# Patient Record
Sex: MALE | Race: ASIAN | Employment: OTHER | URBAN - METROPOLITAN AREA
[De-identification: names, ages, dates, MRNs, and addresses within clinical notes are randomized per-mention and may not be internally consistent; named-entity substitution may affect disease eponyms.]

---

## 2017-01-23 PROBLEM — G89.29 CHRONIC PAIN OF BOTH KNEES: Status: ACTIVE | Noted: 2017-01-23

## 2017-01-23 PROBLEM — M25.562 CHRONIC PAIN OF BOTH KNEES: Status: ACTIVE | Noted: 2017-01-23

## 2017-01-23 PROBLEM — M25.561 CHRONIC PAIN OF BOTH KNEES: Status: ACTIVE | Noted: 2017-01-23

## 2017-10-08 ENCOUNTER — HOSPITAL ENCOUNTER (EMERGENCY)
Facility: HOSPITAL | Age: 82
Discharge: HOME | End: 2017-10-08
Attending: EMERGENCY MEDICINE
Payer: COMMERCIAL

## 2017-10-08 ENCOUNTER — APPOINTMENT (OUTPATIENT)
Dept: GENERAL RADIOLOGY | Facility: HOSPITAL | Age: 82
End: 2017-10-08
Attending: EMERGENCY MEDICINE
Payer: COMMERCIAL

## 2017-10-08 VITALS
TEMPERATURE: 98 F | RESPIRATION RATE: 20 BRPM | WEIGHT: 156.94 LBS | SYSTOLIC BLOOD PRESSURE: 152 MMHG | BODY MASS INDEX: 27 KG/M2 | DIASTOLIC BLOOD PRESSURE: 98 MMHG | OXYGEN SATURATION: 98 % | HEART RATE: 68 BPM

## 2017-10-08 DIAGNOSIS — R09.89 FEELING OF FOREIGN BODY IN THROAT: Primary | ICD-10-CM

## 2017-10-08 PROCEDURE — 99283 EMERGENCY DEPT VISIT LOW MDM: CPT

## 2017-10-08 PROCEDURE — 70360 X-RAY EXAM OF NECK: CPT | Performed by: EMERGENCY MEDICINE

## 2017-10-09 NOTE — ED PROVIDER NOTES
Patient Seen in: [facility] Emergency Department    History   Patient presents with:  FB in Throat (GI, respiratory)    Stated Complaint: PILL STUCK IN THROAT    HPI    Patient is a pleasant 25-year-old male, presenting for evaluation after feeling src: Temporal  SpO2: 98 %  O2 Device: None (Room air)    Current:/98   Pulse 68   Temp 98.2 °F (36.8 °C) (Temporal)   Resp 20   Wt 71.2 kg   SpO2 98%   BMI 26.94 kg/m²     Physical Exam    Vital signs are reviewed noted as documented in the nursing c Patient was placed on the cart and evaluated. Soft tissue of the neck was obtained. Patient was given crackers and water. Patient was reevaluated. Results of the x-rays were reviewed. Patient states he feels subjectively improved.   Patient jane

## 2017-10-09 NOTE — ED INITIAL ASSESSMENT (HPI)
States he took half of tab of glucosamine an hr ago and feels like it became stuck in his throat. Able to swallow water. No resp distress.

## 2018-04-07 PROCEDURE — 82570 ASSAY OF URINE CREATININE: CPT | Performed by: INTERNAL MEDICINE

## 2018-04-07 PROCEDURE — 82043 UR ALBUMIN QUANTITATIVE: CPT | Performed by: INTERNAL MEDICINE

## 2018-06-04 PROBLEM — R01.1 CARDIAC MURMUR: Status: ACTIVE | Noted: 2018-06-04

## 2020-10-05 ENCOUNTER — HOSPITAL ENCOUNTER (EMERGENCY)
Facility: HOSPITAL | Age: 85
Discharge: HOME | End: 2020-10-05
Attending: EMERGENCY MEDICINE
Payer: COMMERCIAL

## 2020-10-05 ENCOUNTER — APPOINTMENT (OUTPATIENT)
Dept: GENERAL RADIOLOGY | Facility: HOSPITAL | Age: 85
End: 2020-10-05
Attending: EMERGENCY MEDICINE
Payer: COMMERCIAL

## 2020-10-05 VITALS
HEART RATE: 69 BPM | DIASTOLIC BLOOD PRESSURE: 81 MMHG | HEIGHT: 70 IN | WEIGHT: 159.81 LBS | RESPIRATION RATE: 21 BRPM | BODY MASS INDEX: 22.88 KG/M2 | TEMPERATURE: 98 F | SYSTOLIC BLOOD PRESSURE: 192 MMHG | OXYGEN SATURATION: 98 %

## 2020-10-05 DIAGNOSIS — H91.20 SUDDEN-ONSET SENSORINEURAL HEARING LOSS: Primary | ICD-10-CM

## 2020-10-05 DIAGNOSIS — R06.00 DYSPNEA ON EXERTION: ICD-10-CM

## 2020-10-05 PROCEDURE — 85379 FIBRIN DEGRADATION QUANT: CPT | Performed by: EMERGENCY MEDICINE

## 2020-10-05 PROCEDURE — 85025 COMPLETE CBC W/AUTO DIFF WBC: CPT | Performed by: EMERGENCY MEDICINE

## 2020-10-05 PROCEDURE — 99285 EMERGENCY DEPT VISIT HI MDM: CPT

## 2020-10-05 PROCEDURE — 80053 COMPREHEN METABOLIC PANEL: CPT | Performed by: EMERGENCY MEDICINE

## 2020-10-05 PROCEDURE — 84484 ASSAY OF TROPONIN QUANT: CPT | Performed by: EMERGENCY MEDICINE

## 2020-10-05 PROCEDURE — 99284 EMERGENCY DEPT VISIT MOD MDM: CPT

## 2020-10-05 PROCEDURE — 83880 ASSAY OF NATRIURETIC PEPTIDE: CPT | Performed by: EMERGENCY MEDICINE

## 2020-10-05 PROCEDURE — 36415 COLL VENOUS BLD VENIPUNCTURE: CPT

## 2020-10-05 PROCEDURE — 93005 ELECTROCARDIOGRAM TRACING: CPT

## 2020-10-05 PROCEDURE — 94640 AIRWAY INHALATION TREATMENT: CPT

## 2020-10-05 PROCEDURE — 71045 X-RAY EXAM CHEST 1 VIEW: CPT | Performed by: EMERGENCY MEDICINE

## 2020-10-05 PROCEDURE — 93010 ELECTROCARDIOGRAM REPORT: CPT

## 2020-10-05 RX ORDER — ALBUTEROL SULFATE 90 UG/1
8 AEROSOL, METERED RESPIRATORY (INHALATION) 4 TIMES DAILY
Status: DISCONTINUED | OUTPATIENT
Start: 2020-10-05 | End: 2020-10-05

## 2020-10-05 RX ORDER — PREDNISONE 20 MG/1
TABLET ORAL
Qty: 13 TABLET | Refills: 0 | Status: SHIPPED | OUTPATIENT
Start: 2020-10-05 | End: 2020-10-14

## 2020-10-05 RX ORDER — PREDNISONE 20 MG/1
40 TABLET ORAL ONCE
Status: COMPLETED | OUTPATIENT
Start: 2020-10-05 | End: 2020-10-05

## 2020-10-06 NOTE — ED INITIAL ASSESSMENT (HPI)
Per patients family patient had sudden hearing loss at 0900 this morning and has not improved throughout the day, however there is some positional dizziness. Patient also C/O feeling short of breath and some intermittent wheezes.   Patient is A&Ox4

## 2020-10-06 NOTE — ED PROVIDER NOTES
Patient Seen in: [facility] Emergency Department      History   Patient presents with:  Ear Problem Pain    Stated Complaint: reports hearing loss from right ear since noon.  denies injury or pain    HPI    80year-old with history of hypertension, h nonspecific finding. History reviewed. No pertinent surgical history.                  Social History    Tobacco Use      Smoking status: Never Smoker      Smokeless tobacco: Never Used    Alcohol use: No      Alcohol/week: 0.0 standard drinks following components:    Pro-Beta Natriuretic Peptide 902 (*)     All other components within normal limits   CBC W/ DIFFERENTIAL - Abnormal; Notable for the following components:    Monocyte Absolute 1.01 (*)     All other components within normal limits and his rapid is negative. proBNP just mildly elevated. He does have aortic stenosis and I brought up the possibility of this causing his symptoms are at least contributing. I offered admission for further evaluation. Family and patient declined.   They

## 2020-10-06 NOTE — RESPIRATORY THERAPY NOTE
Albuterol and atrovent inhaler given. Post BS clear/diminished. Pt states his breathing feels the same. No SOB at this time.

## 2021-03-31 ENCOUNTER — APPOINTMENT (OUTPATIENT)
Dept: GENERAL RADIOLOGY | Facility: HOSPITAL | Age: 86
End: 2021-03-31
Attending: EMERGENCY MEDICINE
Payer: COMMERCIAL

## 2021-03-31 ENCOUNTER — APPOINTMENT (OUTPATIENT)
Dept: CT IMAGING | Facility: HOSPITAL | Age: 86
End: 2021-03-31
Attending: EMERGENCY MEDICINE
Payer: COMMERCIAL

## 2021-03-31 ENCOUNTER — HOSPITAL ENCOUNTER (EMERGENCY)
Facility: HOSPITAL | Age: 86
Discharge: HOME | End: 2021-03-31
Attending: EMERGENCY MEDICINE
Payer: COMMERCIAL

## 2021-03-31 ENCOUNTER — APPOINTMENT (OUTPATIENT)
Dept: MRI IMAGING | Facility: HOSPITAL | Age: 86
End: 2021-03-31
Attending: EMERGENCY MEDICINE
Payer: COMMERCIAL

## 2021-03-31 VITALS
OXYGEN SATURATION: 97 % | SYSTOLIC BLOOD PRESSURE: 166 MMHG | BODY MASS INDEX: 23.95 KG/M2 | RESPIRATION RATE: 21 BRPM | TEMPERATURE: 98 F | HEART RATE: 88 BPM | WEIGHT: 149 LBS | DIASTOLIC BLOOD PRESSURE: 84 MMHG | HEIGHT: 66 IN

## 2021-03-31 DIAGNOSIS — R06.2 WHEEZING: Primary | ICD-10-CM

## 2021-03-31 DIAGNOSIS — R91.8 PULMONARY INFILTRATES ON CXR: ICD-10-CM

## 2021-03-31 DIAGNOSIS — I10 ESSENTIAL HYPERTENSION: ICD-10-CM

## 2021-03-31 DIAGNOSIS — R13.10 DIFFICULTY SWALLOWING SOLIDS: ICD-10-CM

## 2021-03-31 DIAGNOSIS — J90 PLEURAL EFFUSION: ICD-10-CM

## 2021-03-31 PROCEDURE — 70551 MRI BRAIN STEM W/O DYE: CPT | Performed by: EMERGENCY MEDICINE

## 2021-03-31 PROCEDURE — 96376 TX/PRO/DX INJ SAME DRUG ADON: CPT

## 2021-03-31 PROCEDURE — 70491 CT SOFT TISSUE NECK W/DYE: CPT | Performed by: EMERGENCY MEDICINE

## 2021-03-31 PROCEDURE — 99284 EMERGENCY DEPT VISIT MOD MDM: CPT

## 2021-03-31 PROCEDURE — 85025 COMPLETE CBC W/AUTO DIFF WBC: CPT | Performed by: EMERGENCY MEDICINE

## 2021-03-31 PROCEDURE — 96361 HYDRATE IV INFUSION ADD-ON: CPT

## 2021-03-31 PROCEDURE — 94640 AIRWAY INHALATION TREATMENT: CPT

## 2021-03-31 PROCEDURE — 99285 EMERGENCY DEPT VISIT HI MDM: CPT

## 2021-03-31 PROCEDURE — 96375 TX/PRO/DX INJ NEW DRUG ADDON: CPT

## 2021-03-31 PROCEDURE — 80053 COMPREHEN METABOLIC PANEL: CPT | Performed by: EMERGENCY MEDICINE

## 2021-03-31 PROCEDURE — 96374 THER/PROPH/DIAG INJ IV PUSH: CPT

## 2021-03-31 PROCEDURE — 71045 X-RAY EXAM CHEST 1 VIEW: CPT | Performed by: EMERGENCY MEDICINE

## 2021-03-31 RX ORDER — LORAZEPAM 2 MG/ML
1 INJECTION INTRAMUSCULAR ONCE
Status: COMPLETED | OUTPATIENT
Start: 2021-03-31 | End: 2021-03-31

## 2021-03-31 RX ORDER — ALBUTEROL SULFATE 90 UG/1
4 AEROSOL, METERED RESPIRATORY (INHALATION) ONCE
Status: COMPLETED | OUTPATIENT
Start: 2021-03-31 | End: 2021-03-31

## 2021-03-31 RX ORDER — IPRATROPIUM BROMIDE AND ALBUTEROL SULFATE 2.5; .5 MG/3ML; MG/3ML
3 SOLUTION RESPIRATORY (INHALATION) ONCE
Status: DISCONTINUED | OUTPATIENT
Start: 2021-03-31 | End: 2021-03-31

## 2021-03-31 RX ORDER — METHYLPREDNISOLONE 4 MG/1
TABLET ORAL
Qty: 1 PACKAGE | Refills: 0 | Status: SHIPPED | OUTPATIENT
Start: 2021-03-31 | End: 2021-05-13 | Stop reason: ALTCHOICE

## 2021-03-31 RX ORDER — ALBUTEROL SULFATE 2.5 MG/3ML
2.5 SOLUTION RESPIRATORY (INHALATION) EVERY 4 HOURS PRN
Qty: 30 AMPULE | Refills: 0 | Status: SHIPPED | OUTPATIENT
Start: 2021-03-31 | End: 2021-06-24

## 2021-03-31 RX ORDER — ALBUTEROL SULFATE 90 UG/1
2 AEROSOL, METERED RESPIRATORY (INHALATION) 4 TIMES DAILY
Status: DISCONTINUED | OUTPATIENT
Start: 2021-03-31 | End: 2021-03-31

## 2021-03-31 RX ORDER — FUROSEMIDE 10 MG/ML
40 INJECTION INTRAMUSCULAR; INTRAVENOUS ONCE
Status: COMPLETED | OUTPATIENT
Start: 2021-03-31 | End: 2021-03-31

## 2021-03-31 RX ORDER — METHYLPREDNISOLONE SODIUM SUCCINATE 125 MG/2ML
125 INJECTION, POWDER, LYOPHILIZED, FOR SOLUTION INTRAMUSCULAR; INTRAVENOUS ONCE
Status: COMPLETED | OUTPATIENT
Start: 2021-03-31 | End: 2021-03-31

## 2021-03-31 RX ORDER — AMOXICILLIN AND CLAVULANATE POTASSIUM 600; 42.9 MG/5ML; MG/5ML
875 POWDER, FOR SUSPENSION ORAL 2 TIMES DAILY
Qty: 100 ML | Refills: 0 | Status: SHIPPED | OUTPATIENT
Start: 2021-03-31 | End: 2021-04-10

## 2021-03-31 RX ORDER — HYDRALAZINE HYDROCHLORIDE 20 MG/ML
5 INJECTION INTRAMUSCULAR; INTRAVENOUS ONCE
Status: COMPLETED | OUTPATIENT
Start: 2021-03-31 | End: 2021-03-31

## 2021-03-31 NOTE — ED INITIAL ASSESSMENT (HPI)
Pt here stating he had a difficult time eating whole foods at lunch time feeling a fullness in his throat.  Pt was able to take his pills this morning with toast. Pt currently has audible wheezing denies sob per daughter in law pt has had audible wheezing f

## 2021-03-31 NOTE — ED PROVIDER NOTES
Patient Seen in: [facility] Emergency Department      History   Patient presents with:  Swallowing Problem    Stated Complaint: diff swallowing    HPI/Subjective:   HPI    Delightful 80year-old gentleman presents to the emergency department with hi on lung CT 5/1/2015    A 3.5 mm nodular opacity in the caudal and posterior lateral corner of the posterior segment of the right upper lobe (image 113 of series 3) represents a nonspecific finding. History reviewed.  No pertinent surgical histo present. Comments: While patient is wheezing in all lung fields he does not appear to even be significantly distressed by events. Oxygen saturations are 99 to 100%. Chest:      Chest wall: No tenderness.    Abdominal:      General: Bowel sounds are n -----------         ------                     CBC W/ DIFFERENTIAL[[id number]]          Abnormal            Final result                 Please view results for these tests on the individual orders.    RAINBOW DRAW LAVENDER   RAINBOW DRAW LIGHT GREEN   RAINB VASCULATURE:  Limited views are unremarkable. BONES:  No significant osseous lesions. Degenerative change involves the     cervical spine.       OTHER:  Survey images through the upper lungs demonstrate bilateral     pleural effusions, right greater t small vessel ischemic change within the deep white matter.               Dictated by (CST): Vera Holliday MD on 3/31/2021 at 10:03 PM         Finalized by (CST): Vera Holliday MD on 3/31/2021 at 10:06 PM        XR CHEST AP PORTABLE  (CPT=71045)   Final Result whitecoat hypertension therefore we tried some lorazepam as well particularly as we are doing some work-up.   Given that the patient has had the sudden onset of difficulty swallowing I do worry about lacunar infarcts particular with the recent history of th well to albuterol. He was given some steroids for the swelling and a Medrol Dosepak I think would be very reasonable.   Pleural effusions were seen on the CT soft tissue neck is unclear if they are simply too small to be seen on the chest x-ray that was do 45 Brook Johnson MD  [address]          Alice Nguyen MD  [id number] [address] [id number] [phone]    Schedule an appointment as soon as jerodl

## 2021-04-01 ENCOUNTER — HOSPITAL ENCOUNTER (OUTPATIENT)
Dept: GENERAL RADIOLOGY | Facility: HOSPITAL | Age: 86
Discharge: HOME | End: 2021-04-01
Attending: EMERGENCY MEDICINE
Payer: COMMERCIAL

## 2021-04-01 DIAGNOSIS — R13.10 DIFFICULTY SWALLOWING SOLIDS: ICD-10-CM

## 2021-04-01 PROCEDURE — 74230 X-RAY XM SWLNG FUNCJ C+: CPT | Performed by: EMERGENCY MEDICINE

## 2021-04-01 PROCEDURE — 92611 MOTION FLUOROSCOPY/SWALLOW: CPT

## 2021-04-01 NOTE — CM/SW NOTE
Scheduled video swallow XR at 1030 today. Called patient and his daughter in law, Shea Hamilton at [phone] to update that patient is scheduled for the test today. Patient should come in from the [employer]. Patient and daughter in law in agreement.  Centr

## 2021-04-01 NOTE — CM/SW NOTE
Patient will need follow up in terms of scheduling speech eval and video swallow. Patient's daughter in law Blanca Batch [phone] is at his bedside, she is a physician.    Explained that I can schedule evaluations, but may need assistance from PCP, Dr. Duke Marin

## 2021-04-01 NOTE — ED QUICK NOTES
Dr Yolie Garcia at bedside talking to pt and family at length about the POC.  Pt voided a total of 800cc yellow urine via urinal

## 2021-04-01 NOTE — SLP NOTE
ADULT VIDEOFLUOROSCOPIC SWALLOWING STUDY    Admission Date: 4/1/2021  Evaluation Date: 04/01/21  Radiologist: Dr. Dow Huddle: Regular (choose softer items)  Diet Recommendations - Liquid:  Thin    Further Upright;Midline;Standard Chair. Patient Viewed: Lateral.   .  Consistencies Presented: Thin liquids;Puree;Hard solid to assess oropharyngeal swallow function and assess for compensatory strategies to improve safe swallow function.     THIN LIQUIDS  Method Impairments of the oral phase resulted in increased oral transit times and oral residue which clears with liquid wash and multiple swallows.          PLAN:   No further skilled intervention is warranted as patient with a mild oral phase impairment and fun

## 2022-02-12 ENCOUNTER — LAB ENCOUNTER (OUTPATIENT)
Dept: LAB | Age: 87
End: 2022-02-12
Attending: INTERNAL MEDICINE
Payer: COMMERCIAL

## 2022-02-12 DIAGNOSIS — Z01.818 PRE-PROCEDURAL EXAMINATION: ICD-10-CM

## 2022-02-12 DIAGNOSIS — Z11.59 ENCOUNTER FOR SCREENING FOR OTHER VIRAL DISEASES: ICD-10-CM

## 2022-02-13 LAB — SARS-COV-2 RNA RESP QL NAA+PROBE: NOT DETECTED

## 2022-02-14 ENCOUNTER — LAB ENCOUNTER (OUTPATIENT)
Dept: LAB | Facility: HOSPITAL | Age: 87
End: 2022-02-14
Attending: INTERNAL MEDICINE
Payer: COMMERCIAL

## 2022-02-14 ENCOUNTER — HOSPITAL ENCOUNTER (OUTPATIENT)
Dept: ULTRASOUND IMAGING | Facility: HOSPITAL | Age: 87
Discharge: HOME | End: 2022-02-14
Attending: INTERNAL MEDICINE
Payer: COMMERCIAL

## 2022-02-14 ENCOUNTER — HOSPITAL ENCOUNTER (OUTPATIENT)
Dept: GENERAL RADIOLOGY | Facility: HOSPITAL | Age: 87
Discharge: HOME | End: 2022-02-14
Attending: INTERNAL MEDICINE
Payer: COMMERCIAL

## 2022-02-14 DIAGNOSIS — J90 RECURRENT RIGHT PLEURAL EFFUSION: ICD-10-CM

## 2022-02-14 DIAGNOSIS — R06.02 SOB (SHORTNESS OF BREATH): ICD-10-CM

## 2022-02-14 DIAGNOSIS — J90 PLEURAL EFFUSION ON RIGHT: ICD-10-CM

## 2022-02-14 DIAGNOSIS — E87.5 HYPERKALEMIA: ICD-10-CM

## 2022-02-14 LAB
ANION GAP SERPL CALC-SCNC: 2 MMOL/L (ref 0–18)
BASOPHILS NFR PLR: 0 %
BUN BLD-MCNC: 26 MG/DL (ref 7–18)
CALCIUM BLD-MCNC: 9 MG/DL (ref 8.5–10.1)
CHLORIDE SERPL-SCNC: 106 MMOL/L (ref 98–112)
CO2 SERPL-SCNC: 31 MMOL/L (ref 21–32)
COLOR FLD: YELLOW
CREAT BLD-MCNC: 1.65 MG/DL
EOSINOPHIL NFR PLR: 3 %
FASTING STATUS PATIENT QL REPORTED: NO
GLUCOSE BLD-MCNC: 136 MG/DL (ref 70–99)
GLUCOSE PLR-MCNC: 179 MG/DL
LDH FLD L TO P-CCNC: 96 U/L
LYMPHOCYTES NFR PLR: 43 %
MESOTHL CELL NFR PLR: 26 %
MONOS+MACROS NFR PLR: 20 %
NEUTROPHILS NFR PLR: 8 %
OSMOLALITY SERPL CALC.SUM OF ELEC: 295 MOSM/KG (ref 275–295)
PH PLR: 7.5 [PH] (ref 7.2–7.5)
POTASSIUM SERPL-SCNC: 4.4 MMOL/L (ref 3.5–5.1)
PROT PLR-MCNC: 3.3 G/DL
RBC PLEURAL FLUID: <3000 /MM3
SODIUM SERPL-SCNC: 139 MMOL/L (ref 136–145)
TOTAL CELLS COUNTED FLD: 100
TURBIDITY CSF QL: CLEAR
WBC # FLD: 114 /MM3

## 2022-02-14 PROCEDURE — 82800 BLOOD PH: CPT | Performed by: INTERNAL MEDICINE

## 2022-02-14 PROCEDURE — 80048 BASIC METABOLIC PNL TOTAL CA: CPT

## 2022-02-14 PROCEDURE — 89051 BODY FLUID CELL COUNT: CPT | Performed by: INTERNAL MEDICINE

## 2022-02-14 PROCEDURE — 32555 ASPIRATE PLEURA W/ IMAGING: CPT | Performed by: INTERNAL MEDICINE

## 2022-02-14 PROCEDURE — 88108 CYTOPATH CONCENTRATE TECH: CPT | Performed by: INTERNAL MEDICINE

## 2022-02-14 PROCEDURE — 82945 GLUCOSE OTHER FLUID: CPT | Performed by: INTERNAL MEDICINE

## 2022-02-14 PROCEDURE — 36415 COLL VENOUS BLD VENIPUNCTURE: CPT

## 2022-02-14 PROCEDURE — 87070 CULTURE OTHR SPECIMN AEROBIC: CPT | Performed by: INTERNAL MEDICINE

## 2022-02-14 PROCEDURE — 84157 ASSAY OF PROTEIN OTHER: CPT | Performed by: INTERNAL MEDICINE

## 2022-02-14 PROCEDURE — 89050 BODY FLUID CELL COUNT: CPT | Performed by: INTERNAL MEDICINE

## 2022-02-14 PROCEDURE — 88305 TISSUE EXAM BY PATHOLOGIST: CPT | Performed by: INTERNAL MEDICINE

## 2022-02-14 PROCEDURE — 83615 LACTATE (LD) (LDH) ENZYME: CPT | Performed by: INTERNAL MEDICINE

## 2022-02-14 PROCEDURE — 87205 SMEAR GRAM STAIN: CPT | Performed by: INTERNAL MEDICINE

## 2022-02-14 NOTE — PROCEDURES
Thoracentesis Procedure Note  : Dr. Georgie Pan  Indication: right thoracentesis   Location: RIGHT  Pre-procedure: Patient was identified in [address] [place] [place] [address]. Consent was obtained and documented from the patient. Procedural pause performed. Ultrasound was used to identify moderate pleural effusion with best window at the 10th intercostal space along the midscapular line. Vital signs, laboratory studies, chest imaging, medications, and allergies were reviewed. The patient was draped, and skin cleansed with chlorhexidine in the usual sterile fashion. Sterile gloves and masks were worn by operators. Procedure: 1% lidocaine was instilled first into the dermis and then adjacent to the pleural at the 10th intercostal space. A skin nick was made. A [employer] Safety Thoracentesis (8 Fr) catheter was placed in the usual sterile fashion. A total of 1.2L of clear yellow fluid was removed. The catheter was removed, and a sterile bandage was applied. The fluid was sent for pH, total protein, LDH, cultures, cytology . Post-procedure: The patient tolerated the procedure well. A post-procedural chest x-ray was ordered. ADDENDUM:  Post CXR noted with lateral hydropneumothorax, findings concerning for suspected trapped lung, less likely PTX. Recommend repeat CXR in am to ensure stability. Patient was advised to come immediately to the ER if he develops worsening SOB, chest pain, palpitations, lightheadedness/dizziness. Family at bedside, states madi Pan MD

## 2022-02-15 ENCOUNTER — HOSPITAL ENCOUNTER (OUTPATIENT)
Dept: GENERAL RADIOLOGY | Facility: HOSPITAL | Age: 87
Discharge: HOME | End: 2022-02-15
Attending: INTERNAL MEDICINE
Payer: COMMERCIAL

## 2022-02-15 DIAGNOSIS — J94.8 HYDROPNEUMOTHORAX: ICD-10-CM

## 2022-02-15 PROCEDURE — 71046 X-RAY EXAM CHEST 2 VIEWS: CPT | Performed by: INTERNAL MEDICINE

## 2022-02-17 ENCOUNTER — HOSPITAL ENCOUNTER (OUTPATIENT)
Dept: GENERAL RADIOLOGY | Facility: HOSPITAL | Age: 87
Discharge: HOME | End: 2022-02-17
Attending: INTERNAL MEDICINE
Payer: COMMERCIAL

## 2022-02-17 ENCOUNTER — HOSPITAL ENCOUNTER (OUTPATIENT)
Facility: HOSPITAL | Age: 87
Setting detail: OBSERVATION
Discharge: HOME | End: 2022-02-19
Attending: EMERGENCY MEDICINE | Admitting: INTERNAL MEDICINE
Payer: COMMERCIAL

## 2022-02-17 DIAGNOSIS — J94.8 HYDROPNEUMOTHORAX: ICD-10-CM

## 2022-02-17 DIAGNOSIS — J94.2 PNEUMOHEMOTHORAX: Primary | ICD-10-CM

## 2022-02-17 PROBLEM — J93.9 PNEUMOTHORAX: Status: ACTIVE | Noted: 2022-02-17

## 2022-02-17 LAB
ALBUMIN SERPL-MCNC: 3.2 G/DL (ref 3.4–5)
ALBUMIN/GLOB SERPL: 0.7 {RATIO} (ref 1–2)
ALP LIVER SERPL-CCNC: 119 U/L
ALT SERPL-CCNC: 24 U/L
ANION GAP SERPL CALC-SCNC: 3 MMOL/L (ref 0–18)
AST SERPL-CCNC: 17 U/L (ref 15–37)
BASOPHILS # BLD AUTO: 0.09 X10(3) UL (ref 0–0.2)
BASOPHILS NFR BLD AUTO: 1 %
BILIRUB SERPL-MCNC: 0.5 MG/DL (ref 0.1–2)
BUN BLD-MCNC: 24 MG/DL (ref 7–18)
CALCIUM BLD-MCNC: 9.4 MG/DL (ref 8.5–10.1)
CHLORIDE SERPL-SCNC: 105 MMOL/L (ref 98–112)
CO2 SERPL-SCNC: 30 MMOL/L (ref 21–32)
CREAT BLD-MCNC: 1.6 MG/DL
EOSINOPHIL # BLD AUTO: 0.56 X10(3) UL (ref 0–0.7)
EOSINOPHIL NFR BLD AUTO: 6.2 %
ERYTHROCYTE [DISTWIDTH] IN BLOOD BY AUTOMATED COUNT: 13.7 %
GLOBULIN PLAS-MCNC: 4.3 G/DL (ref 2.8–4.4)
GLUCOSE BLD-MCNC: 131 MG/DL (ref 70–99)
HCT VFR BLD AUTO: 39.2 %
HGB BLD-MCNC: 12.6 G/DL
IMM GRANULOCYTES # BLD AUTO: 0.02 X10(3) UL (ref 0–1)
IMM GRANULOCYTES NFR BLD: 0.2 %
LYMPHOCYTES # BLD AUTO: 1.51 X10(3) UL (ref 1–4)
LYMPHOCYTES NFR BLD AUTO: 16.6 %
MCH RBC QN AUTO: 27.3 PG (ref 26–34)
MCHC RBC AUTO-ENTMCNC: 32.1 G/DL (ref 31–37)
MCV RBC AUTO: 85 FL
MONOCYTES # BLD AUTO: 1.17 X10(3) UL (ref 0.1–1)
MONOCYTES NFR BLD AUTO: 12.9 %
NEUTROPHILS # BLD AUTO: 5.72 X10 (3) UL (ref 1.5–7.7)
NEUTROPHILS # BLD AUTO: 5.72 X10(3) UL (ref 1.5–7.7)
NEUTROPHILS NFR BLD AUTO: 63.1 %
OSMOLALITY SERPL CALC.SUM OF ELEC: 292 MOSM/KG (ref 275–295)
POTASSIUM SERPL-SCNC: 4.4 MMOL/L (ref 3.5–5.1)
RBC # BLD AUTO: 4.61 X10(6)UL
SARS-COV-2 RNA RESP QL NAA+PROBE: NOT DETECTED
SODIUM SERPL-SCNC: 138 MMOL/L (ref 136–145)
WBC # BLD AUTO: 9.1 X10(3) UL (ref 4–11)

## 2022-02-17 PROCEDURE — 99285 EMERGENCY DEPT VISIT HI MDM: CPT

## 2022-02-17 PROCEDURE — 93005 ELECTROCARDIOGRAM TRACING: CPT

## 2022-02-17 PROCEDURE — 85025 COMPLETE CBC W/AUTO DIFF WBC: CPT | Performed by: EMERGENCY MEDICINE

## 2022-02-17 PROCEDURE — 36415 COLL VENOUS BLD VENIPUNCTURE: CPT

## 2022-02-17 PROCEDURE — 80053 COMPREHEN METABOLIC PANEL: CPT | Performed by: EMERGENCY MEDICINE

## 2022-02-17 PROCEDURE — 71046 X-RAY EXAM CHEST 2 VIEWS: CPT | Performed by: INTERNAL MEDICINE

## 2022-02-17 PROCEDURE — 93010 ELECTROCARDIOGRAM REPORT: CPT

## 2022-02-17 RX ORDER — ACETAMINOPHEN 500 MG
500 TABLET ORAL EVERY 6 HOURS PRN
COMMUNITY

## 2022-02-17 RX ORDER — ACETAMINOPHEN 325 MG/1
650 TABLET ORAL EVERY 6 HOURS PRN
Status: DISCONTINUED | OUTPATIENT
Start: 2022-02-17 | End: 2022-02-19

## 2022-02-18 ENCOUNTER — APPOINTMENT (OUTPATIENT)
Dept: GENERAL RADIOLOGY | Facility: HOSPITAL | Age: 87
End: 2022-02-18
Attending: INTERNAL MEDICINE
Payer: COMMERCIAL

## 2022-02-18 LAB
ADENOVIRUS PCR:: NOT DETECTED
ANION GAP SERPL CALC-SCNC: 1 MMOL/L (ref 0–18)
ATRIAL RATE: 57 BPM
ATRIAL RATE: 80 BPM
B PARAPERT DNA SPEC QL NAA+PROBE: NOT DETECTED
B PERT DNA SPEC QL NAA+PROBE: NOT DETECTED
BASOPHILS # BLD AUTO: 0.07 X10(3) UL (ref 0–0.2)
BASOPHILS NFR BLD AUTO: 0.8 %
BUN BLD-MCNC: 25 MG/DL (ref 7–18)
C PNEUM DNA SPEC QL NAA+PROBE: NOT DETECTED
CALCIUM BLD-MCNC: 8.9 MG/DL (ref 8.5–10.1)
CHLORIDE SERPL-SCNC: 105 MMOL/L (ref 98–112)
CO2 SERPL-SCNC: 33 MMOL/L (ref 21–32)
CORONAVIRUS 229E PCR:: NOT DETECTED
CORONAVIRUS HKU1 PCR:: NOT DETECTED
CORONAVIRUS NL63 PCR:: NOT DETECTED
CORONAVIRUS OC43 PCR:: NOT DETECTED
CREAT BLD-MCNC: 1.54 MG/DL
EOSINOPHIL # BLD AUTO: 0.72 X10(3) UL (ref 0–0.7)
ERYTHROCYTE [DISTWIDTH] IN BLOOD BY AUTOMATED COUNT: 13.7 %
FLUAV RNA SPEC QL NAA+PROBE: NOT DETECTED
FLUBV RNA SPEC QL NAA+PROBE: NOT DETECTED
GLUCOSE BLD-MCNC: 119 MG/DL (ref 70–99)
HCT VFR BLD AUTO: 38.7 %
HGB BLD-MCNC: 11.8 G/DL
IMM GRANULOCYTES # BLD AUTO: 0.03 X10(3) UL (ref 0–1)
IMM GRANULOCYTES NFR BLD: 0.4 %
LYMPHOCYTES # BLD AUTO: 1.42 X10(3) UL (ref 1–4)
LYMPHOCYTES NFR BLD AUTO: 16.6 %
MCH RBC QN AUTO: 26.4 PG (ref 26–34)
MCHC RBC AUTO-ENTMCNC: 30.5 G/DL (ref 31–37)
MCV RBC AUTO: 86.6 FL
METAPNEUMOVIRUS PCR:: NOT DETECTED
MONOCYTES # BLD AUTO: 1.04 X10(3) UL (ref 0.1–1)
MONOCYTES NFR BLD AUTO: 12.2 %
MYCOPLASMA PNEUMONIA PCR:: NOT DETECTED
NEUTROPHILS # BLD AUTO: 5.26 X10 (3) UL (ref 1.5–7.7)
NEUTROPHILS # BLD AUTO: 5.26 X10(3) UL (ref 1.5–7.7)
NEUTROPHILS NFR BLD AUTO: 61.6 %
OSMOLALITY SERPL CALC.SUM OF ELEC: 294 MOSM/KG (ref 275–295)
PARAINFLUENZA 1 PCR:: NOT DETECTED
PARAINFLUENZA 2 PCR:: NOT DETECTED
PARAINFLUENZA 3 PCR:: NOT DETECTED
PARAINFLUENZA 4 PCR:: NOT DETECTED
PLATELET # BLD AUTO: 308 10(3)UL (ref 150–450)
POTASSIUM SERPL-SCNC: 4.8 MMOL/L (ref 3.5–5.1)
Q-T INTERVAL: 412 MS
Q-T INTERVAL: 422 MS
QRS DURATION: 78 MS
QRS DURATION: 82 MS
QTC CALCULATION (BEZET): 414 MS
QTC CALCULATION (BEZET): 414 MS
R AXIS: -41 DEGREES
R AXIS: -44 DEGREES
RBC # BLD AUTO: 4.47 X10(6)UL
RHINOVIRUS/ENTERO PCR:: NOT DETECTED
RSV RNA SPEC QL NAA+PROBE: NOT DETECTED
SARS-COV-2 RNA NPH QL NAA+NON-PROBE: NOT DETECTED
SODIUM SERPL-SCNC: 139 MMOL/L (ref 136–145)
T AXIS: 16 DEGREES
T AXIS: 27 DEGREES
VENTRICULAR RATE: 58 BPM
VENTRICULAR RATE: 61 BPM
WBC # BLD AUTO: 8.5 X10(3) UL (ref 4–11)

## 2022-02-18 PROCEDURE — 93005 ELECTROCARDIOGRAM TRACING: CPT

## 2022-02-18 PROCEDURE — 71045 X-RAY EXAM CHEST 1 VIEW: CPT | Performed by: INTERNAL MEDICINE

## 2022-02-18 PROCEDURE — 80048 BASIC METABOLIC PNL TOTAL CA: CPT | Performed by: INTERNAL MEDICINE

## 2022-02-18 PROCEDURE — 93010 ELECTROCARDIOGRAM REPORT: CPT | Performed by: INTERNAL MEDICINE

## 2022-02-18 PROCEDURE — 85025 COMPLETE CBC W/AUTO DIFF WBC: CPT | Performed by: INTERNAL MEDICINE

## 2022-02-18 PROCEDURE — 94640 AIRWAY INHALATION TREATMENT: CPT

## 2022-02-18 PROCEDURE — 0202U NFCT DS 22 TRGT SARS-COV-2: CPT | Performed by: HOSPITALIST

## 2022-02-18 RX ORDER — ATORVASTATIN CALCIUM 10 MG/1
10 TABLET, FILM COATED ORAL NIGHTLY
Status: DISCONTINUED | OUTPATIENT
Start: 2022-02-18 | End: 2022-02-19

## 2022-02-18 RX ORDER — FLUTICASONE PROPIONATE 50 MCG
2 SPRAY, SUSPENSION (ML) NASAL DAILY
Status: DISCONTINUED | OUTPATIENT
Start: 2022-02-18 | End: 2022-02-19

## 2022-02-18 RX ORDER — LOSARTAN POTASSIUM 100 MG/1
100 TABLET ORAL DAILY
Status: DISCONTINUED | OUTPATIENT
Start: 2022-02-19 | End: 2022-02-19

## 2022-02-18 RX ORDER — LEVOTHYROXINE SODIUM 0.05 MG/1
50 TABLET ORAL
Status: DISCONTINUED | OUTPATIENT
Start: 2022-02-18 | End: 2022-02-19

## 2022-02-18 RX ORDER — METOPROLOL TARTRATE 50 MG/1
50 TABLET, FILM COATED ORAL
Status: DISCONTINUED | OUTPATIENT
Start: 2022-02-18 | End: 2022-02-18

## 2022-02-18 RX ORDER — FUROSEMIDE 40 MG/1
40 TABLET ORAL
Status: DISCONTINUED | OUTPATIENT
Start: 2022-02-18 | End: 2022-02-19

## 2022-02-18 RX ORDER — AZELASTINE 1 MG/ML
1 SPRAY, METERED NASAL 2 TIMES DAILY PRN
Status: DISCONTINUED | OUTPATIENT
Start: 2022-02-18 | End: 2022-02-19

## 2022-02-18 RX ORDER — ALBUTEROL SULFATE 2.5 MG/3ML
2.5 SOLUTION RESPIRATORY (INHALATION) EVERY 4 HOURS PRN
Status: DISCONTINUED | OUTPATIENT
Start: 2022-02-18 | End: 2022-02-19

## 2022-02-18 RX ORDER — METOPROLOL TARTRATE 50 MG/1
50 TABLET, FILM COATED ORAL
Status: DISCONTINUED | OUTPATIENT
Start: 2022-02-18 | End: 2022-02-19

## 2022-02-18 NOTE — ED INITIAL ASSESSMENT (HPI)
Pt arrives via wheelchair to room, had Right sided pleurocentesis on Monday where 1.2Liters were drained. Pt had noted small pneumothorax after surgery, per pulmonology they suggested to monitor and get xrays. Today they had another xray, pulmonology recommended ED visit as the hydropneumothorax has been slightly increasing. A&O x3.

## 2022-02-18 NOTE — PROGRESS NOTES
02/17/22 2305 02/17/22 2310 02/17/22 2315   Vital Signs   BP (!) 176/51 (!) 172/51 (!) 173/56   MAP (mmHg) 81 76 84   BP Location Left arm Left arm Left arm   BP Method Automatic Automatic Automatic   Patient Position Lying Sitting Standing

## 2022-02-18 NOTE — PROGRESS NOTES
Pt is A+Ox4. VSS on RA- but using 4L of O2 for treatment of PTX at this time. R lung sounds diminished. L lung sounds clear. Pt c.o wheezing- PRN nebs ordered- to be given one PCR done. Currently in Junctional rhythm- seems to be new since patients last EKG- Cardiology consulted. Voids in bathroom. No c.o pain or discomfort at this time. Up with SBA and walker. Son at bedside to assist with translation if needed. WCTM patient at this time.

## 2022-02-18 NOTE — ED QUICK NOTES
Orders for admission, patient is aware of plan and ready to go upstairs. Any questions, please call ED RN Sarah Craig at extension [phone]    Vaccinated? Yes  Type of COVID test sent: Rapid  COVID Suspicion level: Low      Titratable drug(s) infusing:   Rate: N/A    LOC at time of transport: A&O x3    Other pertinent information: Pt and family do not want chest tube at this time and are preferring oygen therapy and monitor. Pt's daughter is intensivist and patient advocate. Pt is on 6L NC, he is 100% on RA but is on NC for the pneumo.     CIWA score=  NIH score=

## 2022-02-18 NOTE — PLAN OF CARE
NURSING ADMISSION NOTE      Patient admitted via cart from ER to Rm 2627  AOX4, very limited [place]. On O2 6L/NC sats 100%  Accompanied by daughter in law  Up in the bathroom with walker  Denies pain  Informed Pulm of admission. Maintain O2 sats 92% and above, and can decrease O2. O2 decreased to 4l/NC and order CXR in am  SBP 170s, pt didn't take his BP med, given. Labs in am.  Oriented to room. Safety precautions initiated. Bed in low position. Call light in reach.   Problem: Patient/Family Goals  Goal: Patient/Family Long Term Goal  Description: Patient's Long Term Goal:Resolved Pneumo/hydrothorax    Interventions:  - O2  -pulm consult  -CXR  -monitor O2 sats  - See additional Care Plan goals for specific interventions  Outcome: Progressing  Goal: Patient/Family Short Term Goal  Description: Patient's Short Term Goal: be comfortable, able to rest/sleep    Interventions:   - needs attended  -O2  -fall prevention  -cluster care  - See additional Care Plan goals for specific interventions  Outcome: Progressing     Problem: DISCHARGE PLANNING  Goal: Discharge to home or other facility with appropriate resources  Description: INTERVENTIONS:  - Identify barriers to discharge w/pt and caregiver  - Include patient/family/discharge partner in discharge planning  - Arrange for needed discharge resources and transportation as appropriate  - Identify discharge learning needs (meds, wound care, etc)  - Arrange for interpreters to assist at discharge as needed  - Consider post-discharge preferences of patient/family/discharge partner  - Complete POLST form as appropriate  - Assess patient's ability to be responsible for managing their own health  - Refer to Case Management Department for coordinating discharge planning if the patient needs post-hospital services based on physician/LIP order or complex needs related to functional status, cognitive ability or social support system  Outcome: Progressing     Problem: CARDIOVASCULAR - ADULT  Goal: Maintains optimal cardiac output and hemodynamic stability  Description: INTERVENTIONS:  - Monitor vital signs, rhythm, and trends  - Monitor for bleeding, hypotension and signs of decreased cardiac output  - Evaluate effectiveness of vasoactive medications to optimize hemodynamic stability  - Monitor arterial and/or venous puncture sites for bleeding and/or hematoma  - Assess quality of pulses, skin color and temperature  - Assess for signs of decreased coronary artery perfusion - ex.  Angina  - Evaluate fluid balance, assess for edema, trend weights  Outcome: Progressing  Goal: Absence of cardiac arrhythmias or at baseline  Description: INTERVENTIONS:  - Continuous cardiac monitoring, monitor vital signs, obtain 12 lead EKG if indicated  - Evaluate effectiveness of antiarrhythmic and heart rate control medications as ordered  - Initiate emergency measures for life threatening arrhythmias  - Monitor electrolytes and administer replacement therapy as ordered  Outcome: Progressing     Problem: RESPIRATORY - ADULT  Goal: Achieves optimal ventilation and oxygenation  Description: INTERVENTIONS:  - Assess for changes in respiratory status  - Assess for changes in mentation and behavior  - Position to facilitate oxygenation and minimize respiratory effort  - Oxygen supplementation based on oxygen saturation or ABGs  - Provide Smoking Cessation handout, if applicable  - Encourage broncho-pulmonary hygiene including cough, deep breathe, Incentive Spirometry  - Assess the need for suctioning and perform as needed  - Assess and instruct to report SOB or any respiratory difficulty  - Respiratory Therapy support as indicated  - Manage/alleviate anxiety  - Monitor for signs/symptoms of CO2 retention  Outcome: Progressing

## 2022-02-19 ENCOUNTER — APPOINTMENT (OUTPATIENT)
Dept: GENERAL RADIOLOGY | Facility: HOSPITAL | Age: 87
End: 2022-02-19
Attending: INTERNAL MEDICINE
Payer: COMMERCIAL

## 2022-02-19 VITALS
HEART RATE: 60 BPM | OXYGEN SATURATION: 98 % | DIASTOLIC BLOOD PRESSURE: 51 MMHG | TEMPERATURE: 98 F | RESPIRATION RATE: 16 BRPM | WEIGHT: 136.88 LBS | BODY MASS INDEX: 23 KG/M2 | SYSTOLIC BLOOD PRESSURE: 154 MMHG

## 2022-02-19 LAB
ANION GAP SERPL CALC-SCNC: 2 MMOL/L (ref 0–18)
BUN BLD-MCNC: 25 MG/DL (ref 7–18)
CALCIUM BLD-MCNC: 9 MG/DL (ref 8.5–10.1)
CHLORIDE SERPL-SCNC: 104 MMOL/L (ref 98–112)
CO2 SERPL-SCNC: 34 MMOL/L (ref 21–32)
CREAT BLD-MCNC: 1.45 MG/DL
ERYTHROCYTE [DISTWIDTH] IN BLOOD BY AUTOMATED COUNT: 13.3 %
GLUCOSE BLD-MCNC: 126 MG/DL (ref 70–99)
HCT VFR BLD AUTO: 37.8 %
HGB BLD-MCNC: 11.7 G/DL
MAGNESIUM SERPL-MCNC: 2.1 MG/DL (ref 1.6–2.6)
MCH RBC QN AUTO: 26.5 PG (ref 26–34)
MCHC RBC AUTO-ENTMCNC: 31 G/DL (ref 31–37)
MCV RBC AUTO: 85.7 FL
OSMOLALITY SERPL CALC.SUM OF ELEC: 296 MOSM/KG (ref 275–295)
PLATELET # BLD AUTO: 316 10(3)UL (ref 150–450)
POTASSIUM SERPL-SCNC: 4.4 MMOL/L (ref 3.5–5.1)
RBC # BLD AUTO: 4.41 X10(6)UL
SODIUM SERPL-SCNC: 140 MMOL/L (ref 136–145)
WBC # BLD AUTO: 8 X10(3) UL (ref 4–11)

## 2022-02-19 PROCEDURE — 71045 X-RAY EXAM CHEST 1 VIEW: CPT | Performed by: INTERNAL MEDICINE

## 2022-02-19 PROCEDURE — 85027 COMPLETE CBC AUTOMATED: CPT | Performed by: HOSPITALIST

## 2022-02-19 PROCEDURE — 80048 BASIC METABOLIC PNL TOTAL CA: CPT | Performed by: HOSPITALIST

## 2022-02-19 PROCEDURE — 83735 ASSAY OF MAGNESIUM: CPT | Performed by: HOSPITALIST

## 2022-02-19 NOTE — PLAN OF CARE
Assumed care of patient at [address]. Alert and oriented x4, No C/O chest pain or SOB, SPO2 on 4L 100%, Heart rate Junctional/SB 50s-60s. Breath sounds in R lung sound diminished, L lung sounds clear. Bed is locked and in low position. Personal belonging within reach. Daughter at bedside. Updated on plan of care and verbalized understanding. Will continue to monitor.      Problem: Patient/Family Goals  Goal: Patient/Family Long Term Goal  Description: Patient's Long Term Goal: resolution of PTX    Interventions:  - See additional Care Plan goals for specific interventions  Outcome: Progressing  Goal: Patient/Family Short Term Goal  Description: Patient's Short Term Goal: PTX improvement    Interventions:   - O2 therapy  -Pulm consult    - See additional Care Plan goals for specific interventions  Outcome: Progressing     Problem: DISCHARGE PLANNING  Goal: Discharge to home or other facility with appropriate resources  Description: INTERVENTIONS:  - Identify barriers to discharge w/pt and caregiver  - Include patient/family/discharge partner in discharge planning  - Arrange for needed discharge resources and transportation as appropriate  - Identify discharge learning needs (meds, wound care, etc)  - Arrange for interpreters to assist at discharge as needed  - Consider post-discharge preferences of patient/family/discharge partner  - Complete POLST form as appropriate  - Assess patient's ability to be responsible for managing their own health  - Refer to Case Management Department for coordinating discharge planning if the patient needs post-hospital services based on physician/LIP order or complex needs related to functional status, cognitive ability or social support system  Outcome: Progressing     Problem: CARDIOVASCULAR - ADULT  Goal: Maintains optimal cardiac output and hemodynamic stability  Description: INTERVENTIONS:  - Monitor vital signs, rhythm, and trends  - Monitor for bleeding, hypotension and signs of decreased cardiac output  - Evaluate effectiveness of vasoactive medications to optimize hemodynamic stability  - Monitor arterial and/or venous puncture sites for bleeding and/or hematoma  - Assess quality of pulses, skin color and temperature  - Assess for signs of decreased coronary artery perfusion - ex.  Angina  - Evaluate fluid balance, assess for edema, trend weights  Outcome: Progressing  Goal: Absence of cardiac arrhythmias or at baseline  Description: INTERVENTIONS:  - Continuous cardiac monitoring, monitor vital signs, obtain 12 lead EKG if indicated  - Evaluate effectiveness of antiarrhythmic and heart rate control medications as ordered  - Initiate emergency measures for life threatening arrhythmias  - Monitor electrolytes and administer replacement therapy as ordered  Outcome: Progressing     Problem: RESPIRATORY - ADULT  Goal: Achieves optimal ventilation and oxygenation  Description: INTERVENTIONS:  - Assess for changes in respiratory status  - Assess for changes in mentation and behavior  - Position to facilitate oxygenation and minimize respiratory effort  - Oxygen supplementation based on oxygen saturation or ABGs  - Provide Smoking Cessation handout, if applicable  - Encourage broncho-pulmonary hygiene including cough, deep breathe, Incentive Spirometry  - Assess the need for suctioning and perform as needed  - Assess and instruct to report SOB or any respiratory difficulty  - Respiratory Therapy support as indicated  - Manage/alleviate anxiety  - Monitor for signs/symptoms of CO2 retention  Outcome: Progressing

## 2022-02-19 NOTE — PLAN OF CARE
Pt Aox4, saturating well on RA. Okay for dc per pulm; f/u cxray on Monday. Family stated they will call first thing Monady morning.      Problem: Patient/Family Goals  Goal: Patient/Family Long Term Goal  Description: Patient's Long Term Goal: resolution of PTX    Interventions:  - See additional Care Plan goals for specific interventions  Outcome: Adequate for Discharge  Goal: Patient/Family Short Term Goal  Description: Patient's Short Term Goal: PTX improvement    Interventions:   - O2 therapy  -Pulm consult    - See additional Care Plan goals for specific interventions  Outcome: Adequate for Discharge     Problem: DISCHARGE PLANNING  Goal: Discharge to home or other facility with appropriate resources  Description: INTERVENTIONS:  - Identify barriers to discharge w/pt and caregiver  - Include patient/family/discharge partner in discharge planning  - Arrange for needed discharge resources and transportation as appropriate  - Identify discharge learning needs (meds, wound care, etc)  - Arrange for interpreters to assist at discharge as needed  - Consider post-discharge preferences of patient/family/discharge partner  - Complete POLST form as appropriate  - Assess patient's ability to be responsible for managing their own health  - Refer to Case Management Department for coordinating discharge planning if the patient needs post-hospital services based on physician/LIP order or complex needs related to functional status, cognitive ability or social support system  Outcome: Adequate for Discharge     Problem: CARDIOVASCULAR - ADULT  Goal: Maintains optimal cardiac output and hemodynamic stability  Description: INTERVENTIONS:  - Monitor vital signs, rhythm, and trends  - Monitor for bleeding, hypotension and signs of decreased cardiac output  - Evaluate effectiveness of vasoactive medications to optimize hemodynamic stability  - Monitor arterial and/or venous puncture sites for bleeding and/or hematoma  - Assess quality of pulses, skin color and temperature  - Assess for signs of decreased coronary artery perfusion - ex.  Angina  - Evaluate fluid balance, assess for edema, trend weights  Outcome: Adequate for Discharge  Goal: Absence of cardiac arrhythmias or at baseline  Description: INTERVENTIONS:  - Continuous cardiac monitoring, monitor vital signs, obtain 12 lead EKG if indicated  - Evaluate effectiveness of antiarrhythmic and heart rate control medications as ordered  - Initiate emergency measures for life threatening arrhythmias  - Monitor electrolytes and administer replacement therapy as ordered  Outcome: Adequate for Discharge     Problem: RESPIRATORY - ADULT  Goal: Achieves optimal ventilation and oxygenation  Description: INTERVENTIONS:  - Assess for changes in respiratory status  - Assess for changes in mentation and behavior  - Position to facilitate oxygenation and minimize respiratory effort  - Oxygen supplementation based on oxygen saturation or ABGs  - Provide Smoking Cessation handout, if applicable  - Encourage broncho-pulmonary hygiene including cough, deep breathe, Incentive Spirometry  - Assess the need for suctioning and perform as needed  - Assess and instruct to report SOB or any respiratory difficulty  - Respiratory Therapy support as indicated  - Manage/alleviate anxiety  - Monitor for signs/symptoms of CO2 retention  Outcome: Adequate for Discharge

## 2022-02-19 NOTE — PROGRESS NOTES
Full Note to Follow    Pt seen and examined, stable for dc from IM standpoint    Thank Casimiro Sever, [address] [facility]  Internal Medicine  Answering Service number: [phone]

## 2022-02-21 ENCOUNTER — APPOINTMENT (OUTPATIENT)
Dept: CT IMAGING | Facility: HOSPITAL | Age: 87
DRG: 061 | End: 2022-02-21
Attending: EMERGENCY MEDICINE
Payer: COMMERCIAL

## 2022-02-21 ENCOUNTER — APPOINTMENT (OUTPATIENT)
Dept: MRI IMAGING | Facility: HOSPITAL | Age: 87
DRG: 061 | End: 2022-02-21
Attending: EMERGENCY MEDICINE
Payer: COMMERCIAL

## 2022-02-21 ENCOUNTER — HOSPITAL ENCOUNTER (OUTPATIENT)
Dept: GENERAL RADIOLOGY | Facility: HOSPITAL | Age: 87
Discharge: HOME | End: 2022-02-21
Attending: INTERNAL MEDICINE
Payer: COMMERCIAL

## 2022-02-21 ENCOUNTER — HOSPITAL ENCOUNTER (INPATIENT)
Facility: HOSPITAL | Age: 87
LOS: 5 days | Discharge: NURSING, REHAB OR LONG-TERM CARE | DRG: 061 | End: 2022-02-26
Attending: EMERGENCY MEDICINE | Admitting: HOSPITALIST
Payer: COMMERCIAL

## 2022-02-21 DIAGNOSIS — R53.1 LEFT-SIDED WEAKNESS: Primary | ICD-10-CM

## 2022-02-21 DIAGNOSIS — J95.811 POSTPROCEDURAL PNEUMOTHORAX: ICD-10-CM

## 2022-02-21 PROBLEM — E87.1 HYPONATREMIA: Status: ACTIVE | Noted: 2022-02-21

## 2022-02-21 PROBLEM — R73.9 HYPERGLYCEMIA: Status: ACTIVE | Noted: 2022-02-21

## 2022-02-21 LAB
ALBUMIN SERPL-MCNC: 3.1 G/DL (ref 3.4–5)
ALBUMIN/GLOB SERPL: 0.7 {RATIO} (ref 1–2)
ALP LIVER SERPL-CCNC: 115 U/L
ALT SERPL-CCNC: 21 U/L
ANION GAP SERPL CALC-SCNC: 5 MMOL/L (ref 0–18)
AST SERPL-CCNC: 19 U/L (ref 15–37)
ATRIAL RATE: 67 BPM
BASOPHILS # BLD AUTO: 0.09 X10(3) UL (ref 0–0.2)
BASOPHILS NFR BLD AUTO: 1 %
BILIRUB SERPL-MCNC: 0.6 MG/DL (ref 0.1–2)
CALCIUM BLD-MCNC: 9 MG/DL (ref 8.5–10.1)
CHLORIDE SERPL-SCNC: 100 MMOL/L (ref 98–112)
CO2 SERPL-SCNC: 30 MMOL/L (ref 21–32)
CREAT BLD-MCNC: 1.74 MG/DL
EOSINOPHIL # BLD AUTO: 0.56 X10(3) UL (ref 0–0.7)
EOSINOPHIL NFR BLD AUTO: 6.5 %
ERYTHROCYTE [DISTWIDTH] IN BLOOD BY AUTOMATED COUNT: 13.4 %
GLOBULIN PLAS-MCNC: 4.7 G/DL (ref 2.8–4.4)
GLUCOSE BLD-MCNC: 135 MG/DL (ref 70–99)
GLUCOSE BLD-MCNC: 192 MG/DL (ref 70–99)
GLUCOSE BLD-MCNC: 236 MG/DL (ref 70–99)
HCT VFR BLD AUTO: 39.3 %
HGB BLD-MCNC: 12.7 G/DL
IMM GRANULOCYTES # BLD AUTO: 0.02 X10(3) UL (ref 0–1)
IMM GRANULOCYTES NFR BLD: 0.2 %
LYMPHOCYTES # BLD AUTO: 1.76 X10(3) UL (ref 1–4)
LYMPHOCYTES NFR BLD AUTO: 20.5 %
MCH RBC QN AUTO: 26.6 PG (ref 26–34)
MCHC RBC AUTO-ENTMCNC: 32.3 G/DL (ref 31–37)
MCV RBC AUTO: 82.2 FL
MONOCYTES # BLD AUTO: 0.85 X10(3) UL (ref 0.1–1)
MONOCYTES NFR BLD AUTO: 9.9 %
NEUTROPHILS # BLD AUTO: 5.32 X10 (3) UL (ref 1.5–7.7)
NEUTROPHILS # BLD AUTO: 5.32 X10(3) UL (ref 1.5–7.7)
NEUTROPHILS NFR BLD AUTO: 61.9 %
NT-PROBNP SERPL-MCNC: 1131 PG/ML (ref ?–450)
OSMOLALITY SERPL CALC.SUM OF ELEC: 289 MOSM/KG (ref 275–295)
PLATELET # BLD AUTO: 346 10(3)UL (ref 150–450)
POTASSIUM SERPL-SCNC: 3.9 MMOL/L (ref 3.5–5.1)
PROT SERPL-MCNC: 7.8 G/DL (ref 6.4–8.2)
Q-T INTERVAL: 420 MS
QRS DURATION: 78 MS
QTC CALCULATION (BEZET): 456 MS
R AXIS: -45 DEGREES
RBC # BLD AUTO: 4.78 X10(6)UL
SARS-COV-2 RNA RESP QL NAA+PROBE: NOT DETECTED
SODIUM SERPL-SCNC: 135 MMOL/L (ref 136–145)
T AXIS: 50 DEGREES
TROPONIN I HIGH SENSITIVITY: 10 NG/L
TSI SER-ACNC: 0.88 MIU/ML (ref 0.36–3.74)
VENTRICULAR RATE: 71 BPM
WBC # BLD AUTO: 8.6 X10(3) UL (ref 4–11)

## 2022-02-21 PROCEDURE — 71046 X-RAY EXAM CHEST 2 VIEWS: CPT | Performed by: INTERNAL MEDICINE

## 2022-02-21 PROCEDURE — 70496 CT ANGIOGRAPHY HEAD: CPT | Performed by: EMERGENCY MEDICINE

## 2022-02-21 PROCEDURE — 70450 CT HEAD/BRAIN W/O DYE: CPT | Performed by: EMERGENCY MEDICINE

## 2022-02-21 PROCEDURE — 3E03317 INTRODUCTION OF OTHER THROMBOLYTIC INTO PERIPHERAL VEIN, PERCUTANEOUS APPROACH: ICD-10-PCS | Performed by: HOSPITALIST

## 2022-02-21 PROCEDURE — 70498 CT ANGIOGRAPHY NECK: CPT | Performed by: EMERGENCY MEDICINE

## 2022-02-21 PROCEDURE — 99291 CRITICAL CARE FIRST HOUR: CPT | Performed by: NURSE PRACTITIONER

## 2022-02-21 RX ORDER — HYDRALAZINE HYDROCHLORIDE 20 MG/ML
10 INJECTION INTRAMUSCULAR; INTRAVENOUS
Status: DISCONTINUED | OUTPATIENT
Start: 2022-02-21 | End: 2022-02-26

## 2022-02-21 RX ORDER — METHYLPREDNISOLONE SODIUM SUCCINATE 125 MG/2ML
125 INJECTION, POWDER, LYOPHILIZED, FOR SOLUTION INTRAMUSCULAR; INTRAVENOUS ONCE AS NEEDED
Status: ACTIVE | OUTPATIENT
Start: 2022-02-21 | End: 2022-02-21

## 2022-02-21 RX ORDER — ACETAMINOPHEN 650 MG/1
650 SUPPOSITORY RECTAL EVERY 4 HOURS PRN
Status: DISCONTINUED | OUTPATIENT
Start: 2022-02-21 | End: 2022-02-26

## 2022-02-21 RX ORDER — IOHEXOL 350 MG/ML
75 INJECTION, SOLUTION INTRAVENOUS
Status: COMPLETED | OUTPATIENT
Start: 2022-02-21 | End: 2022-02-21

## 2022-02-21 RX ORDER — LABETALOL HYDROCHLORIDE 5 MG/ML
10 INJECTION, SOLUTION INTRAVENOUS ONCE AS NEEDED
Status: COMPLETED | OUTPATIENT
Start: 2022-02-21 | End: 2022-02-21

## 2022-02-21 RX ORDER — SODIUM CHLORIDE 9 MG/ML
INJECTION, SOLUTION INTRAVENOUS CONTINUOUS
Status: DISCONTINUED | OUTPATIENT
Start: 2022-02-21 | End: 2022-02-23

## 2022-02-21 RX ORDER — ASPIRIN 81 MG/1
324 TABLET, CHEWABLE ORAL ONCE
Status: DISCONTINUED | OUTPATIENT
Start: 2022-02-21 | End: 2022-02-21

## 2022-02-21 RX ORDER — FAMOTIDINE 10 MG/ML
20 INJECTION, SOLUTION INTRAVENOUS ONCE AS NEEDED
Status: ACTIVE | OUTPATIENT
Start: 2022-02-21 | End: 2022-02-21

## 2022-02-21 RX ORDER — DIPHENHYDRAMINE HYDROCHLORIDE 50 MG/ML
50 INJECTION INTRAMUSCULAR; INTRAVENOUS ONCE AS NEEDED
Status: ACTIVE | OUTPATIENT
Start: 2022-02-21 | End: 2022-02-21

## 2022-02-21 NOTE — PROGRESS NOTES
Called to C3/C3 for Code Stroke. Arrived to department at 1540  Upon arrival found patient with NIH of at least 4, for left eye vision loss and left arm weakness. Last Known Normal at 13:30  Patient does not have contraindications for IV Tenecteplase. Patient initially had improvement of symptoms and were not going to proceed with TNK, but when vision remained an issue, it was decided to offer TNK. At this time, family did have several questions prior to agreeing with TNK, as well as having to reach out to further family members, which further caused delay in giving medication. Discussed case with Dr. Nicole Palma neurologist on call. Patient does meet criteria for neuro intervention. Contraindications include: no LVO on imaging. Discussed case with Dr. Lenard Rodrigues MD, bedside RN, patient, and family. Discussed completion of swallow evaluation prior to taking PO with primary RN.       CASSY Rodriguez/Stroke Coordinator  [place] [id number]  Pager [phone]  2/21/2022, 4:01 PM

## 2022-02-21 NOTE — ED QUICK NOTES
Patient Daughter in law in waiting room and asking to go back to see patient. Other family also in [place], family is already at bedside in room with patient. Made daughter in law aware that patient can only have 1 visitor at bedside and no switching out. Daughter in law states she is a doctor and should be the one at the bedside.  Daughter is already at the bedside and explained again no switching out can be done due to policy

## 2022-02-21 NOTE — ED QUICK NOTES
Patient daughter at bedside. Made aware she is the only visitor that can be at the bedside and no exchange can be done between visitors.  Can call family in [place] with updates and info

## 2022-02-21 NOTE — ED QUICK NOTES
Pt arrived via ems; pt daughter stated she got a call from pt at 1:30 stating he could not move his left arm; pt is a&ox4; pt denies any other deficits; pt has sensation in the left extremity  Code stroke called 1428  Pt taken to ct scan on monitor 1422  Pt transferred from bed to ct scan 1424  Ct scan started 1426  Ct scan stopped and pt arrived back to c3 1439   pt was accessed again by md at bedside; pt states he still cannot move left arm but he has more sensation now

## 2022-02-21 NOTE — ED INITIAL ASSESSMENT (HPI)
Per ems pt stated he call his daughter at 1:45 pm he experienced weakness in his left arm; pt denies any weakness, numbness in any other parts of his body; pt denies cp/ sob; pt is a&ox4      [facility] 6

## 2022-02-22 ENCOUNTER — APPOINTMENT (OUTPATIENT)
Dept: MRI IMAGING | Facility: HOSPITAL | Age: 87
DRG: 061 | End: 2022-02-22
Attending: EMERGENCY MEDICINE
Payer: COMMERCIAL

## 2022-02-22 ENCOUNTER — APPOINTMENT (OUTPATIENT)
Dept: GENERAL RADIOLOGY | Facility: HOSPITAL | Age: 87
DRG: 061 | End: 2022-02-22
Attending: NURSE PRACTITIONER
Payer: COMMERCIAL

## 2022-02-22 ENCOUNTER — APPOINTMENT (OUTPATIENT)
Dept: CV DIAGNOSTICS | Facility: HOSPITAL | Age: 87
DRG: 061 | End: 2022-02-22
Attending: INTERNAL MEDICINE
Payer: COMMERCIAL

## 2022-02-22 ENCOUNTER — APPOINTMENT (OUTPATIENT)
Dept: CT IMAGING | Facility: HOSPITAL | Age: 87
DRG: 061 | End: 2022-02-22
Attending: NURSE PRACTITIONER
Payer: COMMERCIAL

## 2022-02-22 ENCOUNTER — APPOINTMENT (OUTPATIENT)
Dept: GENERAL RADIOLOGY | Facility: HOSPITAL | Age: 87
DRG: 061 | End: 2022-02-22
Attending: Other
Payer: COMMERCIAL

## 2022-02-22 PROBLEM — J94.8 HYDROPNEUMOTHORAX: Status: ACTIVE | Noted: 2022-02-17

## 2022-02-22 LAB
ADENOVIRUS PCR:: NOT DETECTED
ANION GAP SERPL CALC-SCNC: 8 MMOL/L (ref 0–18)
ANION GAP SERPL CALC-SCNC: 9 MMOL/L (ref 0–18)
B PARAPERT DNA SPEC QL NAA+PROBE: NOT DETECTED
B PERT DNA SPEC QL NAA+PROBE: NOT DETECTED
BASOPHILS # BLD AUTO: 0.05 X10(3) UL (ref 0–0.2)
BASOPHILS # BLD AUTO: 0.05 X10(3) UL (ref 0–0.2)
BASOPHILS NFR BLD AUTO: 0.4 %
BASOPHILS NFR BLD AUTO: 0.6 %
BILIRUB UR QL STRIP.AUTO: NEGATIVE
BUN BLD-MCNC: 14 MG/DL (ref 7–18)
BUN BLD-MCNC: 21 MG/DL (ref 7–18)
C PNEUM DNA SPEC QL NAA+PROBE: NOT DETECTED
CALCIUM BLD-MCNC: 5.9 MG/DL (ref 8.5–10.1)
CALCIUM BLD-MCNC: 9 MG/DL (ref 8.5–10.1)
CHLORIDE SERPL-SCNC: 104 MMOL/L (ref 98–112)
CHLORIDE SERPL-SCNC: 119 MMOL/L (ref 98–112)
CLARITY UR REFRACT.AUTO: CLEAR
CO2 SERPL-SCNC: 18 MMOL/L (ref 21–32)
CO2 SERPL-SCNC: 26 MMOL/L (ref 21–32)
COLOR UR AUTO: YELLOW
CORONAVIRUS 229E PCR:: NOT DETECTED
CORONAVIRUS HKU1 PCR:: NOT DETECTED
CORONAVIRUS NL63 PCR:: NOT DETECTED
CORONAVIRUS OC43 PCR:: NOT DETECTED
CREAT BLD-MCNC: 0.86 MG/DL
CREAT BLD-MCNC: 1.52 MG/DL
EOSINOPHIL # BLD AUTO: 0.01 X10(3) UL (ref 0–0.7)
EOSINOPHIL # BLD AUTO: 0.01 X10(3) UL (ref 0–0.7)
EOSINOPHIL NFR BLD AUTO: 0.1 %
EOSINOPHIL NFR BLD AUTO: 0.1 %
ERYTHROCYTE [DISTWIDTH] IN BLOOD BY AUTOMATED COUNT: 13.6 %
ERYTHROCYTE [DISTWIDTH] IN BLOOD BY AUTOMATED COUNT: 13.8 %
FLUAV RNA SPEC QL NAA+PROBE: NOT DETECTED
FLUBV RNA SPEC QL NAA+PROBE: NOT DETECTED
GLUCOSE BLD-MCNC: 138 MG/DL (ref 70–99)
GLUCOSE BLD-MCNC: 148 MG/DL (ref 70–99)
GLUCOSE BLD-MCNC: 149 MG/DL (ref 70–99)
GLUCOSE BLD-MCNC: 173 MG/DL (ref 70–99)
GLUCOSE BLD-MCNC: 174 MG/DL (ref 70–99)
GLUCOSE BLD-MCNC: 183 MG/DL (ref 70–99)
GLUCOSE BLD-MCNC: 201 MG/DL (ref 70–99)
GLUCOSE UR STRIP.AUTO-MCNC: >=500 MG/DL
HCT VFR BLD AUTO: 29 %
HCT VFR BLD AUTO: 40.3 %
HGB BLD-MCNC: 13.3 G/DL
HGB BLD-MCNC: 9.5 G/DL
IMM GRANULOCYTES # BLD AUTO: 0.04 X10(3) UL (ref 0–1)
IMM GRANULOCYTES # BLD AUTO: 0.05 X10(3) UL (ref 0–1)
IMM GRANULOCYTES NFR BLD: 0.3 %
IMM GRANULOCYTES NFR BLD: 0.6 %
KETONES UR STRIP.AUTO-MCNC: 20 MG/DL
LEUKOCYTE ESTERASE UR QL STRIP.AUTO: NEGATIVE
LYMPHOCYTES # BLD AUTO: 0.59 X10(3) UL (ref 1–4)
LYMPHOCYTES # BLD AUTO: 0.71 X10(3) UL (ref 1–4)
LYMPHOCYTES NFR BLD AUTO: 6.1 %
LYMPHOCYTES NFR BLD AUTO: 6.5 %
MCH RBC QN AUTO: 26.6 PG (ref 26–34)
MCH RBC QN AUTO: 26.8 PG (ref 26–34)
MCHC RBC AUTO-ENTMCNC: 32.8 G/DL (ref 31–37)
MCHC RBC AUTO-ENTMCNC: 33 G/DL (ref 31–37)
MCV RBC AUTO: 80.6 FL
MCV RBC AUTO: 81.7 FL
METAPNEUMOVIRUS PCR:: NOT DETECTED
MONOCYTES # BLD AUTO: 0.48 X10(3) UL (ref 0.1–1)
MONOCYTES # BLD AUTO: 0.94 X10(3) UL (ref 0.1–1)
MONOCYTES NFR BLD AUTO: 5.3 %
MONOCYTES NFR BLD AUTO: 8.1 %
MYCOPLASMA PNEUMONIA PCR:: NOT DETECTED
NEUTROPHILS # BLD AUTO: 7.9 X10 (3) UL (ref 1.5–7.7)
NEUTROPHILS # BLD AUTO: 7.9 X10(3) UL (ref 1.5–7.7)
NEUTROPHILS # BLD AUTO: 9.87 X10 (3) UL (ref 1.5–7.7)
NEUTROPHILS # BLD AUTO: 9.87 X10(3) UL (ref 1.5–7.7)
NEUTROPHILS NFR BLD AUTO: 85 %
NEUTROPHILS NFR BLD AUTO: 86.9 %
NITRITE UR QL STRIP.AUTO: NEGATIVE
OSMOLALITY SERPL CALC.SUM OF ELEC: 295 MOSM/KG (ref 275–295)
OSMOLALITY SERPL CALC.SUM OF ELEC: 305 MOSM/KG (ref 275–295)
PARAINFLUENZA 1 PCR:: NOT DETECTED
PARAINFLUENZA 2 PCR:: NOT DETECTED
PARAINFLUENZA 3 PCR:: NOT DETECTED
PARAINFLUENZA 4 PCR:: NOT DETECTED
PH UR STRIP.AUTO: 7 [PH] (ref 5–8)
PLATELET # BLD AUTO: 257 10(3)UL (ref 150–450)
PLATELET # BLD AUTO: 327 10(3)UL (ref 150–450)
POTASSIUM SERPL-SCNC: 2.8 MMOL/L (ref 3.5–5.1)
POTASSIUM SERPL-SCNC: 4.2 MMOL/L (ref 3.5–5.1)
PROCALCITONIN SERPL-MCNC: <0.05 NG/ML (ref ?–0.16)
PROT UR STRIP.AUTO-MCNC: 30 MG/DL
RBC # BLD AUTO: 3.55 X10(6)UL
RBC # BLD AUTO: 5 X10(6)UL
RHINOVIRUS/ENTERO PCR:: NOT DETECTED
SARS-COV-2 RNA NPH QL NAA+NON-PROBE: NOT DETECTED
SODIUM SERPL-SCNC: 138 MMOL/L (ref 136–145)
SODIUM SERPL-SCNC: 146 MMOL/L (ref 136–145)
SP GR UR STRIP.AUTO: 1.01 (ref 1–1.03)
UROBILINOGEN UR STRIP.AUTO-MCNC: <2 MG/DL
WBC # BLD AUTO: 11.6 X10(3) UL (ref 4–11)
WBC # BLD AUTO: 9.1 X10(3) UL (ref 4–11)

## 2022-02-22 PROCEDURE — 70450 CT HEAD/BRAIN W/O DYE: CPT | Performed by: NURSE PRACTITIONER

## 2022-02-22 PROCEDURE — 71045 X-RAY EXAM CHEST 1 VIEW: CPT | Performed by: OTHER

## 2022-02-22 PROCEDURE — 71045 X-RAY EXAM CHEST 1 VIEW: CPT | Performed by: NURSE PRACTITIONER

## 2022-02-22 PROCEDURE — 70551 MRI BRAIN STEM W/O DYE: CPT | Performed by: EMERGENCY MEDICINE

## 2022-02-22 PROCEDURE — 93308 TTE F-UP OR LMTD: CPT | Performed by: INTERNAL MEDICINE

## 2022-02-22 PROCEDURE — 99291 CRITICAL CARE FIRST HOUR: CPT | Performed by: OTHER

## 2022-02-22 RX ORDER — HEPARIN SODIUM 5000 [USP'U]/ML
5000 INJECTION, SOLUTION INTRAVENOUS; SUBCUTANEOUS EVERY 8 HOURS SCHEDULED
Status: DISCONTINUED | OUTPATIENT
Start: 2022-02-22 | End: 2022-02-23

## 2022-02-22 RX ORDER — BISACODYL 10 MG
10 SUPPOSITORY, RECTAL RECTAL
Status: DISCONTINUED | OUTPATIENT
Start: 2022-02-22 | End: 2022-02-26

## 2022-02-22 RX ORDER — ALBUTEROL SULFATE 2.5 MG/3ML
2.5 SOLUTION RESPIRATORY (INHALATION) EVERY 4 HOURS PRN
Status: DISCONTINUED | OUTPATIENT
Start: 2022-02-22 | End: 2022-02-26

## 2022-02-22 RX ORDER — METHYLPREDNISOLONE SODIUM SUCCINATE 125 MG/2ML
125 INJECTION, POWDER, LYOPHILIZED, FOR SOLUTION INTRAMUSCULAR; INTRAVENOUS ONCE AS NEEDED
Status: ACTIVE | OUTPATIENT
Start: 2022-02-22 | End: 2022-02-22

## 2022-02-22 RX ORDER — VANCOMYCIN HYDROCHLORIDE
25 ONCE
Status: COMPLETED | OUTPATIENT
Start: 2022-02-22 | End: 2022-02-22

## 2022-02-22 RX ORDER — LEVOTHYROXINE SODIUM 0.05 MG/1
50 TABLET ORAL DAILY
Status: DISCONTINUED | OUTPATIENT
Start: 2022-02-22 | End: 2022-02-26

## 2022-02-22 RX ORDER — LABETALOL HYDROCHLORIDE 5 MG/ML
10 INJECTION, SOLUTION INTRAVENOUS EVERY 4 HOURS PRN
Status: DISCONTINUED | OUTPATIENT
Start: 2022-02-22 | End: 2022-02-26

## 2022-02-22 RX ORDER — ASPIRIN 300 MG/1
300 SUPPOSITORY RECTAL DAILY
Status: DISCONTINUED | OUTPATIENT
Start: 2022-02-22 | End: 2022-02-23

## 2022-02-22 RX ORDER — FAMOTIDINE 10 MG/ML
20 INJECTION, SOLUTION INTRAVENOUS ONCE AS NEEDED
Status: ACTIVE | OUTPATIENT
Start: 2022-02-22 | End: 2022-02-22

## 2022-02-22 RX ORDER — PANTOPRAZOLE SODIUM 40 MG/1
40 TABLET, DELAYED RELEASE ORAL
Refills: 3 | Status: DISCONTINUED | OUTPATIENT
Start: 2022-02-22 | End: 2022-02-23 | Stop reason: SDUPTHER

## 2022-02-22 RX ORDER — ATORVASTATIN CALCIUM 40 MG/1
40 TABLET, FILM COATED ORAL NIGHTLY
Status: DISCONTINUED | OUTPATIENT
Start: 2022-02-22 | End: 2022-02-26

## 2022-02-22 RX ORDER — DIPHENHYDRAMINE HYDROCHLORIDE 50 MG/ML
50 INJECTION INTRAMUSCULAR; INTRAVENOUS ONCE AS NEEDED
Status: ACTIVE | OUTPATIENT
Start: 2022-02-22 | End: 2022-02-22

## 2022-02-22 NOTE — OCCUPATIONAL THERAPY NOTE
Attempted 2/22 with pt currently within 24 hour timeframe since receiving TNK (3:50pm 2/21) and not appropriate for mobilization until 3:50pm 2/22.  Will attempt later as able

## 2022-02-22 NOTE — PLAN OF CARE
There is a very faint facial droop. It is most noticeable when pt shows teeth. Nasal-labial folds match, but chin and lower lip suggest slight droop. Once, early in shift, patient able to move fingers on L hand to command, but not since. Beginning with 0200 assessment, left lower extremity slightly weaker than right. At three, weakness persists in LLE, APN notified. At 0600 assessment, patient less alert, unable to lift L leg off bed. APN notified, stat CT ordered, CT completed. Three family members present with patient through 2030 last night. From ~0530 this morning, three family members here, two at bedside. Pt passing flatus, c/o need for bowel movement several times. States normally has BM 2x day, last BM in AM 2/21. Offered bedpan, reminded of strict bedrest orders. Pt spoke to son at 36 over phone per patient request. RN spoke with son as well. Both son and RN informed pt of bedrest requirements for time being. Pt expressed reluctance but understanding to son.

## 2022-02-22 NOTE — PHYSICAL THERAPY NOTE
Attempted 2/22 with pt currently within 24 hour timeframe since receiving TNK (3:50pm 2/21) and not appropriate for mobilization until 3:50pm 2/22. Will attempt later as able.

## 2022-02-22 NOTE — PLAN OF CARE
Received from ED around 1730 s/p TNK. Consult called into Dr Slime Rosario cardiology. Dr Roz Aiken at bedside. Spoke with Dr Candy Streeter on phone, placed on speaker phone for family to speak with as well. AFIB on monitors, RA, SBP goal <180. Q15 minute VS/Neuro assessments until 1800, then q30 minutes, stable and unchanged. Left arm withdrawals from pain and stiff, slight decreased sensation noted. Vision deficits are at baseline-not new per family as they were seeking eye doctor prior to this admission. Family at bedside, discussed visiting policies.

## 2022-02-23 LAB
ANION GAP SERPL CALC-SCNC: 6 MMOL/L (ref 0–18)
BASOPHILS # BLD AUTO: 0.09 X10(3) UL (ref 0–0.2)
BASOPHILS NFR BLD AUTO: 0.7 %
BUN BLD-MCNC: 25 MG/DL (ref 7–18)
CALCIUM BLD-MCNC: 8.8 MG/DL (ref 8.5–10.1)
CO2 SERPL-SCNC: 28 MMOL/L (ref 21–32)
CREAT BLD-MCNC: 1.57 MG/DL
EOSINOPHIL # BLD AUTO: 0.92 X10(3) UL (ref 0–0.7)
EOSINOPHIL NFR BLD AUTO: 7.4 %
ERYTHROCYTE [DISTWIDTH] IN BLOOD BY AUTOMATED COUNT: 13.7 %
GLUCOSE BLD-MCNC: 131 MG/DL (ref 70–99)
GLUCOSE BLD-MCNC: 134 MG/DL (ref 70–99)
GLUCOSE BLD-MCNC: 135 MG/DL (ref 70–99)
GLUCOSE BLD-MCNC: 171 MG/DL (ref 70–99)
HCT VFR BLD AUTO: 37.6 %
HGB BLD-MCNC: 11.6 G/DL
IMM GRANULOCYTES # BLD AUTO: 0.05 X10(3) UL (ref 0–1)
IMM GRANULOCYTES NFR BLD: 0.4 %
LYMPHOCYTES # BLD AUTO: 1.4 X10(3) UL (ref 1–4)
MCH RBC QN AUTO: 25.8 PG (ref 26–34)
MCHC RBC AUTO-ENTMCNC: 30.9 G/DL (ref 31–37)
MCV RBC AUTO: 83.7 FL
MONOCYTES # BLD AUTO: 1.16 X10(3) UL (ref 0.1–1)
MONOCYTES NFR BLD AUTO: 9.3 %
NEUTROPHILS # BLD AUTO: 8.84 X10 (3) UL (ref 1.5–7.7)
NEUTROPHILS # BLD AUTO: 8.84 X10(3) UL (ref 1.5–7.7)
NEUTROPHILS NFR BLD AUTO: 71 %
OSMOLALITY SERPL CALC.SUM OF ELEC: 302 MOSM/KG (ref 275–295)
PLATELET # BLD AUTO: 316 10(3)UL (ref 150–450)
POTASSIUM SERPL-SCNC: 3.6 MMOL/L (ref 3.5–5.1)
POTASSIUM SERPL-SCNC: 4.6 MMOL/L (ref 3.5–5.1)
PROCALCITONIN SERPL-MCNC: 0.07 NG/ML (ref ?–0.16)
RBC # BLD AUTO: 4.49 X10(6)UL
SODIUM SERPL-SCNC: 143 MMOL/L (ref 136–145)
WBC # BLD AUTO: 12.5 X10(3) UL (ref 4–11)

## 2022-02-23 PROCEDURE — 99233 SBSQ HOSP IP/OBS HIGH 50: CPT | Performed by: OTHER

## 2022-02-23 RX ORDER — ATORVASTATIN CALCIUM 80 MG/1
80 TABLET, FILM COATED ORAL NIGHTLY
Status: DISCONTINUED | OUTPATIENT
Start: 2022-02-23 | End: 2022-02-23

## 2022-02-23 RX ORDER — TAMSULOSIN HYDROCHLORIDE 0.4 MG/1
0.4 CAPSULE ORAL DAILY
Status: DISCONTINUED | OUTPATIENT
Start: 2022-02-23 | End: 2022-02-26

## 2022-02-23 RX ORDER — ASPIRIN 325 MG
325 TABLET ORAL DAILY
Status: DISCONTINUED | OUTPATIENT
Start: 2022-02-23 | End: 2022-02-24

## 2022-02-23 RX ORDER — MELATONIN
3 NIGHTLY PRN
Status: DISCONTINUED | OUTPATIENT
Start: 2022-02-23 | End: 2022-02-24

## 2022-02-23 RX ORDER — ACETAMINOPHEN 160 MG/5ML
650 SOLUTION ORAL EVERY 6 HOURS PRN
Status: DISCONTINUED | OUTPATIENT
Start: 2022-02-23 | End: 2022-02-26

## 2022-02-23 RX ORDER — ONDANSETRON 2 MG/ML
4 INJECTION INTRAMUSCULAR; INTRAVENOUS EVERY 6 HOURS PRN
Status: DISCONTINUED | OUTPATIENT
Start: 2022-02-23 | End: 2022-02-26

## 2022-02-23 RX ORDER — PANTOPRAZOLE SODIUM 40 MG/1
40 TABLET, DELAYED RELEASE ORAL
Status: DISCONTINUED | OUTPATIENT
Start: 2022-02-23 | End: 2022-02-26

## 2022-02-23 RX ORDER — POTASSIUM CHLORIDE 1.5 G/1.77G
40 POWDER, FOR SOLUTION ORAL EVERY 4 HOURS
Status: COMPLETED | OUTPATIENT
Start: 2022-02-23 | End: 2022-02-23

## 2022-02-23 RX ORDER — METOCLOPRAMIDE HYDROCHLORIDE 5 MG/ML
5 INJECTION INTRAMUSCULAR; INTRAVENOUS EVERY 8 HOURS PRN
Status: DISCONTINUED | OUTPATIENT
Start: 2022-02-23 | End: 2022-02-26

## 2022-02-23 RX ORDER — LOSARTAN POTASSIUM 100 MG/1
100 TABLET ORAL DAILY
Status: DISCONTINUED | OUTPATIENT
Start: 2022-02-23 | End: 2022-02-26

## 2022-02-23 RX ORDER — ASPIRIN 81 MG/1
TABLET, CHEWABLE ORAL
Status: DISPENSED
Start: 2022-02-23 | End: 2022-02-23

## 2022-02-23 NOTE — PLAN OF CARE
Received patient A/Ox4, JANICE,NSR on tele around 1200  Neuro q4, LUE weakness  Tube feeds initiated  Tylenol for knee pain  Incontinent, bladder scan as needed  Family bedside during the day

## 2022-02-23 NOTE — PLAN OF CARE
Assumed care of patient at 0730. Patient is lethargic but answering questions appropriately and following commands. Left arm willstill only withdrawal to pain. Neuros q1 intact otherwise. 102 fever x2 today, tylenol given both times with use of ice packs. Temp came down to 99. UA, resp panel, covid, cxr all completed for workup of temp. Abx on board. Patient sbp stayed under 180. Patient has gone in and out of afib today, MD aware. Patient failed swallow study, dobhoff placed and CXR ordered. Glucose q6. Bjorn Chalet in place with good urine output. MRI, CT, and echo all completed today. See assessment for further details.

## 2022-02-23 NOTE — CM/SW NOTE
Patient discussed in rounds. Spoke w/OT, therapy recommending acute rehab. Plan of Care/Barriers to discharge: Will tx out of [facility] today    DC Plan:  From home w/family. Being recommended for acute rehab, PMR consult placed. dtr in law at bedside, hopeful to speak with them later today. Manuela Blakely liaison w/[facility] notified. / to remain available for any further discharge planning needs.     Kindra Prince RN,   Phone [phone]

## 2022-02-23 NOTE — PLAN OF CARE
Assumed care of pt at approximately 1900. Pt alert and orientedX3- disoriented to situation. [id number]- left sided paralysis, left facial droop. See chart for full assessment. Accelerated junctional rhythm on monitor. SBP goal <180/150- normotensive throughout night. Male external catheter in place. Pt bladder scanned @ 0630 with highest recorded volume 404 mL- straight cathX1 with 300 mL out. Transfer orders in place. Pt and family updated on POC. Will continue to monitor.      Problem: HEMATOLOGIC - ADULT  Goal: Free from bleeding injury  Description: (Example usage: patient with low platelets)  INTERVENTIONS:  - Avoid intramuscular injections, enemas and rectal medication administration  - Ensure safe mobilization of patient  - Hold pressure on venipuncture sites to achieve adequate hemostasis  - Assess for signs and symptoms of internal bleeding  - Monitor lab trends  - Patient is to report abnormal signs of bleeding to staff  - Avoid use of toothpicks and dental floss  - Use electric shaver for shaving  - Use soft bristle tooth brush  - Limit straining and forceful nose blowing  Outcome: Progressing     Problem: CARDIOVASCULAR - ADULT  Goal: Absence of cardiac arrhythmias or at baseline  Description: INTERVENTIONS:  - Continuous cardiac monitoring, monitor vital signs, obtain 12 lead EKG if indicated  - Evaluate effectiveness of antiarrhythmic and heart rate control medications as ordered  - Initiate emergency measures for life threatening arrhythmias  - Monitor electrolytes and administer replacement therapy as ordered  Outcome: Progressing     Problem: NEUROLOGICAL - ADULT  Goal: Achieves stable or improved neurological status  Description: INTERVENTIONS  - Assess for and report changes in neurological status  - Initiate measures to prevent increased intracranial pressure  - Maintain blood pressure and fluid volume within ordered parameters to optimize cerebral perfusion and minimize risk of hemorrhage  - Monitor temperature, glucose, and sodium.  Initiate appropriate interventions as ordered  Outcome: Progressing

## 2022-02-24 ENCOUNTER — APPOINTMENT (OUTPATIENT)
Dept: GENERAL RADIOLOGY | Facility: HOSPITAL | Age: 87
DRG: 061 | End: 2022-02-24
Attending: HOSPITALIST
Payer: COMMERCIAL

## 2022-02-24 LAB
ANION GAP SERPL CALC-SCNC: 5 MMOL/L (ref 0–18)
APTT PPP: 35.4 SECONDS (ref 23.3–35.6)
BUN BLD-MCNC: 28 MG/DL (ref 7–18)
CALCIUM BLD-MCNC: 8.4 MG/DL (ref 8.5–10.1)
CHLORIDE SERPL-SCNC: 112 MMOL/L (ref 98–112)
CO2 SERPL-SCNC: 24 MMOL/L (ref 21–32)
CREAT BLD-MCNC: 1.55 MG/DL
ERYTHROCYTE [DISTWIDTH] IN BLOOD BY AUTOMATED COUNT: 14.1 %
GLUCOSE BLD-MCNC: 182 MG/DL (ref 70–99)
GLUCOSE BLD-MCNC: 191 MG/DL (ref 70–99)
GLUCOSE BLD-MCNC: 198 MG/DL (ref 70–99)
GLUCOSE BLD-MCNC: 205 MG/DL (ref 70–99)
GLUCOSE BLD-MCNC: 252 MG/DL (ref 70–99)
GLUCOSE BLD-MCNC: 256 MG/DL (ref 70–99)
HCT VFR BLD AUTO: 34.5 %
HGB BLD-MCNC: 11 G/DL
MAGNESIUM SERPL-MCNC: 2.3 MG/DL (ref 1.6–2.6)
MCH RBC QN AUTO: 26.7 PG (ref 26–34)
MCHC RBC AUTO-ENTMCNC: 31.9 G/DL (ref 31–37)
MCV RBC AUTO: 83.7 FL
OSMOLALITY SERPL CALC.SUM OF ELEC: 303 MOSM/KG (ref 275–295)
PLATELET # BLD AUTO: 260 10(3)UL (ref 150–450)
POTASSIUM SERPL-SCNC: 4.2 MMOL/L (ref 3.5–5.1)
RBC # BLD AUTO: 4.12 X10(6)UL
SODIUM SERPL-SCNC: 141 MMOL/L (ref 136–145)
WBC # BLD AUTO: 10 X10(3) UL (ref 4–11)

## 2022-02-24 PROCEDURE — 74230 X-RAY XM SWLNG FUNCJ C+: CPT | Performed by: HOSPITALIST

## 2022-02-24 PROCEDURE — 99232 SBSQ HOSP IP/OBS MODERATE 35: CPT | Performed by: OTHER

## 2022-02-24 RX ORDER — METOPROLOL SUCCINATE 25 MG/1
25 TABLET, EXTENDED RELEASE ORAL
Status: DISCONTINUED | OUTPATIENT
Start: 2022-02-25 | End: 2022-02-24

## 2022-02-24 NOTE — CM/SW NOTE
Pt discussed in rounds, plan for PMR consult today. SW started AR referral in [place]. Will follow up with pt/family on dc recommendations.      Lalo 106, LSW

## 2022-02-24 NOTE — PLAN OF CARE
Received patient A/Ox4, RA, NSR on tele at 0730  Neuro q4, LUE weakness  1 degree AVB noted on monitor, cardiology aware, see orders  Video swallow completed, tolerating new diet, dobohff removed per order  Denying pain  Up with 2 max  Voiding per ex-cath, trouble starting stream, aiding by ice   Family at bedside during the day

## 2022-02-24 NOTE — PLAN OF CARE
Assumed care at [address]. Pt a/ox4. Neuro assessment unchanged. VSS. NSR per tele. RA. Tylenol prn given for pain with relief. Tube feeds infusing via dobhoff, tolerating well. On IV abx. Pt and family updated with POC and care partner info. Call light in reach. Needs attended to.

## 2022-02-25 LAB
GLUCOSE BLD-MCNC: 148 MG/DL (ref 70–99)
GLUCOSE BLD-MCNC: 151 MG/DL (ref 70–99)
GLUCOSE BLD-MCNC: 172 MG/DL (ref 70–99)
GLUCOSE BLD-MCNC: 222 MG/DL (ref 70–99)

## 2022-02-25 RX ORDER — FUROSEMIDE 40 MG/1
40 TABLET ORAL 2 TIMES DAILY
Status: DISCONTINUED | OUTPATIENT
Start: 2022-02-25 | End: 2022-02-26

## 2022-02-25 NOTE — PLAN OF CARE
Assumed care at 1. Pt a/ox4. VSS. NSR per tele. RA. Denies pain. External cath in use. Pt and family updated with POC. Call light in reach. Needs attended to.

## 2022-02-25 NOTE — PLAN OF CARE
Assumed care at 0700  Patient alert, oriented x4  No acute neurological changes  Lasix restarted  Denies pain    Plan to dc to [facility] tomorrow at 1pm. Patient and family updated on plan of care

## 2022-02-25 NOTE — CM/SW NOTE
SW met with pt and family at bedside. Discussed AR options. Decided on [facility]. Insurance Darlina Mowers is needed. SW to put ambulance on will call for when insurance Darlina Mowers is approved. PCS to be completed as well. RN notified. Requested rapid covid test, as it is valid for 72 hours.     Lalo 106, LSW

## 2022-02-26 VITALS
DIASTOLIC BLOOD PRESSURE: 42 MMHG | RESPIRATION RATE: 22 BRPM | SYSTOLIC BLOOD PRESSURE: 144 MMHG | TEMPERATURE: 99 F | OXYGEN SATURATION: 95 % | BODY MASS INDEX: 22 KG/M2 | WEIGHT: 133.19 LBS | HEART RATE: 61 BPM

## 2022-02-26 LAB
GLUCOSE BLD-MCNC: 211 MG/DL (ref 70–99)
SARS-COV-2 RNA RESP QL NAA+PROBE: NOT DETECTED

## 2022-02-26 RX ORDER — TAMSULOSIN HYDROCHLORIDE 0.4 MG/1
0.4 CAPSULE ORAL DAILY
Qty: 30 CAPSULE | Refills: 0 | Status: SHIPPED | OUTPATIENT
Start: 2022-02-27 | End: 2022-03-29

## 2022-02-26 RX ORDER — LEVOTHYROXINE SODIUM 0.05 MG/1
50 TABLET ORAL DAILY
Qty: 30 TABLET | Refills: 0 | Status: SHIPPED | COMMUNITY
Start: 2022-02-26

## 2022-02-26 RX ORDER — ATORVASTATIN CALCIUM 40 MG/1
40 TABLET, FILM COATED ORAL NIGHTLY
Qty: 30 TABLET | Refills: 0 | Status: SHIPPED | OUTPATIENT
Start: 2022-02-26 | End: 2022-04-04

## 2022-02-26 NOTE — CM/SW NOTE
02/26/22 1100   Discharge disposition   Expected discharge disposition [facility] [address] Provider Meagan MORALES   Discharge transportation [facility]     CM spoke to patient's RN to verify patient is medically cleared for discharge today. CM spoke to Liaison at [facility] with discharge update, can accept patient today in [address]. [facility] transport scheduled for [employer], PCS form completed previously by Janna Finnegan.  RN and patient/family updated on [facility]. RN to call [phone] for transfer report.      Shubham Doll, RN Case Manager [id number]

## 2022-02-26 NOTE — PLAN OF CARE
Assumed care at 0700  Patient alert, oriented x4  No acute neurological changes  + BM today  Denies pain, sob  Up to chair with 2 assist  Tolerating diet without difficulty  meds crushed in applesauce    All consults cleared for discharge. Copy of AVS given to family. Discharged to [facility] via ambulance. Report called to RN    NURSING DISCHARGE NOTE    Discharged Rehab facility via Ambulance. Accompanied by Family member  Belongings Taken by patient/family.

## 2022-02-26 NOTE — PLAN OF CARE
Assumed care at 1930  Neuro assessment unchanged  Daughter at bedside  IV Zosyn  External catheter with large urine output  Multiple bowel movements overnight  Plan for discharge to [facility] today

## 2022-03-09 NOTE — PROGRESS NOTES
Pt notified of results while inpatient at [facility].     ASSESSMENT/PLAN:  1. Fevers:  -PCT negative. Now afebrile   -CXR with some ill defined infiltrates in right lung, ? atelectasis from hydropneumothorax vs developing PNA  -pan-culture with NGTD. On empric zosyn started 2/22. PCT negative. Plan on 5 day course   -rapid covid negative, expanded RVP negative. ? also central fever s/p CVA. -Deescalate abx pending cultures. 2. 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 Original effusion found 6/2021.   -consider pleurX if sx develop in the future.

## 2022-03-23 PROBLEM — Z86.73 HISTORY OF CVA (CEREBROVASCULAR ACCIDENT): Status: ACTIVE | Noted: 2022-03-23

## 2022-03-23 PROBLEM — I48.0 PAROXYSMAL ATRIAL FIBRILLATION (HCC): Status: ACTIVE | Noted: 2022-03-23

## 2022-03-23 PROBLEM — J94.8 HYDROPNEUMOTHORAX: Status: RESOLVED | Noted: 2022-02-17 | Resolved: 2022-03-23

## 2022-03-23 PROBLEM — R73.9 HYPERGLYCEMIA: Status: RESOLVED | Noted: 2022-02-21 | Resolved: 2022-03-23

## 2022-03-23 PROBLEM — R53.1 LEFT-SIDED WEAKNESS: Status: RESOLVED | Noted: 2022-02-21 | Resolved: 2022-03-23

## 2022-03-23 PROBLEM — E87.1 HYPONATREMIA: Status: RESOLVED | Noted: 2022-02-21 | Resolved: 2022-03-23

## 2022-05-23 ENCOUNTER — OFFICE VISIT (OUTPATIENT)
Dept: NEUROLOGY | Facility: CLINIC | Age: 87
End: 2022-05-23
Payer: COMMERCIAL

## 2022-05-23 VITALS
SYSTOLIC BLOOD PRESSURE: 138 MMHG | HEIGHT: 65 IN | BODY MASS INDEX: 22.16 KG/M2 | DIASTOLIC BLOOD PRESSURE: 54 MMHG | WEIGHT: 133 LBS | RESPIRATION RATE: 16 BRPM | HEART RATE: 59 BPM

## 2022-05-23 DIAGNOSIS — I69.30 HISTORY OF STROKE WITH RESIDUAL DEFICIT: Primary | ICD-10-CM

## 2022-05-23 RX ORDER — TAMSULOSIN HYDROCHLORIDE 0.4 MG/1
0.4 CAPSULE ORAL DAILY
COMMUNITY
Start: 2022-04-14 | End: 2022-07-13

## 2024-03-26 ENCOUNTER — HOSPITAL ENCOUNTER (INPATIENT)
Facility: HOSPITAL | Age: 89
LOS: 8 days | Discharge: HOME HEALTH | End: 2024-04-06
Attending: EMERGENCY MEDICINE | Admitting: HOSPITALIST
Payer: COMMERCIAL

## 2024-03-26 ENCOUNTER — APPOINTMENT (OUTPATIENT)
Dept: GENERAL RADIOLOGY | Facility: HOSPITAL | Age: 89
End: 2024-03-26
Payer: COMMERCIAL

## 2024-03-26 DIAGNOSIS — J90 PLEURAL EFFUSION ON RIGHT: ICD-10-CM

## 2024-03-26 DIAGNOSIS — J44.1 COPD EXACERBATION (HCC): Primary | ICD-10-CM

## 2024-03-26 DIAGNOSIS — J21.0 ACUTE BRONCHIOLITIS DUE TO RESPIRATORY SYNCYTIAL VIRUS (RSV): ICD-10-CM

## 2024-03-26 LAB
ALBUMIN SERPL-MCNC: 3.4 G/DL (ref 3.4–5)
ALBUMIN/GLOB SERPL: 0.7 {RATIO} (ref 1–2)
ALP LIVER SERPL-CCNC: 103 U/L
ALT SERPL-CCNC: 18 U/L
ANION GAP SERPL CALC-SCNC: 4 MMOL/L (ref 0–18)
AST SERPL-CCNC: 18 U/L (ref 15–37)
BASOPHILS # BLD AUTO: 0.04 X10(3) UL (ref 0–0.2)
BASOPHILS NFR BLD AUTO: 0.4 %
BILIRUB SERPL-MCNC: 0.7 MG/DL (ref 0.1–2)
BUN BLD-MCNC: 25 MG/DL (ref 9–23)
CALCIUM BLD-MCNC: 10 MG/DL (ref 8.5–10.1)
CHLORIDE SERPL-SCNC: 104 MMOL/L (ref 98–112)
CO2 SERPL-SCNC: 29 MMOL/L (ref 21–32)
CREAT BLD-MCNC: 1.82 MG/DL
EGFRCR SERPLBLD CKD-EPI 2021: 35 ML/MIN/1.73M2 (ref 60–?)
EOSINOPHIL # BLD AUTO: 0.36 X10(3) UL (ref 0–0.7)
EOSINOPHIL NFR BLD AUTO: 4 %
ERYTHROCYTE [DISTWIDTH] IN BLOOD BY AUTOMATED COUNT: 14.3 %
FLUAV + FLUBV RNA SPEC NAA+PROBE: NEGATIVE
FLUAV + FLUBV RNA SPEC NAA+PROBE: NEGATIVE
GLOBULIN PLAS-MCNC: 5.1 G/DL (ref 2.8–4.4)
GLUCOSE BLD-MCNC: 217 MG/DL (ref 70–99)
HCT VFR BLD AUTO: 37.5 %
HGB BLD-MCNC: 11.7 G/DL
IMM GRANULOCYTES # BLD AUTO: 0.03 X10(3) UL (ref 0–1)
IMM GRANULOCYTES NFR BLD: 0.3 %
LACTATE SERPL-SCNC: 1.5 MMOL/L (ref 0.4–2)
LYMPHOCYTES # BLD AUTO: 0.92 X10(3) UL (ref 1–4)
LYMPHOCYTES NFR BLD AUTO: 10.2 %
MCH RBC QN AUTO: 26.1 PG (ref 26–34)
MCHC RBC AUTO-ENTMCNC: 31.2 G/DL (ref 31–37)
MCV RBC AUTO: 83.7 FL
MONOCYTES # BLD AUTO: 0.98 X10(3) UL (ref 0.1–1)
MONOCYTES NFR BLD AUTO: 10.9 %
NEUTROPHILS # BLD AUTO: 6.67 X10 (3) UL (ref 1.5–7.7)
NEUTROPHILS # BLD AUTO: 6.67 X10(3) UL (ref 1.5–7.7)
NEUTROPHILS NFR BLD AUTO: 74.2 %
NT-PROBNP SERPL-MCNC: 3090 PG/ML (ref ?–450)
OSMOLALITY SERPL CALC.SUM OF ELEC: 295 MOSM/KG (ref 275–295)
PLATELET # BLD AUTO: 318 10(3)UL (ref 150–450)
POTASSIUM SERPL-SCNC: 4.8 MMOL/L (ref 3.5–5.1)
PROT SERPL-MCNC: 8.5 G/DL (ref 6.4–8.2)
Q-T INTERVAL: 362 MS
QRS DURATION: 70 MS
QTC CALCULATION (BEZET): 438 MS
R AXIS: -52 DEGREES
RBC # BLD AUTO: 4.48 X10(6)UL
RSV RNA SPEC NAA+PROBE: POSITIVE
SARS-COV-2 RNA RESP QL NAA+PROBE: NOT DETECTED
SODIUM SERPL-SCNC: 137 MMOL/L (ref 136–145)
T AXIS: 68 DEGREES
TROPONIN I SERPL HS-MCNC: 22 NG/L
VENTRICULAR RATE: 88 BPM
WBC # BLD AUTO: 9 X10(3) UL (ref 4–11)

## 2024-03-26 PROCEDURE — 99285 EMERGENCY DEPT VISIT HI MDM: CPT

## 2024-03-26 PROCEDURE — 83605 ASSAY OF LACTIC ACID: CPT | Performed by: EMERGENCY MEDICINE

## 2024-03-26 PROCEDURE — 36415 COLL VENOUS BLD VENIPUNCTURE: CPT

## 2024-03-26 PROCEDURE — 94645 CONT INHLJ TX EACH ADDL HOUR: CPT

## 2024-03-26 PROCEDURE — 94799 UNLISTED PULMONARY SVC/PX: CPT

## 2024-03-26 PROCEDURE — 94668 MNPJ CHEST WALL SBSQ: CPT

## 2024-03-26 PROCEDURE — 94644 CONT INHLJ TX 1ST HOUR: CPT

## 2024-03-26 PROCEDURE — 80053 COMPREHEN METABOLIC PANEL: CPT | Performed by: EMERGENCY MEDICINE

## 2024-03-26 PROCEDURE — 85025 COMPLETE CBC W/AUTO DIFF WBC: CPT | Performed by: EMERGENCY MEDICINE

## 2024-03-26 PROCEDURE — 80053 COMPREHEN METABOLIC PANEL: CPT

## 2024-03-26 PROCEDURE — 0241U SARS-COV-2/FLU A AND B/RSV BY PCR (GENEXPERT): CPT | Performed by: EMERGENCY MEDICINE

## 2024-03-26 PROCEDURE — 94640 AIRWAY INHALATION TREATMENT: CPT

## 2024-03-26 PROCEDURE — 84484 ASSAY OF TROPONIN QUANT: CPT | Performed by: EMERGENCY MEDICINE

## 2024-03-26 PROCEDURE — 85025 COMPLETE CBC W/AUTO DIFF WBC: CPT

## 2024-03-26 PROCEDURE — 83880 ASSAY OF NATRIURETIC PEPTIDE: CPT | Performed by: EMERGENCY MEDICINE

## 2024-03-26 PROCEDURE — 71046 X-RAY EXAM CHEST 2 VIEWS: CPT | Performed by: EMERGENCY MEDICINE

## 2024-03-26 PROCEDURE — 93010 ELECTROCARDIOGRAM REPORT: CPT

## 2024-03-26 PROCEDURE — 94667 MNPJ CHEST WALL 1ST: CPT

## 2024-03-26 PROCEDURE — 96374 THER/PROPH/DIAG INJ IV PUSH: CPT

## 2024-03-26 PROCEDURE — 87040 BLOOD CULTURE FOR BACTERIA: CPT | Performed by: EMERGENCY MEDICINE

## 2024-03-26 PROCEDURE — 93005 ELECTROCARDIOGRAM TRACING: CPT

## 2024-03-26 RX ORDER — TAMSULOSIN HYDROCHLORIDE 0.4 MG/1
0.4 CAPSULE ORAL NIGHTLY
Status: DISCONTINUED | OUTPATIENT
Start: 2024-03-26 | End: 2024-04-06

## 2024-03-26 RX ORDER — METHYLPREDNISOLONE SODIUM SUCCINATE 125 MG/2ML
125 INJECTION, POWDER, LYOPHILIZED, FOR SOLUTION INTRAMUSCULAR; INTRAVENOUS ONCE
Status: COMPLETED | OUTPATIENT
Start: 2024-03-26 | End: 2024-03-26

## 2024-03-26 RX ORDER — FUROSEMIDE 10 MG/ML
40 INJECTION INTRAMUSCULAR; INTRAVENOUS
Status: DISCONTINUED | OUTPATIENT
Start: 2024-03-26 | End: 2024-03-27

## 2024-03-26 RX ORDER — ONDANSETRON 2 MG/ML
4 INJECTION INTRAMUSCULAR; INTRAVENOUS EVERY 6 HOURS PRN
Status: DISCONTINUED | OUTPATIENT
Start: 2024-03-26 | End: 2024-04-06

## 2024-03-26 RX ORDER — MELATONIN
3 NIGHTLY PRN
Status: DISCONTINUED | OUTPATIENT
Start: 2024-03-26 | End: 2024-04-06

## 2024-03-26 RX ORDER — IPRATROPIUM BROMIDE AND ALBUTEROL SULFATE 2.5; .5 MG/3ML; MG/3ML
3 SOLUTION RESPIRATORY (INHALATION) EVERY 6 HOURS
Status: DISCONTINUED | OUTPATIENT
Start: 2024-03-26 | End: 2024-03-29

## 2024-03-26 RX ORDER — LOSARTAN POTASSIUM 100 MG/1
100 TABLET ORAL DAILY
Status: DISCONTINUED | OUTPATIENT
Start: 2024-03-26 | End: 2024-03-30

## 2024-03-26 RX ORDER — LEVOTHYROXINE SODIUM 0.05 MG/1
50 TABLET ORAL DAILY
Status: DISCONTINUED | OUTPATIENT
Start: 2024-03-26 | End: 2024-04-06

## 2024-03-26 RX ORDER — FUROSEMIDE 40 MG/1
40 TABLET ORAL 2 TIMES DAILY
Status: ON HOLD | COMMUNITY
End: 2024-04-06

## 2024-03-26 RX ORDER — BENZONATATE 200 MG/1
200 CAPSULE ORAL 3 TIMES DAILY PRN
Status: DISCONTINUED | OUTPATIENT
Start: 2024-03-26 | End: 2024-04-06

## 2024-03-26 RX ORDER — ACETAMINOPHEN 500 MG
500 TABLET ORAL EVERY 4 HOURS PRN
Status: DISCONTINUED | OUTPATIENT
Start: 2024-03-26 | End: 2024-04-06

## 2024-03-26 RX ORDER — PANTOPRAZOLE SODIUM 40 MG/1
40 TABLET, DELAYED RELEASE ORAL
Status: DISCONTINUED | OUTPATIENT
Start: 2024-03-27 | End: 2024-03-30

## 2024-03-26 RX ORDER — IPRATROPIUM BROMIDE AND ALBUTEROL SULFATE 2.5; .5 MG/3ML; MG/3ML
3 SOLUTION RESPIRATORY (INHALATION) EVERY 4 HOURS PRN
Status: DISCONTINUED | OUTPATIENT
Start: 2024-03-26 | End: 2024-04-06

## 2024-03-26 RX ORDER — METHYLPREDNISOLONE SODIUM SUCCINATE 125 MG/2ML
80 INJECTION, POWDER, LYOPHILIZED, FOR SOLUTION INTRAMUSCULAR; INTRAVENOUS EVERY 8 HOURS
Status: DISCONTINUED | OUTPATIENT
Start: 2024-03-26 | End: 2024-03-27

## 2024-03-26 RX ORDER — TAMSULOSIN HYDROCHLORIDE 0.4 MG/1
0.4 CAPSULE ORAL DAILY
Status: ON HOLD | COMMUNITY
Start: 2023-06-05 | End: 2024-04-06

## 2024-03-26 RX ORDER — ATORVASTATIN CALCIUM 40 MG/1
40 TABLET, FILM COATED ORAL NIGHTLY
Status: DISCONTINUED | OUTPATIENT
Start: 2024-03-26 | End: 2024-04-06

## 2024-03-26 RX ORDER — GUAIFENESIN 600 MG/1
600 TABLET, EXTENDED RELEASE ORAL 2 TIMES DAILY
Status: DISCONTINUED | OUTPATIENT
Start: 2024-03-26 | End: 2024-04-02

## 2024-03-26 RX ORDER — ACETAMINOPHEN 325 MG/1
TABLET ORAL EVERY 6 HOURS PRN
Status: DISCONTINUED | OUTPATIENT
Start: 2024-03-26 | End: 2024-04-06

## 2024-03-26 RX ORDER — ACETAMINOPHEN 325 MG/1
TABLET ORAL EVERY 6 HOURS PRN
COMMUNITY

## 2024-03-26 RX ORDER — FLUTICASONE FUROATE AND VILANTEROL 200; 25 UG/1; UG/1
1 POWDER RESPIRATORY (INHALATION) DAILY
Status: DISCONTINUED | OUTPATIENT
Start: 2024-03-26 | End: 2024-04-06

## 2024-03-26 RX ORDER — PREDNISONE 20 MG/1
40 TABLET ORAL
Status: DISCONTINUED | OUTPATIENT
Start: 2024-03-26 | End: 2024-03-26

## 2024-03-26 RX ORDER — METOCLOPRAMIDE HYDROCHLORIDE 5 MG/ML
5 INJECTION INTRAMUSCULAR; INTRAVENOUS EVERY 8 HOURS PRN
Status: DISCONTINUED | OUTPATIENT
Start: 2024-03-26 | End: 2024-04-06

## 2024-03-26 RX ORDER — FUROSEMIDE 40 MG/1
40 TABLET ORAL 2 TIMES DAILY
Status: DISCONTINUED | OUTPATIENT
Start: 2024-03-26 | End: 2024-03-26

## 2024-03-26 NOTE — ED QUICK NOTES
Orders for admission, patient is aware of plan and ready to go upstairs. Any questions, please call ED RN Nighat at extension [phone].     Patient Covid vaccination status: Fully vaccinated     COVID Test Ordered in ED: SARS-CoV-2/Flu A and B/RSV by PCR (GeneXpert)    COVID Suspicion at Admission: N/A    Running Infusions:  None    Mental Status/LOC at time of transport: A&Ox4    Other pertinent information:   CIWA score: N/A   NIH score:  N/A

## 2024-03-26 NOTE — CONSULTS
[facility] PULMONARY/CRITICAL CARE CONSULTATION    HPI: Pt is a 89-year-old male who has a history of COPD, chronic R effusion with trapped lung that comes to the hospital with a runny nose, sneeze and cough with wheezing that is been present for about 4 days.  He has been using nebulizers at home and they feel the cough is worsening and his wheezing is worsening. He denies any headaches. No neck pain or stiffness. He denies any pain in his chest or abdomen. Denies any nausea, vomiting or diarrhea. Said no known fevers, chills or bodyaches.     Past Medical History:   Diagnosis Date    Abnormal CXR 3/30/2015    Arthritis of both knees 2/27/2015    Advanced, xrays 2013    Asthma (HCC)     Diabetes (HCC)     Diabetes mellitus (HCC) 2/27/2015    Disorder of thyroid     High blood pressure     High cholesterol     HTN (hypertension), benign 2/27/2015    Hydropneumothorax 2/17/2022    Hyperlipidemia 2/27/2015    Hypothyroidism due to acquired atrophy of thyroid 4/6/2015    Ischemic stroke (HCC)     LVH (left ventricular hypertrophy) 2/27/2015    Osteoarthritis     Shortness of breath     Solitary pulmonary nodule on lung CT 5/1/2015    A 3.5 mm nodular opacity in the caudal and posterior lateral corner of the posterior segment of the right upper lobe (image 113 of series 3) represents a nonspecific finding.     History reviewed. No pertinent surgical history.      Prior to Admission Medications   Prescriptions Last Dose Informant Patient Reported? Taking?   BREO ELLIPTA 200-25 MCG/INH Inhalation Aerosol Powder, Breath Activated 3/25/2024 at 0700  No Yes   Sig: Inhale 1 puff into the lungs daily. Use one puff daily.   Omeprazole 40 MG Oral Capsule Delayed Release 3/25/2024 at 0700  No Yes   Sig: Take 1 capsule (40 mg total) by mouth daily. Before meal   Potassium Chloride ER 20 MEQ Oral Tab CR 3/25/2024 at 0700  No Yes   Sig: TAKE 1 TABLET BY MOUTH DAILY WHEN USING LASIX   Patient taking differently: Take 1 tablet by mouth twice  a week. TAKE 1 TABLET BY MOUTH DAILY WHEN USING LASIX   acetaminophen 325 MG Oral Tab 3/25/2024  Yes Yes   Sig: Take 1-2 tablets (325-650 mg total) by mouth every 6 (six) hours as needed for Pain.   apixaban 2.5 MG Oral Tab 3/25/2024 at 1900  Yes Yes   Sig: Take 1 tablet (2.5 mg total) by mouth 2 (two) times daily.   atorvastatin 40 MG Oral Tab 3/25/2024 at 2000  No Yes   Sig: Take 1 tablet (40 mg total) by mouth nightly.   furosemide 40 MG Oral Tab 3/25/2024 at 1400  Yes Yes   Sig: Take 1 tablet (40 mg total) by mouth 2 (two) times daily.   levothyroxine 50 MCG Oral Tab 3/25/2024 at 0700  Yes Yes   Sig: Take 1 tablet (50 mcg total) by mouth daily.   losartan 100 MG Oral Tab 3/25/2024 at 0700  No Yes   Sig: Take 1 tablet (100 mg total) by mouth daily.   metoprolol tartrate 25 MG Oral Tab 3/25/2024 at 1900  Yes Yes   Sig: Take 0.5 tablets (12.5 mg total) by mouth 2x Daily(Beta Blocker).   tamsulosin 0.4 MG Oral Cap 3/25/2024 at 1900  Yes Yes   Sig: Take 1 capsule (0.4 mg total) by mouth daily.      Facility-Administered Medications: None         Current Meds:  No current facility-administered medications for this encounter.           Allergies:  No Known Allergies    Social History     Socioeconomic History    Marital status:      Spouse name: Not on file    Number of children: Not on file    Years of education: Not on file    Highest education level: Not on file   Occupational History    Not on file   Tobacco Use    Smoking status: Never     Passive exposure: Never    Smokeless tobacco: Never   Vaping Use    Vaping Use: Not on file   Substance and Sexual Activity    Alcohol use: No     Alcohol/week: 0.0 standard drinks of alcohol    Drug use: No    Sexual activity: Not on file   Other Topics Concern     Service Not Asked    Blood Transfusions Not Asked    Caffeine Concern Yes     Comment: 1 cup twice daily    Occupational Exposure Not Asked    Hobby Hazards Not Asked    Sleep Concern Not Asked     Stress Concern Not Asked    Weight Concern Not Asked    Special Diet Not Asked    Back Care Not Asked    Exercise Yes     Comment: at home PT    Bike Helmet Not Asked    Seat Belt Not Asked    Self-Exams Not Asked   Social History Narrative    Not on file     Social Determinants of Health     Financial Resource Strain: Not on file   Food Insecurity: Not on file   Transportation Needs: Not on file   Physical Activity: Not on file   Stress: Not on file   Social Connections: Not on file   Housing Stability: Not on file       No family history on file.    ROS: 10 pt ROS negative except what is mentioned in HPI    OBJECTIVE:  Vitals:    03/26/24 1112 03/26/24 1130 03/26/24 1200 03/26/24 1230   BP:  129/47 119/47 134/51   Pulse: 87 86 88 89   Resp: (!) 30 26 23 24   Temp:       TempSrc:       SpO2: 98% 100% 100% 96%   Weight:       Height:           Oxygen Therapy  SpO2: 96 %  O2 Device: None (Room air)    No intake/output data recorded.  No intake/output data recorded.    General appearance: alert, appears stated age, cooperative, and mild distress  Eyes: negative  Neck: no adenopathy and supple, symmetrical, trachea midline  Lungs: diminished breath sounds base - right and extensive b/l wheezes  Heart: regular rate and rhythm  Abdomen: soft, non-tender; bowel sounds normal; no masses,  no organomegaly  Extremities: extremities normal, atraumatic, no cyanosis or edema  Pulses: 2+ and symmetric  Neurologic: Grossly normal    Labs:  Recent Labs   Lab 03/26/24  0933   RBC 4.48   HGB 11.7*   HCT 37.5*   MCV 83.7   MCH 26.1   MCHC 31.2   RDW 14.3   NEPRELIM 6.67   WBC 9.0   .0     Recent Labs   Lab 03/26/24  0933   *   BUN 25*   CREATSERUM 1.82*   CA 10.0   ALB 3.4      K 4.8      CO2 29.0   ALKPHO 103   AST 18   ALT 18   BILT 0.7   TP 8.5*     No results for input(s): \"PCT\" in the last 168 hours.  No results for input(s): \"CRP\", \"DDIMER\", \"LDH\", \"KESHIA\", \"CK\" in the last 72 hours.  Lab Results    Component Value Date    COVID19 Not Detected 03/26/2024     No results for input(s): \"ABGPHT\", \"MOYNTN6N\", \"PPRKI8V\", \"ABGHCO3\", \"LM\", \"FIO2\" in the last 72 hours.  Recent Labs     03/26/24  1013   LACTI 1.5       No results found for: \"PT\", \"INR\"     No results for input(s): \"TROP\", \"CK\" in the last 168 hours.    Imaging -- reviewed and visualized  CXR -- chronic R effusion with pleural based nodule, no infiltrates.  Assessment and Plan:  Dyspnea -- secondary to COPD exac asso with RSV infection.  -O2 PRN  Acute Exac of COPD secondary to RSV  -no acute infiltrates on CT chest, monitor off Abx  -solumedrol  -Breo  -BD protocol  Pleural Effusion -- chronic R effusion  -tapped in past with trapped lung  -would not rec thora at this time  Abn CXR -- there is a R lateral pleural based nodular density on the CXR that does not appear to have been present in the past  -will need out patient follow up with Dr. Jones.  Dispo -- inpatient  -will follow    Fabian Fry M.D.  Pulmonary/Critical Care and Sleep Medicine

## 2024-03-26 NOTE — PROGRESS NOTES
NURSING ADMISSION NOTE      Patient admitted via Cart  Oriented to room.  Safety precautions initiated.  Bed in low position.  Call light in reach.    3/26 AM: Pt is A/O x 4, family at bedside. Rhonchi and exp wheezing noted, on room air, scheduled nebs, IV solu medrol. No BM today, voids in bathroom. Up 1 with walker. Takes pills whole with water. Advised to have patient sit up for meals and for drinking.

## 2024-03-26 NOTE — ED PROVIDER NOTES
Patient Seen in: [facility] Emergency Department      History     Chief Complaint   Patient presents with    Difficulty Breathing    Cough/URI     Stated Complaint: cold like symptoms. lisa    Subjective:   HPI    89-year-old male who has a history of COPD comes to the hospital with a runny nose, sneeze and cough with wheezing that is been present since the weekend.  They have been using nebulizers at home and they feel the cough is worsening and his wheezing is worsening.  He denies any headaches.  No neck pain or stiffness.  He denies any pain in his chest or abdomen.  Denies any nausea, vomiting or diarrhea.  Said no known fevers, chills or bodyaches.  He is denying any other complaints at this time.    Objective:   Past Medical History:   Diagnosis Date    Abnormal CXR 3/30/2015    Arthritis of both knees 2/27/2015    Advanced, xrays 2013    Asthma (HCC)     Diabetes (HCC)     Diabetes mellitus (HCC) 2/27/2015    Disorder of thyroid     High blood pressure     High cholesterol     HTN (hypertension), benign 2/27/2015    Hydropneumothorax 2/17/2022    Hyperlipidemia 2/27/2015    Hypothyroidism due to acquired atrophy of thyroid 4/6/2015    Ischemic stroke (HCC)     LVH (left ventricular hypertrophy) 2/27/2015    Osteoarthritis     Shortness of breath     Solitary pulmonary nodule on lung CT 5/1/2015    A 3.5 mm nodular opacity in the caudal and posterior lateral corner of the posterior segment of the right upper lobe (image 113 of series 3) represents a nonspecific finding.              History reviewed. No pertinent surgical history.             Social History     Socioeconomic History    Marital status:    Tobacco Use    Smoking status: Never     Passive exposure: Never    Smokeless tobacco: Never   Substance and Sexual Activity    Alcohol use: No     Alcohol/week: 0.0 standard drinks of alcohol    Drug use: No   Other Topics Concern    Caffeine Concern Yes     Comment: 1 cup twice daily     Exercise Yes     Comment: at home PT              Review of Systems    Positive for stated complaint: cold like symptoms. lisa  Other systems are as noted in HPI.  Constitutional and vital signs reviewed.      All other systems reviewed and negative except as noted above.    Physical Exam     ED Triage Vitals   BP 03/26/24 0917 (!) 188/69   Pulse 03/26/24 0917 91   Resp 03/26/24 1010 25   Temp 03/26/24 0917 99.3 °F (37.4 °C)   Temp src 03/26/24 0917 Temporal   SpO2 03/26/24 0917 93 %   O2 Device 03/26/24 0917 None (Room air)       Current:/47   Pulse 88   Temp 99.3 °F (37.4 °C) (Temporal)   Resp 23   Ht 170.2 cm (5' 7\")   Wt 62.1 kg   SpO2 100%   BMI 21.46 kg/m²         Physical Exam    HEENT : NCAT, EOMI, PEERL,  neck supple, no JVD, trachea midline, No LAD  Heart: S1S2 normal. No murmurs, regular rate and rhythm  Lungs: Bilateral wheezing with increased expiratory phase noted with cough with bilateral rhonchi  Abdomen: Soft nontender nondistended normal active bowel sounds without rebound, guarding or masses noted  Back nontender without CVA tenderness  Extremity no clubbing, cyanosis or edema noted.  Full range of motion noted without tenderness  Neuro: No focal deficits noted    All measures to prevent infection transmission during my interaction with the patient were taken.  The patient was already wearing droplet mask on my arrival to the room.  Personal protective equipment including a droplet mask as well as gloves were worn throughout the duration of my exam.  Hand washing was performed prior to and after the exam.  Stethoscope and equipment used during my examination was cleaned with a super Sani cloth germicidal wipe following the exam.    ED Course     Labs Reviewed   COMP METABOLIC PANEL (14) - Abnormal; Notable for the following components:       Result Value    Glucose 217 (*)     BUN 25 (*)     Creatinine 1.82 (*)     eGFR-Cr 35 (*)     Total Protein 8.5 (*)     Globulin  5.1 (*)     A/G  Ratio 0.7 (*)     All other components within normal limits   PRO BETA NATRIURETIC PEPTIDE - Abnormal; Notable for the following components:    Pro-Beta Natriuretic Peptide 3,090 (*)     All other components within normal limits   SARS-COV-2/FLU A AND B/RSV BY PCR (GENEXPERT) - Abnormal; Notable for the following components:    RSV by PCR Positive (*)     All other components within normal limits    Narrative:     This test is intended for the qualitative detection and differentiation of SARS-CoV-2, influenza A, influenza B, and respiratory syncytial virus (RSV) viral RNA in nasopharyngeal or nares swabs from individuals suspected of respiratory viral infection consistent with COVID-19 by their healthcare provider. Signs and symptoms of respiratory viral infection due to SARS-CoV-2, influenza, and RSV can be similar.    Test performed using the Xpert Xpress SARS-CoV-2/FLU/RSV (real time RT-PCR)  assay on the [employer]pert instrument, [place], [place] [address].   This test is being used under the Food and Drug Administration's Emergency Use Authorization.    The authorized Fact Sheet for Healthcare Providers for this assay is available upon request from the laboratory.   CBC W/ DIFFERENTIAL - Abnormal; Notable for the following components:    HGB 11.7 (*)     HCT 37.5 (*)     Lymphocyte Absolute 0.92 (*)     All other components within normal limits   LACTIC ACID, PLASMA - Normal   TROPONIN I HIGH SENSITIVITY - Normal   CBC WITH DIFFERENTIAL WITH PLATELET    Narrative:     The following orders were created for panel order CBC With Differential With Platelet.  Procedure                               Abnormality         Status                     ---------                               -----------         ------                     CBC W/ DIFFERENTIAL[[id number]]          Abnormal            Final result                 Please view results for these tests on the individual orders.   RAINBOW DRAW BLUE   RAINBOW DRAW GOLD    BLOOD CULTURE   BLOOD CULTURE     EKG    Rate, intervals and axes as noted on EKG Report.  Rate: 88  Rhythm: Junctional rhythm noted  Reading:QRS of 70 with junctional rhythm noted and left anterior fascicular block but no other acute ischemic change noted.              ED Course as of 03/26/24 1241  ------------------------------------------------------------  Time: 03/26 1208  Comment: While here the patient had 2 back-to-back hour-long albuterol treatments with Atrovent.  The patient was given Solu-Medrol IV as well.  The patient had a white count of 9000.  The troponin was 22.  Patient lactic acid level was negative.  He is positive for RSV.  His BNP was 3090.  His BUN/creatinine was 25 and 1.2 respectively.  The patient had a chest x-ray done that I first interpreted showing a right-sided effusion.  Read the radiology report as well.  The patient certainly had improvement with marked diminishment of the wheezing but fine crackles are still noted throughout.  At this time the patient mated to the hospitalist with pulmonology on consult.     XR CHEST PA + LAT CHEST (CPT=71046)    Result Date: 3/26/2024  PROCEDURE:  XR CHEST PA + LAT CHEST (CPT=71046)  INDICATIONS:  cold like symptoms. lisa  COMPARISON:  [facility] , XR, XR CHEST PA + LAT CHEST (CPT=71046), 2/21/2022, 10:13 AM.  [facility] , XR, XR CHEST AP PORTABLE  (CPT=71045), 2/22/2022, 9:16 AM.  [facility] , XR, XR CHEST AP PORTABLE  (CPT=71045), 2/22/2022, 7:13 PM.  TECHNIQUE:  PA and lateral chest radiographs were obtained.  PATIENT STATED HISTORY: (As transcribed by Technologist)  Coughing, shortness of breath for a few days. Patient's son says last night some crackling in the breathing started.    FINDINGS:  LUNGS:  Cardiomediastinal prominence.  Persistent moderate right pleural effusion, underlying atelectasis/consolidation cannot be excluded.  No pneumothorax.  There is a 1.8 cm nodule along the periphery of the right mid lung not appreciated on prior studies.   Hyperinflation of the lungs with flattening of the hemidiaphragms suggests COPD.            CONCLUSION:  1. Persistent moderate size right pleural effusion, underlying atelectasis/consolidation cannot be excluded. 2. Right mid lung 1.8 cm nodule along the periphery not appreciated on prior studies.  A CT of the chest can be performed for further evaluation as clinically indicated.   LOCATION:  [facility]   Dictated by (CST): Sophie Zamarripa MD on 3/26/2024 at 10:35 AM     Finalized by (CST): Sophie Zamarripa MD on 3/26/2024 at 10:38 AM        Medications   methylPREDNISolone sodium succinate (Solu-MEDROL) injection 125 mg (125 mg Intravenous Given 3/26/24 1009)   albuterol (Ventolin) (5 MG/ML) 0.5% nebulizer solution 10 mg (10 mg Nebulization Given 3/26/24 1010)   ipratropium (Atrovent) 0.02 % nebulizer solution 1.5 mg (1.5 mg Nebulization Given 3/26/24 1010)   albuterol (Ventolin) (5 MG/ML) 0.5% nebulizer solution 10 mg (10 mg Nebulization Given 3/26/24 1112)              MDM      Differential diagnosis includes COPD exacerbation, pneumonia, RSV, influenza and RSV but not limited such.  While here the patient was given 2 back-to-back hour-long respiratory treatments with certain improvement in lung sounds and he is breathing.  This time patient be admitted for further care at this time.      Critical Care Note:  The patient arrived with a condition with significant morbidity and mortality associated. The services I provided  were to promote improvement and reduce mortality specifically involving complex record review, complex medical decisions and interventions, and consultations outside the regular procedures and care normally rendered for 45 minutes of critical care time            This note was prepared using Dragon Medical voice recognition dictation software.  As a result errors may occur.  When identified to these areas have been corrected.  While every attempt is made to correct errors during dictation discrepancies may  still exist.  Please contact if there are any errors.  Admission disposition: 3/26/2024 12:41 PM                                        Medical Decision Making      Disposition and Plan     Clinical Impression:  1. COPD exacerbation (HCC)    2. Pleural effusion on right    3. Acute bronchiolitis due to respiratory syncytial virus (RSV)         Disposition:  Admit  3/26/2024 12:41 pm    Follow-up:  No follow-up provider specified.        Medications Prescribed:  Current Discharge Medication List                            Hospital Problems       Present on Admission  Date Reviewed: 5/23/2022            ICD-10-CM Noted POA    * (Principal) COPD exacerbation (HCC) J44.1 3/26/2024 Unknown

## 2024-03-26 NOTE — ED INITIAL ASSESSMENT (HPI)
Patient started with congestion Friday night, worsened over the weekend with wheezing- robitussin 3x/day, 2 puffs of albuterol 3x/day, and mucinex. Patient started neb tx yesterday- 2-3x/day. Patient continues to have SOB, wheezing, coughing, difficulty ambulating far. No known fevers. Sore throat from coughing. Patient coughing in triage, difficulty talking- increased SOB.

## 2024-03-26 NOTE — H&P
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  Symptoms have been going on for the last 3 to 4 days.  Also reporting runny nose, sneezing and coughing with wheezing.  Denies any productive cough, minimal sputum which is white.  Tried using nebulizers at home with no improvement.  Tried taking Robitussin with no improvement and rescue nebulizers.  Subsequently came to the hospital after no improvement.  In the ER he had a temp of 99.3, saturating 93% on room air and hypertensive.  Labs significant for proBNP of 3000, creatinine of 1.8 and tested positive for RSV.  Chest x-ray with persistent moderate right-sided pleural effusion.  Diagnosed with COPD exacerbation and started on IV steroids and nebulizers.  Pulmonology consulted and admitted for further evaluation and treatment.    Past Medical History:   Diagnosis Date    Abnormal CXR 3/30/2015    Arthritis of both knees 2/27/2015    Advanced, xrays 2013    Asthma (HCC)     Diabetes (HCC)     Diabetes mellitus (HCC) 2/27/2015    Disorder of thyroid     High blood pressure     High cholesterol     HTN (hypertension), benign 2/27/2015    Hydropneumothorax 2/17/2022    Hyperlipidemia 2/27/2015    Hypothyroidism due to acquired atrophy of thyroid 4/6/2015    Ischemic stroke (HCC)     LVH (left ventricular hypertrophy) 2/27/2015    Osteoarthritis     Shortness of breath     Solitary pulmonary nodule on lung CT 5/1/2015    A 3.5 mm nodular opacity in the caudal and posterior lateral corner of the posterior segment of the right upper lobe (image 113 of series 3)  represents a nonspecific finding.      History reviewed. No pertinent surgical history.     ALL:  No Known Allergies     No current outpatient medications on file.       Social History     Tobacco Use    Smoking status: Never     Passive exposure: Never    Smokeless tobacco: Never   Substance Use Topics    Alcohol use: No     Alcohol/week: 0.0 standard drinks of alcohol        Fam Hx  No family history on file.    Review of Systems  Comprehensive ROS reviewed and negative except for what is stated in HPI.      OBJECTIVE:  /51   Pulse 89   Temp 99.3 °F (37.4 °C) (Temporal)   Resp 24   Ht 5' 7\" (1.702 m)   Wt 137 lb (62.1 kg)   SpO2 96%   BMI 21.46 kg/m²   General:  Alert, no distress, appears stated age.    Head:  Normocephalic, without obvious abnormality, atraumatic.   Eyes:  Sclera anicteric, No conjunctival pallor, EOMs intact.    Nose: Nares normal. Septum midline. Mucosa normal. No drainage.   Throat: Lips, mucosa, and tongue normal. Teeth and gums normal.   Neck: Supple, symmetrical, trachea midline, no cervical or supraclavicular lymph adenopathy, thyroid: no enlargment/tenderness/nodules appreciated   Lungs:   Expiratory wheezing/rhonchi bilaterally. Normal effort   Chest wall:  No tenderness or deformity.   Heart:  Regular rate and rhythm, S1, S2 normal, no murmur, rub or gallop appreciated   Abdomen:   Soft, non-tender. Bowel sounds normal. No masses,  No organomegaly. Non distended   Extremities: Extremities normal, atraumatic, no cyanosis, 1+ BLE edema.   Skin: Skin color, texture, turgor normal. No rashes or lesions.    Neurologic: Normal strength, no focal deficit appreciated     Data Review:    LABS:   Lab Results   Component Value Date    WBC 9.0 03/26/2024    HGB 11.7 03/26/2024    HCT 37.5 03/26/2024    .0 03/26/2024    CREATSERUM 1.82 03/26/2024    BUN 25 03/26/2024     03/26/2024    K 4.8 03/26/2024     03/26/2024    CO2 29.0 03/26/2024     03/26/2024    CA  10.0 03/26/2024    ALB 3.4 03/26/2024    ALKPHO 103 03/26/2024    BILT 0.7 03/26/2024    TP 8.5 03/26/2024    AST 18 03/26/2024    ALT 18 03/26/2024       CXR: All imaging personally reviewed.      Radiology: XR CHEST PA + LAT CHEST (CPT=71046)    Result Date: 3/26/2024  PROCEDURE:  XR CHEST PA + LAT CHEST (CPT=71046)  INDICATIONS:  cold like symptoms. lisa  COMPARISON:  [facility] , XR, XR CHEST PA + LAT CHEST (CPT=71046), 2/21/2022, 10:13 AM.  [facility] , XR, XR CHEST AP PORTABLE  (CPT=71045), 2/22/2022, 9:16 AM.  [facility] , XR, XR CHEST AP PORTABLE  (CPT=71045), 2/22/2022, 7:13 PM.  TECHNIQUE:  PA and lateral chest radiographs were obtained.  PATIENT STATED HISTORY: (As transcribed by Technologist)  Coughing, shortness of breath for a few days. Patient's son says last night some crackling in the breathing started.    FINDINGS:  LUNGS:  Cardiomediastinal prominence.  Persistent moderate right pleural effusion, underlying atelectasis/consolidation cannot be excluded.  No pneumothorax.  There is a 1.8 cm nodule along the periphery of the right mid lung not appreciated on prior studies.  Hyperinflation of the lungs with flattening of the hemidiaphragms suggests COPD.            CONCLUSION:  1. Persistent moderate size right pleural effusion, underlying atelectasis/consolidation cannot be excluded. 2. Right mid lung 1.8 cm nodule along the periphery not appreciated on prior studies.  A CT of the chest can be performed for further evaluation as clinically indicated.   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   [facility] hospitalist to continue to follow patient while in house      Chanel Seaman MD  [facility]  Hospitalist  Message over [email or web address]  Pager: [phone]

## 2024-03-27 LAB
ALBUMIN SERPL-MCNC: 2.8 G/DL (ref 3.4–5)
ALBUMIN/GLOB SERPL: 0.6 {RATIO} (ref 1–2)
ALP LIVER SERPL-CCNC: 84 U/L
ALT SERPL-CCNC: 14 U/L
ANION GAP SERPL CALC-SCNC: 6 MMOL/L (ref 0–18)
AST SERPL-CCNC: 14 U/L (ref 15–37)
BASOPHILS # BLD AUTO: 0.01 X10(3) UL (ref 0–0.2)
BASOPHILS NFR BLD AUTO: 0.1 %
BILIRUB SERPL-MCNC: 0.5 MG/DL (ref 0.1–2)
BUN BLD-MCNC: 42 MG/DL (ref 9–23)
CALCIUM BLD-MCNC: 9.7 MG/DL (ref 8.5–10.1)
CHLORIDE SERPL-SCNC: 103 MMOL/L (ref 98–112)
CO2 SERPL-SCNC: 26 MMOL/L (ref 21–32)
CREAT BLD-MCNC: 1.95 MG/DL
DEPRECATED HBV CORE AB SER IA-ACNC: 80.9 NG/ML
EGFRCR SERPLBLD CKD-EPI 2021: 32 ML/MIN/1.73M2 (ref 60–?)
EOSINOPHIL # BLD AUTO: 0 X10(3) UL (ref 0–0.7)
EOSINOPHIL NFR BLD AUTO: 0 %
ERYTHROCYTE [DISTWIDTH] IN BLOOD BY AUTOMATED COUNT: 14.3 %
GLOBULIN PLAS-MCNC: 4.5 G/DL (ref 2.8–4.4)
GLUCOSE BLD-MCNC: 316 MG/DL (ref 70–99)
HCT VFR BLD AUTO: 30.6 %
HGB BLD-MCNC: 10.2 G/DL
IMM GRANULOCYTES # BLD AUTO: 0.22 X10(3) UL (ref 0–1)
IMM GRANULOCYTES NFR BLD: 1.4 %
IRON SATN MFR SERPL: 5 %
IRON SERPL-MCNC: 15 UG/DL
LYMPHOCYTES # BLD AUTO: 0.45 X10(3) UL (ref 1–4)
LYMPHOCYTES NFR BLD AUTO: 2.8 %
MAGNESIUM SERPL-MCNC: 2.2 MG/DL (ref 1.6–2.6)
MCH RBC QN AUTO: 26.2 PG (ref 26–34)
MCHC RBC AUTO-ENTMCNC: 33.3 G/DL (ref 31–37)
MCV RBC AUTO: 78.5 FL
MONOCYTES # BLD AUTO: 0.57 X10(3) UL (ref 0.1–1)
MONOCYTES NFR BLD AUTO: 3.5 %
NEUTROPHILS # BLD AUTO: 14.85 X10 (3) UL (ref 1.5–7.7)
NEUTROPHILS # BLD AUTO: 14.85 X10(3) UL (ref 1.5–7.7)
NEUTROPHILS NFR BLD AUTO: 92.2 %
OSMOLALITY SERPL CALC.SUM OF ELEC: 303 MOSM/KG (ref 275–295)
PLATELET # BLD AUTO: 304 10(3)UL (ref 150–450)
POTASSIUM SERPL-SCNC: 4.4 MMOL/L (ref 3.5–5.1)
PROT SERPL-MCNC: 7.3 G/DL (ref 6.4–8.2)
RBC # BLD AUTO: 3.9 X10(6)UL
SODIUM SERPL-SCNC: 135 MMOL/L (ref 136–145)
TIBC SERPL-MCNC: 325 UG/DL (ref 240–450)
TRANSFERRIN SERPL-MCNC: 218 MG/DL (ref 200–360)
WBC # BLD AUTO: 16.1 X10(3) UL (ref 4–11)

## 2024-03-27 PROCEDURE — 94640 AIRWAY INHALATION TREATMENT: CPT

## 2024-03-27 PROCEDURE — 97530 THERAPEUTIC ACTIVITIES: CPT

## 2024-03-27 PROCEDURE — 80053 COMPREHEN METABOLIC PANEL: CPT | Performed by: HOSPITALIST

## 2024-03-27 PROCEDURE — 97166 OT EVAL MOD COMPLEX 45 MIN: CPT

## 2024-03-27 PROCEDURE — 83540 ASSAY OF IRON: CPT | Performed by: HOSPITALIST

## 2024-03-27 PROCEDURE — 83550 IRON BINDING TEST: CPT | Performed by: HOSPITALIST

## 2024-03-27 PROCEDURE — 83735 ASSAY OF MAGNESIUM: CPT | Performed by: HOSPITALIST

## 2024-03-27 PROCEDURE — 82728 ASSAY OF FERRITIN: CPT | Performed by: HOSPITALIST

## 2024-03-27 PROCEDURE — 97116 GAIT TRAINING THERAPY: CPT

## 2024-03-27 PROCEDURE — 97161 PT EVAL LOW COMPLEX 20 MIN: CPT

## 2024-03-27 PROCEDURE — 92610 EVALUATE SWALLOWING FUNCTION: CPT

## 2024-03-27 PROCEDURE — 85025 COMPLETE CBC W/AUTO DIFF WBC: CPT | Performed by: HOSPITALIST

## 2024-03-27 RX ORDER — FUROSEMIDE 40 MG/1
40 TABLET ORAL
Status: DISCONTINUED | OUTPATIENT
Start: 2024-03-28 | End: 2024-03-30

## 2024-03-27 RX ORDER — MAGNESIUM HYDROXIDE/ALUMINUM HYDROXICE/SIMETHICONE 120; 1200; 1200 MG/30ML; MG/30ML; MG/30ML
30 SUSPENSION ORAL 4 TIMES DAILY PRN
Status: DISCONTINUED | OUTPATIENT
Start: 2024-03-27 | End: 2024-04-06

## 2024-03-27 RX ORDER — PREDNISONE 20 MG/1
40 TABLET ORAL
Status: DISCONTINUED | OUTPATIENT
Start: 2024-03-27 | End: 2024-04-02 | Stop reason: ALTCHOICE

## 2024-03-27 NOTE — PLAN OF CARE
Pt Aox4. Spo2 >90% on RA. IV solumedrol, nebs. Afib on tele, IV lasix. Up x1 walker to restroom. SLP to eval, trouble getting pills down. IV SL. Denies pain, n/v/d/c. Pt updated on POC. Call light within reach, safety precautions in place. No further pt needs at this time.     Problem: RESPIRATORY - ADULT  Goal: Achieves optimal ventilation and oxygenation  Description: INTERVENTIONS:  - Assess for changes in respiratory status  - Assess for changes in mentation and behavior  - Position to facilitate oxygenation and minimize respiratory effort  - Oxygen supplementation based on oxygen saturation or ABGs  - Provide Smoking Cessation handout, if applicable  - Encourage broncho-pulmonary hygiene including cough, deep breathe, Incentive Spirometry  - Assess the need for suctioning and perform as needed  - Assess and instruct to report SOB or any respiratory difficulty  - Respiratory Therapy support as indicated  - Manage/alleviate anxiety  - Monitor for signs/symptoms of CO2 retention  Outcome: Progressing

## 2024-03-27 NOTE — PROGRESS NOTES
Pt A/O x4. RA Spo2>90, nebs, afib on tele, IV lasix, up x1 walker to bathroom nectar thick/ puree diet. Pt updated on POC, call light within reach, family at bedside, safety precautions in place. No further needs at this time.

## 2024-03-27 NOTE — PROGRESS NOTES
[facility] PULMONARY/CRITICAL CARE    S: patient aspirated on cough drop this AM.    Meds:   [START ON 3/28/2024] furosemide  40 mg Oral BID (Diuretic)    atorvastatin  40 mg Oral Nightly    fluticasone furoate-vilanterol  1 puff Inhalation Daily    levothyroxine  50 mcg Oral Daily    losartan  100 mg Oral Daily    metoprolol tartrate  12.5 mg Oral 2x Daily(Beta Blocker)    pantoprazole  40 mg Oral QAM AC    ipratropium-albuterol  3 mL Nebulization Q6H    guaiFENesin ER  600 mg Oral BID    methylPREDNISolone  80 mg Intravenous Q8H    apixaban  2.5 mg Oral BID    tamsulosin  0.4 mg Oral Nightly       Prn Meds:  acetaminophen, melatonin, ondansetron, metoclopramide, benzonatate, ipratropium-albuterol, benzocaine-menthol, acetaminophen, benzocaine-menthol    Infusions:      OBJECTIVE:  Vitals:    03/26/24 2116 03/26/24 2300 03/27/24 0439 03/27/24 0556   BP: 150/47 140/61 135/62    Pulse: 87 82 87 86   Resp: 18 18 (!) 2 20   Temp: 97.5 °F (36.4 °C) 98.5 °F (36.9 °C) 98.3 °F (36.8 °C)    TempSrc: Oral Oral Oral    SpO2: 98% 97% 95%    Weight:       Height:           Oxygen Therapy  SpO2: 95 %  O2 Device: None (Room air)  O2 Flow Rate (L/min): 0 L/min  Pulse Oximetry Type: Continuous  Oximetry Probe Site Changed: No  Pulse Ox Probe Location: Right hand           No intake/output data recorded.  No intake/output data recorded.    Lungs: rhonchi with improved wheezes  Heart: regular rate and rhythm  Abdomen: soft, non-tender; bowel sounds normal; no masses,  no organomegaly  Extremities: extremities normal, atraumatic, no cyanosis or edema    Labs:  Recent Labs   Lab 03/26/24  0933 03/27/24  0656   RBC 4.48 3.90   HGB 11.7* 10.2*   HCT 37.5* 30.6*   MCV 83.7 78.5*   MCH 26.1 26.2   MCHC 31.2 33.3   RDW 14.3 14.3   NEPRELIM 6.67 14.85*   WBC 9.0 16.1*   .0 304.0     Recent Labs   Lab 03/26/24  0933 03/27/24  0656   * 316*   BUN 25* 42*   CREATSERUM 1.82* 1.95*   CA 10.0 9.7   ALB 3.4 2.8*    135*   K 4.8 4.4     103   CO2 29.0 26.0   ALKPHO 103 84   AST 18 14*   ALT 18 14*   BILT 0.7 0.5   TP 8.5* 7.3     No results for input(s): \"PCT\" in the last 168 hours.  No results for input(s): \"CRP\", \"DDIMER\", \"LDH\", \"KESHIA\", \"CK\" in the last 72 hours.  Lab Results   Component Value Date    COVID19 Not Detected 03/26/2024     No results for input(s): \"ABGPHT\", \"TZGXXT9Z\", \"QCRNZ5R\", \"ABGHCO3\", \"LM\", \"FIO2\" in the last 72 hours.  Recent Labs     03/26/24  1013   LACTI 1.5       No results found for: \"PT\", \"INR\"     No results for input(s): \"TROP\", \"CK\" in the last 168 hours.    Imaging -- reviewed and visualized  Assessment and Plan    Dyspnea -- secondary to COPD exac asso with RSV infection.  -O2 PRN  Acute Exac of COPD secondary to RSV  -no acute infiltrates on CT chest, monitor off Abx  -change solumedrol to PO prednisone given improvement in wheezes.  -Breo  -BD protocol  Pleural Effusion -- chronic R effusion  -tapped in past with trapped lung  -would not rec thora at this time  Abn CXR -- there is a R lateral pleural based nodular density on the CXR that does not appear to have been present in the past  -will need out patient follow up with Dr. Jones.  Dispo -- inpatient  -will follow    Fabian Fry M.D.  Pulmonary/Critical Care and Sleep Medicine

## 2024-03-27 NOTE — SLP NOTE
ADULT SWALLOWING EVALUATION    ASSESSMENT    ASSESSMENT/OVERALL IMPRESSION:  Patient is a 89 y/o male admitted with cough and difficulty breathing; tested positive for RSV. PMHx significant for COPD, afib, HTN, CKD, HLD, DM-2, and prior CVA. SLP order received to evaluate oropharyngeal swallow d/t concern for aspiration. Patient received alert in bed with family present at bedside. Patient endorsed incident of coughing/choking while attempting to drink yesterday evening. He denied s/s of aspiration PTA. Patient with limited dentition and chooses softer items to eat at baseline.     Patient presented with oropharyngeal dysphagia characterized by impaired mastication and suspected premature bolus loss vs delayed pharyngeal swallow initiation. Immediate cough observed following thin liquid trials x3. No overt s/s of aspiration observed with mildly thick liquids.    Recommend patient initiate a soft & bite-sized diet with mildly thick liquids via single cup sips. Patient requested medications be crushed and administered in a pureed bolus if able. SLP will continue to follow to monitor diet tolerance and adjust as appropriate. Instrumental exam to be completed should symptoms persist. Education provided re: results and recommendations.         RECOMMENDATIONS   Diet Recommendations - Solids: Mechanical soft chopped/ Soft & Bite Sized  Diet Recommendations - Liquids: Nectar thick liquids/ Mildly thick                        Compensatory Strategies Recommended: Small bites and sips  Aspiration Precautions: Upright position  Medication Administration Recommendations: Crushed in puree (if able)  Treatment Plan/Recommendations: Aspiration precautions    HISTORY   MEDICAL HISTORY  Reason for Referral: R/O aspiration    Problem List  Principal Problem:    COPD exacerbation (HCC)  Active Problems:    Pleural effusion on right    Acute bronchiolitis due to respiratory syncytial virus (RSV)      Past Medical History  Past Medical  History:   Diagnosis Date    Abnormal CXR 3/30/2015    Arthritis of both knees 2/27/2015    Advanced, xrays 2013    Asthma (HCC)     Diabetes (HCC)     Diabetes mellitus (HCC) 2/27/2015    Disorder of thyroid     High blood pressure     High cholesterol     HTN (hypertension), benign 2/27/2015    Hydropneumothorax 2/17/2022    Hyperlipidemia 2/27/2015    Hypothyroidism due to acquired atrophy of thyroid 4/6/2015    Ischemic stroke (HCC)     LVH (left ventricular hypertrophy) 2/27/2015    Osteoarthritis     Shortness of breath     Solitary pulmonary nodule on lung CT 5/1/2015    A 3.5 mm nodular opacity in the caudal and posterior lateral corner of the posterior segment of the right upper lobe (image 113 of series 3) represents a nonspecific finding.          Diet Prior to Admission: Regular;Thin liquids  Precautions: Aspiration    Patient/Family Goals: none stated    SWALLOWING HISTORY  Current Diet Consistency: NPO  Dysphagia History:   VFSS 2/24/22  Overall Impression: Mild to Mild/Moderate Oropharyngeal Dysphagia 2/2 decreased labial strength on left, decreased lingual control/coordination, delayed initiation of pharyngeal swallow response, and intermittent flash, transient laryngeal penetration with thin liquid trials which cleared with sensory response throat clearing and/or force of swallow. Noted mild/moderate vallecular and pyriform sinus retention of puree and solid texture however suspected it was partially related to presence of NGT.  No evidence of tracheal aspiration observed within scope of this study.     Imaging Results:   CXR 3/26/24  CONCLUSION:    1. Persistent moderate size right pleural effusion, underlying atelectasis/consolidation cannot be excluded.   2. Right mid lung 1.8 cm nodule along the periphery not appreciated on prior studies.  A CT of the chest can be performed for further evaluation as clinically indicated.         LOCATION:  [facility]         Dictated by (CST): Sophie Zamarripa MD on  3/26/2024 at 10:35 AM       Finalized by (CST): Sophie Zamarripa MD on 3/26/2024 at 10:38 AM     SUBJECTIVE       OBJECTIVE   ORAL MOTOR EXAMINATION  Dentition: Functional  Symmetry: Within Functional Limits  Strength: Within Functional Limits     Range of Motion: Within Functional Limits          Respiratory Status: Unlabored  Consistencies Trialed: Thin liquids;Nectar thick liquids;Puree;Soft solid  Method of Presentation: Self presentation  Patient Positioning: Upright;Midline    Oral Phase of Swallow: Impaired              Mastication: Impaired       Pharyngeal Phase of Swallow: Impaired  Laryngeal Elevation Timing: Appears impaired        (Please note: Silent aspiration cannot be evaluated clinically. Videofluoroscopic Swallow Study is required to rule-out silent aspiration.)    Esophageal Phase of Swallow: No complaints consistent with possible esophageal involvement  Comments: d/w RN              GOALS  Goal #1 The patient will tolerate soft & bite-sized consistency and mildly thick liquids without overt signs or symptoms of aspiration with 95 % accuracy over 1-2 session(s).  In Progress   Goal #2 The patient/family/caregiver will demonstrate understanding and implementation of aspiration precautions and swallow strategies independently over 1-2 session(s).    In Progress   Goal #3 VFSS if clinically indicated.  In Progress   Goal #4     Goal #5     Goal #6     Goal #7     Goal #8       FOLLOW UP  Treatment Plan/Recommendations: Aspiration precautions     Follow Up Needed (Documentation Required): Yes  SLP Follow-up Date: 03/28/24    Thank you for your referral.   If you have any questions, please contact JULIETA Baxter

## 2024-03-27 NOTE — PHYSICAL THERAPY NOTE
PHYSICAL THERAPY EVALUATION - INPATIENT     Room Number: 531/531-A  Evaluation Date: 3/27/2024  Type of Evaluation: Initial  Physician Order: PT Eval and Treat    Presenting Problem: COPD exacerbation/RSV  Co-Morbidities : DM, A-fib, hx CVA  Reason for Therapy: Mobility Dysfunction and Discharge Planning    PHYSICAL THERAPY ASSESSMENT   Patient is currently functioning below baseline with bed mobility, transfers, gait, stair negotiation, and standing prolonged periods.  Prior to admission, patient's baseline is mod I with use of r/w.  Patient is requiring contact guard assist as a result of the following impairments: decreased endurance/aerobic capacity, impaired dynamic standing balance, and decreased muscular endurance.  Physical Therapy will continue to follow for duration of hospitalization.    Patient will benefit from continued skilled PT Services at discharge to promote functional independence and safety with additional support and return home with home health PT.    PLAN  PT Treatment Plan: Bed mobility;Endurance;Energy conservation;Patient education;Gait training;Strengthening;Stair training;Transfer training;Balance training  Rehab Potential : Good  Frequency (Obs): 3-5x/week  Number of Visits to Meet Established Goals: 4      CURRENT GOALS    Goal #1 Patient is able to demonstrate supine - sit EOB @ level: modified independent     Goal #2 Patient is able to demonstrate transfers EOB to/from Chair/Wheelchair at assistance level: modified independent     Goal #3 Patient is able to ambulate 50 feet with assist device: walker - rolling at assistance level: supervision     Goal #4 Pt will negotiate one flight of stairs with railing with cga   Goal #5    Goal #6    Goal Comments: Goals established on 3/27/2024      PHYSICAL THERAPY MEDICAL/SOCIAL HISTORY  History related to current admission: Patient is a 89 year old male admitted on 3/26/2024 from home for inc shortness of breath..  Pt diagnosed with  COPD exacerbation/RSV.      HOME SITUATION  Type of Home: House   Home Layout: Two level (stays on upper level)  Stairs to Enter : 2     Stairs to Bedroom: 12  Railing: Yes    Lives With: Son (Son's family)  Drives: No  Patient Owned Equipment: Rolling walker  Patient Regularly Uses: None    Prior Level of [place]: Pt typically ind with use of r/w in home. Pt stays on upper level of home. Dtr present and reports pt only comes downstairs approx 1 x month when company is visiting. Family assists pt with stairs when needed. Pt denies falls in last 6 months.    SUBJECTIVE  Pt pleasant and cooperative      OBJECTIVE     Fall Risk: High fall risk    WEIGHT BEARING RESTRICTION  Weight Bearing Restriction: None                PAIN ASSESSMENT  Ratin  Location: L knee  Management Techniques: Activity promotion;Body mechanics;Repositioning    COGNITION  Overall Cognitive Status:  WFL - within functional limits    RANGE OF MOTION AND STRENGTH ASSESSMENT  Upper extremity ROM and strength are within functional limits     Lower extremity ROM is within functional limits     Lower extremity strength is within functional limits       BALANCE  Static Sitting: Good  Dynamic Sitting: Not tested  Static Standing: Fair -  Dynamic Standing: Fair -    ADDITIONAL TESTS                                    ACTIVITY TOLERANCE  Pulse: 84  Heart Rate Source: Monitor                   O2 WALK  Oxygen Therapy  SPO2% on Room Air at Rest: 94  At rest oxygen flow (liters per minute): 92    NEUROLOGICAL FINDINGS                        AM-PAC '6-Clicks' INPATIENT SHORT FORM - BASIC MOBILITY  How much difficulty does the patient currently have...  Patient Difficulty: Turning over in bed (including adjusting bedclothes, sheets and blankets)?: None   Patient Difficulty: Sitting down on and standing up from a chair with arms (e.g., wheelchair, bedside commode, etc.): None   Patient Difficulty: Moving from lying on back to sitting on the side of the  bed?: A Little   How much help from another person does the patient currently need...   Help from Another: Moving to and from a bed to a chair (including a wheelchair)?: A Little   Help from Another: Need to walk in hospital room?: A Little   Help from Another: Climbing 3-5 steps with a railing?: A Lot       AM-PAC Score:  Raw Score: 19   Approx Degree of Impairment: 41.77%   Standardized Score (AM-PAC Scale): 45.44   CMS Modifier (G-Code): CK    FUNCTIONAL ABILITY STATUS  Gait Assessment   Functional Mobility/Gait Assessment  Gait Assistance: Contact guard assist  Distance (ft): 10  Assistive Device: Rolling walker  Pattern: Shuffle    Skilled Therapy Provided     Bed Mobility:  Rolling: NT  Supine to sit: NT   Sit to supine: NT     Transfer Mobility:  Sit to stand: sba   Stand to sit: sba  Gait = cga with cueing for pacing and walker mgmt    Therapist's Comments: Pt presents seated in chair, dtr present. Pt agreeable to PT eval. RN approval noted. Pt initially with increased SOB with standing. Pt seated for recovery period. Pt educated on posture, shoulder rolls, scapular retraction. Pt stands with cga and then able to ambulate 10 ft in room with cga, maintaingO2 sats >90%. Pt with slow kevin for energy conservation. Pt seated with sba. Pt educated to be up to chair for meals and complete short distance ambulation with staff and r/w 2-3 x day. Pt verbalizes understanding.    Exercise/Education Provided:  Functional activity tolerated  Gait training  Posture  Upper therapeutic exercise:  Scapular Retraction and shoulder rolls  Transfer training    Patient End of Session: Up in chair;Needs met;Call light within reach;RN aware of session/findings;All patient questions and concerns addressed;Alarm set      Patient Evaluation Complexity Level:  History Low - no personal factors and/or co-morbidities   Examination of body systems Low - addressing 1-2 elements   Clinical Presentation Low - Stable   Clinical Decision  Making Low - Stable       PT Session Time: 25 minutes  Gait Training: 10 minutes    Therapeutic Exercise: 5 minutes

## 2024-03-27 NOTE — PROGRESS NOTES
[facility]  Hospitalist Progress Note                                                                     [facility]   part of [facility] [place]  12/8/1932    SUBJECTIVE:  Patient seen and examined.  Aspiration event last night, now on restricted diet.   Feeling better from yesterday. +PETERSEN  Productive cough with increasing sputum production.  Denies CP.  Still with audible rhonchi.  NAD.   Daughter bedside.       OBJECTIVE:  Temp:  [97.5 °F (36.4 °C)-98.5 °F (36.9 °C)] 98.3 °F (36.8 °C)  Pulse:  [82-90] 86  Resp:  [2-30] 20  BP: (119-150)/(47-62) 135/62  SpO2:  [95 %-100 %] 95 %  Exam  Gen: No acute distress, alert and oriented x3, no focal neurologic deficits  Pulm: Lungs clear bilaterally, normal respiratory effort  CV: Heart with regular rate and rhythm, no murmur.  Normal PMI.    Abd: Abdomen soft, nontender, nondistended, no organomegaly, bowel sounds present  MSK: Full range of motion in extremities, no clubbing, no cyanosis  Skin: no rashes or lesions    Labs:   Recent Labs   Lab 03/26/24  0933 03/27/24  0656   WBC 9.0 16.1*   HGB 11.7* 10.2*   MCV 83.7 78.5*   .0 304.0       Recent Labs   Lab 03/26/24  0933 03/27/24  0656    135*   K 4.8 4.4    103   CO2 29.0 26.0   BUN 25* 42*   CREATSERUM 1.82* 1.95*   CA 10.0 9.7   MG  --  2.2   * 316*       Recent Labs   Lab 03/26/24  0933 03/27/24  0656   ALT 18 14*   AST 18 14*   ALB 3.4 2.8*       No results for input(s): \"PGLU\" in the last 168 hours.    Meds:   Scheduled:    [START ON 3/28/2024] furosemide  40 mg Oral BID (Diuretic)    atorvastatin  40 mg Oral Nightly    fluticasone furoate-vilanterol  1 puff Inhalation Daily    levothyroxine  50 mcg Oral Daily    losartan  100 mg Oral Daily    metoprolol tartrate  12.5 mg Oral 2x Daily(Beta Blocker)    pantoprazole  40 mg Oral QAM AC    ipratropium-albuterol  3 mL Nebulization Q6H    guaiFENesin ER  600  mg Oral BID    methylPREDNISolone  80 mg Intravenous Q8H    apixaban  2.5 mg Oral BID    tamsulosin  0.4 mg Oral Nightly     Continuous Infusions:   PRN: acetaminophen, melatonin, ondansetron, metoclopramide, benzonatate, ipratropium-albuterol, benzocaine-menthol, acetaminophen, benzocaine-menthol    Assessment/Plan:  Principal Problem:    COPD exacerbation (HCC)  Active Problems:    Pleural effusion on right    Acute bronchiolitis due to respiratory syncytial virus (RSV)    89 year old male with PMH sig for COPD, paroxysmal atrial fibrillation, hypertension, CKD4, hyperlipidemia, diabetes mellitus type 2, history of CVA, hypothyroidism, 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

## 2024-03-28 ENCOUNTER — APPOINTMENT (OUTPATIENT)
Dept: GENERAL RADIOLOGY | Facility: HOSPITAL | Age: 89
End: 2024-03-28
Attending: HOSPITALIST
Payer: COMMERCIAL

## 2024-03-28 LAB
ANION GAP SERPL CALC-SCNC: 6 MMOL/L (ref 0–18)
APTT PPP: 36.3 SECONDS (ref 23.3–35.6)
BASOPHILS # BLD AUTO: 0.02 X10(3) UL (ref 0–0.2)
BASOPHILS NFR BLD AUTO: 0.1 %
BUN BLD-MCNC: 64 MG/DL (ref 9–23)
CALCIUM BLD-MCNC: 9.8 MG/DL (ref 8.5–10.1)
CHLORIDE SERPL-SCNC: 104 MMOL/L (ref 98–112)
CO2 SERPL-SCNC: 26 MMOL/L (ref 21–32)
CREAT BLD-MCNC: 2.15 MG/DL
EGFRCR SERPLBLD CKD-EPI 2021: 28 ML/MIN/1.73M2 (ref 60–?)
EOSINOPHIL # BLD AUTO: 0 X10(3) UL (ref 0–0.7)
EOSINOPHIL NFR BLD AUTO: 0 %
ERYTHROCYTE [DISTWIDTH] IN BLOOD BY AUTOMATED COUNT: 14 %
GLUCOSE BLD-MCNC: 196 MG/DL (ref 70–99)
GLUCOSE BLD-MCNC: 348 MG/DL (ref 70–99)
GLUCOSE BLD-MCNC: 440 MG/DL (ref 70–99)
GLUCOSE BLD-MCNC: 465 MG/DL (ref 70–99)
HCT VFR BLD AUTO: 31.1 %
HGB BLD-MCNC: 10.4 G/DL
IMM GRANULOCYTES # BLD AUTO: 0.06 X10(3) UL (ref 0–1)
IMM GRANULOCYTES NFR BLD: 0.4 %
LYMPHOCYTES # BLD AUTO: 0.27 X10(3) UL (ref 1–4)
LYMPHOCYTES NFR BLD AUTO: 1.9 %
MCH RBC QN AUTO: 26.5 PG (ref 26–34)
MCHC RBC AUTO-ENTMCNC: 33.4 G/DL (ref 31–37)
MCV RBC AUTO: 79.1 FL
MONOCYTES # BLD AUTO: 1.41 X10(3) UL (ref 0.1–1)
MONOCYTES NFR BLD AUTO: 9.7 %
NEUTROPHILS # BLD AUTO: 12.81 X10 (3) UL (ref 1.5–7.7)
NEUTROPHILS # BLD AUTO: 12.81 X10(3) UL (ref 1.5–7.7)
NEUTROPHILS NFR BLD AUTO: 87.9 %
OSMOLALITY SERPL CALC.SUM OF ELEC: 314 MOSM/KG (ref 275–295)
PLATELET # BLD AUTO: 335 10(3)UL (ref 150–450)
POTASSIUM SERPL-SCNC: 5 MMOL/L (ref 3.5–5.1)
RBC # BLD AUTO: 3.93 X10(6)UL
SODIUM SERPL-SCNC: 136 MMOL/L (ref 136–145)
WBC # BLD AUTO: 14.6 X10(3) UL (ref 4–11)

## 2024-03-28 PROCEDURE — 71045 X-RAY EXAM CHEST 1 VIEW: CPT | Performed by: HOSPITALIST

## 2024-03-28 PROCEDURE — 85025 COMPLETE CBC W/AUTO DIFF WBC: CPT | Performed by: HOSPITALIST

## 2024-03-28 PROCEDURE — 74018 RADEX ABDOMEN 1 VIEW: CPT | Performed by: HOSPITALIST

## 2024-03-28 PROCEDURE — 85730 THROMBOPLASTIN TIME PARTIAL: CPT | Performed by: HOSPITALIST

## 2024-03-28 PROCEDURE — 82962 GLUCOSE BLOOD TEST: CPT

## 2024-03-28 PROCEDURE — 93005 ELECTROCARDIOGRAM TRACING: CPT

## 2024-03-28 PROCEDURE — 94799 UNLISTED PULMONARY SVC/PX: CPT

## 2024-03-28 PROCEDURE — 80048 BASIC METABOLIC PNL TOTAL CA: CPT | Performed by: HOSPITALIST

## 2024-03-28 PROCEDURE — 92526 ORAL FUNCTION THERAPY: CPT

## 2024-03-28 PROCEDURE — 94640 AIRWAY INHALATION TREATMENT: CPT

## 2024-03-28 PROCEDURE — 93010 ELECTROCARDIOGRAM REPORT: CPT | Performed by: INTERNAL MEDICINE

## 2024-03-28 RX ORDER — METOPROLOL TARTRATE 1 MG/ML
2.5 INJECTION, SOLUTION INTRAVENOUS EVERY 6 HOURS
Status: DISCONTINUED | OUTPATIENT
Start: 2024-03-28 | End: 2024-03-30

## 2024-03-28 RX ORDER — HEPARIN SODIUM 1000 [USP'U]/ML
60 INJECTION, SOLUTION INTRAVENOUS; SUBCUTANEOUS ONCE
Status: COMPLETED | OUTPATIENT
Start: 2024-03-28 | End: 2024-03-28

## 2024-03-28 RX ORDER — METOPROLOL TARTRATE 1 MG/ML
5 INJECTION, SOLUTION INTRAVENOUS EVERY 6 HOURS
Status: DISCONTINUED | OUTPATIENT
Start: 2024-03-28 | End: 2024-03-28

## 2024-03-28 RX ORDER — METOPROLOL TARTRATE 1 MG/ML
5 INJECTION, SOLUTION INTRAVENOUS ONCE
Status: COMPLETED | OUTPATIENT
Start: 2024-03-28 | End: 2024-03-28

## 2024-03-28 RX ORDER — INSULIN ASPART 100 [IU]/ML
INJECTION, SOLUTION INTRAVENOUS; SUBCUTANEOUS ONCE
Status: DISCONTINUED | OUTPATIENT
Start: 2024-03-28 | End: 2024-03-28

## 2024-03-28 RX ORDER — HEPARIN SODIUM AND DEXTROSE 10000; 5 [USP'U]/100ML; G/100ML
12 INJECTION INTRAVENOUS ONCE
Status: COMPLETED | OUTPATIENT
Start: 2024-03-28 | End: 2024-03-28

## 2024-03-28 RX ORDER — NICOTINE POLACRILEX 4 MG
15 LOZENGE BUCCAL
Status: DISCONTINUED | OUTPATIENT
Start: 2024-03-28 | End: 2024-04-06

## 2024-03-28 RX ORDER — NICOTINE POLACRILEX 4 MG
30 LOZENGE BUCCAL
Status: DISCONTINUED | OUTPATIENT
Start: 2024-03-28 | End: 2024-04-06

## 2024-03-28 RX ORDER — DEXTROSE MONOHYDRATE 25 G/50ML
50 INJECTION, SOLUTION INTRAVENOUS
Status: DISCONTINUED | OUTPATIENT
Start: 2024-03-28 | End: 2024-04-06

## 2024-03-28 RX ORDER — BISACODYL 10 MG
10 SUPPOSITORY, RECTAL RECTAL
Status: DISCONTINUED | OUTPATIENT
Start: 2024-03-28 | End: 2024-04-06

## 2024-03-28 RX ORDER — ACETAMINOPHEN 650 MG/1
650 SUPPOSITORY RECTAL EVERY 6 HOURS PRN
Status: DISCONTINUED | OUTPATIENT
Start: 2024-03-28 | End: 2024-04-06

## 2024-03-28 RX ORDER — SENNOSIDES 8.6 MG
8.6 TABLET ORAL
Status: DISCONTINUED | OUTPATIENT
Start: 2024-03-28 | End: 2024-04-06

## 2024-03-28 RX ORDER — HEPARIN SODIUM AND DEXTROSE 10000; 5 [USP'U]/100ML; G/100ML
INJECTION INTRAVENOUS CONTINUOUS
Status: DISCONTINUED | OUTPATIENT
Start: 2024-03-29 | End: 2024-03-29

## 2024-03-28 RX ORDER — INSULIN ASPART 100 [IU]/ML
INJECTION, SOLUTION INTRAVENOUS; SUBCUTANEOUS ONCE
Status: COMPLETED | OUTPATIENT
Start: 2024-03-28 | End: 2024-03-28

## 2024-03-28 RX ORDER — LACTULOSE 10 G/15ML
30 SOLUTION ORAL
Status: DISPENSED | OUTPATIENT
Start: 2024-03-28 | End: 2024-03-28

## 2024-03-28 RX ORDER — SODIUM CHLORIDE, SODIUM LACTATE, POTASSIUM CHLORIDE, CALCIUM CHLORIDE 600; 310; 30; 20 MG/100ML; MG/100ML; MG/100ML; MG/100ML
INJECTION, SOLUTION INTRAVENOUS CONTINUOUS
Status: DISCONTINUED | OUTPATIENT
Start: 2024-03-28 | End: 2024-03-29

## 2024-03-28 NOTE — CM/SW NOTE
03/28/24 0900   CM/SW Referral Data   Referral Source Social Work (self-referral)   Reason for Referral Discharge planning   Informant EMR   Patient Info   Patient's Home Environment House   Patient lives with Son   Discharge Needs   Anticipated D/C needs Home health care     HOME SITUATION  Type of Home: House   Home Layout: Two level (stays on upper level)  Stairs to Enter : 2  Stairs to Bedroom: 12  Railing: Yes     Lives With: Son (Son's family)  Drives: No  Patient Owned Equipment: Rolling walker  Patient Regularly Uses: None     Prior Level of [place]: Pt typically ind with use of r/w in home. Pt stays on upper level of home. Dtr present and reports pt only comes downstairs approx 1 x month when company is visiting. Family assists pt with stairs when needed. Pt denies falls in last 6 months.  (Per PT Evaluation)      Patient is a 89 y/o male who admitted with Acute bronchiolitis due to respiratory syncytial virus and COPD exacerbation. PT recommending HH. Asked DSC to send referrals in aidin. Awaiting accepting provider and patient choice. SW will remain available.     LORI Geronimo  Discharge Planner  [phone]

## 2024-03-28 NOTE — SLP NOTE
SPEECH DAILY NOTE - INPATIENT    ASSESSMENT & PLAN   ASSESSMENT  Pt seen for dysphagia tx to assess tolerance with recommended diet, ensure proper utilization of aspiration precautions and provide pt/family education.  Patient received alert in bedside chair with daughter present at bedside. Patient reported feeling much improved compared to yesterday's visit. He reported having trials of thin liquids without s/s of aspiration. PO trials of thin liquids provided and patient able to consume >6 ounces of thin liquid via cup sip without overt s/s of aspiration. Recommend patient advance to a soft diet and thin liquids. Education provided re: aspiration precautions; pt and daughter voiced understanding. SLP will sign off as patient now tolerating his baseline diet.    Diet Recommendations - Solids: Soft/ Easy to chew  Diet Recommendations - Liquids: Thin Liquids    Compensatory Strategies Recommended: Small bites and sips  Aspiration Precautions: Upright position  Medication Administration Recommendations: One pill at a time (w/ puree prn)    Patient Experiencing Pain: No                Treatment Plan  Treatment Plan/Recommendations: No further inpatient SLP service warranted    Interdisciplinary Communication: Discussed with RN            GOALS  Goal #1 The patient will tolerate soft & bite-sized consistency and mildly thick liquids without overt signs or symptoms of aspiration with 95 % accuracy over 1-2 session(s).  Met - Advanced   Goal #2 The patient/family/caregiver will demonstrate understanding and implementation of aspiration precautions and swallow strategies independently over 1-2 session(s).     Met   Goal #3 VFSS if clinically indicated.  Discotninue       FOLLOW UP  Follow Up Needed (Documentation Required): No  SLP Follow-up Date: 03/28/24       Session: 1/1    If you have any questions, please contact JULIETA Baxter

## 2024-03-28 NOTE — PLAN OF CARE
Pt Aox4. Spo2 >90% on RA. PO steroids, nebs. Afib on tele. Up x1 walker to restroom. Nectar thick liquids. IV SL. Denies pain, n/v/d/c. Maalox for indigestion. Pt updated on POC. Call light within reach, safety precautions in place. No further pt needs at this time.     Problem: RESPIRATORY - ADULT  Goal: Achieves optimal ventilation and oxygenation  Description: INTERVENTIONS:  - Assess for changes in respiratory status  - Assess for changes in mentation and behavior  - Position to facilitate oxygenation and minimize respiratory effort  - Oxygen supplementation based on oxygen saturation or ABGs  - Provide Smoking Cessation handout, if applicable  - Encourage broncho-pulmonary hygiene including cough, deep breathe, Incentive Spirometry  - Assess the need for suctioning and perform as needed  - Assess and instruct to report SOB or any respiratory difficulty  - Respiratory Therapy support as indicated  - Manage/alleviate anxiety  - Monitor for signs/symptoms of CO2 retention  Outcome: Progressing

## 2024-03-28 NOTE — CM/SW NOTE
Department  notified of request for HHC, aidin referrals started. Assigned CM/SW to follow up with pt/family on further discharge planning.     Tracie Sky, DSC

## 2024-03-28 NOTE — PLAN OF CARE
Patient vomited due to coughing. Patient became tachy on tele, began to desat on room air. Did EKG and put patient on 5 L O2. EKG indicated Afib RVR. Started patient on IV metoprolol and IVF per MD orders.     Problem: Patient/Family Goals  Goal: Patient/Family Long Term Goal  Description: Patient's Long Term Goal: discharge home    Interventions:  - See additional Care Plan goals for specific interventions  Outcome: Progressing  Goal: Patient/Family Short Term Goal  Description: Patient's Short Term Goal:   3/28: Maintain safety    Interventions:   - See additional Care Plan goals for specific interventions  Outcome: Progressing

## 2024-03-28 NOTE — PROGRESS NOTES
[facility]  Hospitalist Progress Note                                                                     [facility]   part of [facility] [place]  12/8/1932    SUBJECTIVE:  Patient seen and examined.  Breathing improving.  Still with coughing.   NAD.   Daughter bedside requesting iron.       OBJECTIVE:  Temp:  [97.7 °F (36.5 °C)-99.5 °F (37.5 °C)] 97.8 °F (36.6 °C)  Pulse:  [] 91  Resp:  [18-20] 18  BP: (128-159)/(43-61) 153/45  SpO2:  [92 %-96 %] 94 %  Exam  Gen: No acute distress, alert and oriented x3, no focal neurologic deficits  Pulm: Lungs with bilateral rhonchi, wheezing improving   CV: Heart with regular rate and rhythm, no murmur.  Normal PMI.    Abd: Abdomen soft, nontender, nondistended, no organomegaly, bowel sounds present  MSK: Full range of motion in extremities, no clubbing, no cyanosis  Skin: no rashes or lesions    Labs:   Recent Labs   Lab 03/26/24  0933 03/27/24  0656 03/28/24  0701   WBC 9.0 16.1* 14.6*   HGB 11.7* 10.2* 10.4*   MCV 83.7 78.5* 79.1*   .0 304.0 335.0       Recent Labs   Lab 03/26/24  0933 03/27/24  0656 03/28/24  0701    135* 136   K 4.8 4.4 5.0    103 104   CO2 29.0 26.0 26.0   BUN 25* 42* 64*   CREATSERUM 1.82* 1.95* 2.15*   CA 10.0 9.7 9.8   MG  --  2.2  --    * 316* 348*       Recent Labs   Lab 03/26/24  0933 03/27/24  0656   ALT 18 14*   AST 18 14*   ALB 3.4 2.8*       No results for input(s): \"PGLU\" in the last 168 hours.    Meds:   Scheduled:    iron sucrose  200 mg Intravenous Daily    [Held by provider] furosemide  40 mg Oral BID (Diuretic)    predniSONE  40 mg Oral Daily with breakfast    atorvastatin  40 mg Oral Nightly    fluticasone furoate-vilanterol  1 puff Inhalation Daily    levothyroxine  50 mcg Oral Daily    losartan  100 mg Oral Daily    metoprolol tartrate  12.5 mg Oral 2x Daily(Beta Blocker)    pantoprazole  40 mg Oral QAM AC     ipratropium-albuterol  3 mL Nebulization Q6H    guaiFENesin ER  600 mg Oral BID    apixaban  2.5 mg Oral BID    tamsulosin  0.4 mg Oral Nightly     Continuous Infusions:   PRN: sennosides, alum-mag hydroxide-simethicone, acetaminophen, melatonin, ondansetron, metoclopramide, benzonatate, ipratropium-albuterol, benzocaine-menthol, acetaminophen, benzocaine-menthol    Assessment/Plan:  Principal Problem:    COPD exacerbation (HCC)  Active Problems:    Pleural effusion on right    Acute bronchiolitis due to respiratory syncytial virus (RSV)    89 year old male with PMH sig for COPD, paroxysmal atrial fibrillation, hypertension, CKD4, hyperlipidemia, diabetes mellitus type 2, history of CVA, hypothyroidism, 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  - stable     CKD stage 4  - BLE edema  - elevated BNP  - rising BUN, dc IV lasix and resume 40 BID po tomorrow   - should consider outpatient nephrology f/u     Anemia, chronic  - likely from CKD  - venofer      PAF  - metoprolol, Eliquis      Essential HTN  - metoprolol, losartan     Hyperlipidemia  - statin     Hx CVA  - asa, statin     Hypothyroidism   - sytnhroid      FEN: regular diet, PT/OT  Proph: SCDs, eliquis  Code status: Full code     Dispo - dc when rhonchi and PETERSEN improves    Chanel Seaman MD  [facility] Hospitalist  Message over [employer]  Pager: [phone]

## 2024-03-28 NOTE — PROGRESS NOTES
[facility]ULMONARY/CRIT[facility] CARE    S: no new events overnight.    Meds:   furosemide  40 mg Oral BID (Diuretic)    predniSONE  40 mg Oral Daily with breakfast    atorvastatin  40 mg Oral Nightly    fluticasone furoate-vilanterol  1 puff Inhalation Daily    levothyroxine  50 mcg Oral Daily    losartan  100 mg Oral Daily    metoprolol tartrate  12.5 mg Oral 2x Daily(Beta Blocker)    pantoprazole  40 mg Oral QAM AC    ipratropium-albuterol  3 mL Nebulization Q6H    guaiFENesin ER  600 mg Oral BID    apixaban  2.5 mg Oral BID    tamsulosin  0.4 mg Oral Nightly       Prn Meds:  sennosides, alum-mag hydroxide-simethicone, acetaminophen, melatonin, ondansetron, metoclopramide, benzonatate, ipratropium-albuterol, benzocaine-menthol, acetaminophen, benzocaine-menthol    Infusions:      OBJECTIVE:  Vitals:    03/28/24 0035 03/28/24 0536 03/28/24 0744 03/28/24 0745   BP: 133/61 140/46  159/43   Pulse: 94 93 100 85   Resp: 20 18 18 18   Temp: 97.7 °F (36.5 °C) 99.5 °F (37.5 °C)  98 °F (36.7 °C)   TempSrc: Oral Oral  Oral   SpO2: 96% 92% 92% 93%   Weight:       Height:           Oxygen Therapy  SpO2: 93 %  O2 Device: None (Room air)  O2 Flow Rate (L/min): 0 L/min  Pulse Oximetry Type: Continuous  Oximetry Probe Site Changed: No  Pulse Ox Probe Location: Right hand           No intake/output data recorded.  No intake/output data recorded.    Lungs: rhonchi bilaterally  Heart: regular rate and rhythm  Abdomen: soft, non-tender; bowel sounds normal; no masses,  no organomegaly  Extremities: extremities normal, atraumatic, no cyanosis or edema    Labs:  Recent Labs   Lab 03/26/24  0933 03/27/24  0656 03/28/24  0701   RBC 4.48 3.90 3.93   HGB 11.7* 10.2* 10.4*   HCT 37.5* 30.6* 31.1*   MCV 83.7 78.5* 79.1*   MCH 26.1 26.2 26.5   MCHC 31.2 33.3 33.4   RDW 14.3 14.3 14.0   NEPRELIM 6.67 14.85* 12.81*   WBC 9.0 16.1* 14.6*   .0 304.0 335.0     Recent Labs   Lab 03/26/24  0933 03/27/24  0656 03/28/24  0701   * 316* 348*   BUN  25* 42* 64*   CREATSERUM 1.82* 1.95* 2.15*   CA 10.0 9.7 9.8   ALB 3.4 2.8*  --     135* 136   K 4.8 4.4 5.0    103 104   CO2 29.0 26.0 26.0   ALKPHO 103 84  --    AST 18 14*  --    ALT 18 14*  --    BILT 0.7 0.5  --    TP 8.5* 7.3  --      No results for input(s): \"PCT\" in the last 168 hours.  Recent Labs     03/27/24  0656   KESHIA 80.9     Lab Results   Component Value Date    COVID19 Not Detected 03/26/2024     No results for input(s): \"ABGPHT\", \"AMSLNN7T\", \"LFYCX1H\", \"ABGHCO3\", \"LM\", \"FIO2\" in the last 72 hours.  Recent Labs     03/26/24  1013   LACTI 1.5       No results found for: \"PT\", \"INR\"     No results for input(s): \"TROP\", \"CK\" in the last 168 hours.    Imaging -- reviewed and visualized    Assessment and Plan    Dyspnea -- secondary to COPD exac asso with RSV infection.  -O2 PRN  Acute Exac of COPD secondary to RSV  -no acute infiltrates on CT chest, monitor off Abx  -continue with PO prednisone given improvement in wheezes.  -Breo  -BD protocol  Pleural Effusion -- chronic R effusion  -tapped in past with trapped lung  -would not rec thora at this time  Abn CXR -- there is a R lateral pleural based nodular density on the CXR that does not appear to have been present in the past  -will need out patient follow up with Dr. Jones.  Dispo -- inpatient  -will follow    Fabian Fry M.D.  Pulmonary/Critical Care and Sleep Medicine

## 2024-03-28 NOTE — OCCUPATIONAL THERAPY NOTE
OCCUPATIONAL THERAPY EVALUATION - INPATIENT     Room Number: 531/531-A  Evaluation Date: 3/27/2024  Type of Evaluation: Initial  Presenting Problem: RSV, COPD exacerbation, bronchiolitis, chronic pleural effusion with trapped lung    Physician Order: IP Consult to Occupational Therapy  Reason for Therapy: ADL/IADL Dysfunction and Discharge Planning    OCCUPATIONAL THERAPY ASSESSMENT   Patient is currently functioning below baseline with toileting, bathing, upper body dressing, lower body dressing, bed mobility, and transfers. Prior to admission, patient's baseline is mod I with toileting with the use of a RW, family assist with showers and dressing as needed.  Patient is requiring minimal assist as a result of the following impairments: decreased functional strength, decreased functional reach, decreased endurance, and medical status. Occupational Therapy will continue to follow for duration of hospitalization.    Patient will benefit from continued skilled OT Services at discharge to promote functional independence in home.  Anticipate patient will return home with home health OT      History Related to Current Admission: Patient is a 89 year old male admitted on 3/26/2024 with Presenting Problem: RSV, COPD exacerbation, bronchiolitis, chronic pleural effusion with trapped lung. Co-Morbidities : DM, A-fib, hx CVA    Recommendations for nursing staff:   Transfers: min A with RW  Toileting location: toilet     EVALUATION SESSION  Patient Start of Session: Pt was found sitting in his chair with his daughter present.   FUNCTIONAL TRANSFER ASSESSMENT  Sit to Stand: Chair  Chair: Contact Guard Assist    BED MOBILITY     BALANCE ASSESSMENT     FUNCTIONAL ADL ASSESSMENT       ACTIVITY TOLERANCE:                          O2 SATURATIONS       Cognition  WFL  Follows commands   Pleasant and cooperative     Upper extremity  WFL    EDUCATION PROVIDED  Patient: Role of Occupational Therapy; Plan of Care; Discharge  Recommendations  Patient's Response to Education: Verbalized Understanding  Family/Caregiver: Role of Occupational Therapy; Plan of Care; Discharge Recommendations  Family/Caregiver's Response to Education: Verbalized Understanding      Equipment used: RW, gait belt   Demonstrates functional use, Would benefit from additional trial     Therapist comments: sitting in chair>stand to RW>walk approx 10ft>sitting in chair. Pt educated on therapeutic respiratory exercises     Patient End of Session: Up in chair;Needs met;Call light within reach;RN aware of session/findings;Alarm set;Family present    OCCUPATIONAL PROFILE    HOME SITUATION  Type of Home: House  Home Layout: Two level (stays on upper level)  Lives With: Son (Son's family)    Toilet and Equipment: Comfort height toilet  Shower/Tub and Equipment: Tub-shower combo;Shower chair  Other Equipment: Other (Comment) (RW)    Occupation/Status: Retired  Hand Dominance: Right  Drives: No  Patient Regularly Uses: None    Prior Level of Function: Pt is now living with his son and his son's family. Pt has someone with him at all times. Pt stays in his room  except for coming down the stairs once per month when they have visitors. Pt uses a RW at home. Pt is typically mod independent with toileting. Pt has family assist as needed with showers and dressing and occasionally depends management.     SUBJECTIVE   \"He stays in his room .\" Per pt's daughter     PAIN ASSESSMENT  Ratin  Location: N/A       OBJECTIVE  Precautions: Bed/chair alarm  Fall Risk: High fall risk    WEIGHT BEARING RESTRICTION  Weight Bearing Restriction: None                ASSESSMENTS    AM-PAC ‘6-Clicks’ Inpatient Daily Activity Short Form  -   Putting on and taking off regular lower body clothing?: A Little  -   Bathing (including washing, rinsing, drying)?: A Little  -   Toileting, which includes using toilet, bedpan or urinal? : A Little  -   Putting on and taking off regular upper body  clothing?: A Little  -   Taking care of personal grooming such as brushing teeth?: A Little  -   Eating meals?: None    AM-PAC Score:  Score: 19  Approx Degree of Impairment: 42.8%  Standardized Score (AM-PAC Scale): 40.22    PLAN  OT Treatment Plan: Balance activities;Energy conservation/work simplification techniques;ADL training;Functional transfer training;Endurance training;Patient/Family education;Patient/Family training;Equipment eval/education;Compensatory technique education;Continued evaluation  Rehab Potential : Good  Frequency: Daily  Number of Visits to Meet Established Goals: 2    ADL Goals  Patient will perform toileting with supervision and AE PRN  Patient will perform LB dressing with min A and AE PRN    Functional Transfer Goals  Patient will perform bed mobility supine to sit with supervision  Patient will perform bed mobility sit to supine with supervision  Patient will perform toilet transfer with supervision        Patient Evaluation Complexity Level:   Occupational Profile/Medical History MODERATE - Expanded review of history including review of medical or therapy record   Specific performance deficits impacting engagement in ADL/IADL MODERATE  3 - 5 performance deficits   Client Assessment/Performance Deficits MODERATE - Comorbidities and min to mod modifications of tasks    Clinical Decision Making MODERATE - Analysis of occupational profile, detailed assessments, several treatment options    Overall Complexity MODERATE     OT Session Time: 30 minutes  Therapeutic Activity: 15 minutes

## 2024-03-28 NOTE — PLAN OF CARE
Pt Aox4. Spo2 >90% on RA. PO steroids, nebs. Afib on tele. Up x1 walker to restroom. Nectar thick liquids. IV SL. Denies pain, n/v/d/c. Pt updated on POC. Call light within reach, safety precautions in place. No further pt needs at this time.     Problem: RESPIRATORY - ADULT  Goal: Achieves optimal ventilation and oxygenation  Description: INTERVENTIONS:  - Assess for changes in respiratory status  - Assess for changes in mentation and behavior  - Position to facilitate oxygenation and minimize respiratory effort  - Oxygen supplementation based on oxygen saturation or ABGs  - Provide Smoking Cessation handout, if applicable  - Encourage broncho-pulmonary hygiene including cough, deep breathe, Incentive Spirometry  - Assess the need for suctioning and perform as needed  - Assess and instruct to report SOB or any respiratory difficulty  - Respiratory Therapy support as indicated  - Manage/alleviate anxiety  - Monitor for signs/symptoms of CO2 retention  Outcome: Progressing

## 2024-03-29 ENCOUNTER — APPOINTMENT (OUTPATIENT)
Dept: GENERAL RADIOLOGY | Facility: HOSPITAL | Age: 89
End: 2024-03-29
Attending: HOSPITALIST
Payer: COMMERCIAL

## 2024-03-29 LAB
ANION GAP SERPL CALC-SCNC: 7 MMOL/L (ref 0–18)
APTT PPP: 119.7 SECONDS (ref 23.3–35.6)
APTT PPP: 147.4 SECONDS (ref 23.3–35.6)
BASOPHILS # BLD: 0 X10(3) UL (ref 0–0.2)
BASOPHILS NFR BLD: 0 %
BUN BLD-MCNC: 73 MG/DL (ref 9–23)
CALCIUM BLD-MCNC: 9.8 MG/DL (ref 8.5–10.1)
CHLORIDE SERPL-SCNC: 105 MMOL/L (ref 98–112)
CO2 SERPL-SCNC: 27 MMOL/L (ref 21–32)
CREAT BLD-MCNC: 2.51 MG/DL
EGFRCR SERPLBLD CKD-EPI 2021: 24 ML/MIN/1.73M2 (ref 60–?)
EOSINOPHIL # BLD: 0 X10(3) UL (ref 0–0.7)
EOSINOPHIL NFR BLD: 0 %
ERYTHROCYTE [DISTWIDTH] IN BLOOD BY AUTOMATED COUNT: 14.3 %
GLUCOSE BLD-MCNC: 256 MG/DL (ref 70–99)
GLUCOSE BLD-MCNC: 271 MG/DL (ref 70–99)
GLUCOSE BLD-MCNC: 278 MG/DL (ref 70–99)
GLUCOSE BLD-MCNC: 313 MG/DL (ref 70–99)
GLUCOSE BLD-MCNC: 320 MG/DL (ref 70–99)
GLUCOSE BLD-MCNC: 379 MG/DL (ref 70–99)
HCT VFR BLD AUTO: 31.1 %
HGB BLD-MCNC: 10.1 G/DL
LYMPHOCYTES NFR BLD: 0.58 X10(3) UL (ref 1–4)
LYMPHOCYTES NFR BLD: 8 %
MCH RBC QN AUTO: 25.9 PG (ref 26–34)
MCHC RBC AUTO-ENTMCNC: 32.5 G/DL (ref 31–37)
MCV RBC AUTO: 79.7 FL
METAMYELOCYTES # BLD: 0.44 X10(3) UL
METAMYELOCYTES NFR BLD: 6 %
MONOCYTES # BLD: 0.37 X10(3) UL (ref 0.1–1)
MONOCYTES NFR BLD: 5 %
MORPHOLOGY: NORMAL
NEUTROPHILS # BLD AUTO: 6.47 X10 (3) UL (ref 1.5–7.7)
NEUTROPHILS NFR BLD: 32 %
NEUTS BAND NFR BLD: 49 %
NEUTS HYPERSEG # BLD: 5.91 X10(3) UL (ref 1.5–7.7)
OSMOLALITY SERPL CALC.SUM OF ELEC: 319 MOSM/KG (ref 275–295)
PLATELET # BLD AUTO: 268 10(3)UL (ref 150–450)
PLATELET MORPHOLOGY: NORMAL
POTASSIUM SERPL-SCNC: 4.4 MMOL/L (ref 3.5–5.1)
PROCALCITONIN SERPL-MCNC: 7.29 NG/ML (ref ?–0.16)
RBC # BLD AUTO: 3.9 X10(6)UL
SODIUM SERPL-SCNC: 139 MMOL/L (ref 136–145)
TOTAL CELLS COUNTED BLD: 100
WBC # BLD AUTO: 7.3 X10(3) UL (ref 4–11)

## 2024-03-29 PROCEDURE — 85730 THROMBOPLASTIN TIME PARTIAL: CPT | Performed by: HOSPITALIST

## 2024-03-29 PROCEDURE — 85027 COMPLETE CBC AUTOMATED: CPT | Performed by: HOSPITALIST

## 2024-03-29 PROCEDURE — 71045 X-RAY EXAM CHEST 1 VIEW: CPT | Performed by: HOSPITALIST

## 2024-03-29 PROCEDURE — 85007 BL SMEAR W/DIFF WBC COUNT: CPT | Performed by: HOSPITALIST

## 2024-03-29 PROCEDURE — 93005 ELECTROCARDIOGRAM TRACING: CPT

## 2024-03-29 PROCEDURE — 94640 AIRWAY INHALATION TREATMENT: CPT

## 2024-03-29 PROCEDURE — 93010 ELECTROCARDIOGRAM REPORT: CPT | Performed by: INTERNAL MEDICINE

## 2024-03-29 PROCEDURE — 80048 BASIC METABOLIC PNL TOTAL CA: CPT | Performed by: HOSPITALIST

## 2024-03-29 PROCEDURE — 82962 GLUCOSE BLOOD TEST: CPT

## 2024-03-29 PROCEDURE — 85025 COMPLETE CBC W/AUTO DIFF WBC: CPT | Performed by: HOSPITALIST

## 2024-03-29 PROCEDURE — 84145 PROCALCITONIN (PCT): CPT | Performed by: HOSPITALIST

## 2024-03-29 RX ORDER — ACETYLCYSTEINE 200 MG/ML
2 SOLUTION ORAL; RESPIRATORY (INHALATION) EVERY 8 HOURS
Status: DISCONTINUED | OUTPATIENT
Start: 2024-03-29 | End: 2024-03-29

## 2024-03-29 RX ORDER — LACTULOSE 10 G/15ML
30 SOLUTION ORAL
Status: DISPENSED | OUTPATIENT
Start: 2024-03-29 | End: 2024-03-29

## 2024-03-29 RX ORDER — ACETYLCYSTEINE 200 MG/ML
2 SOLUTION ORAL; RESPIRATORY (INHALATION) 2 TIMES DAILY
Status: DISCONTINUED | OUTPATIENT
Start: 2024-03-29 | End: 2024-04-06

## 2024-03-29 RX ORDER — IPRATROPIUM BROMIDE AND ALBUTEROL SULFATE 2.5; .5 MG/3ML; MG/3ML
3 SOLUTION RESPIRATORY (INHALATION)
Status: DISCONTINUED | OUTPATIENT
Start: 2024-03-29 | End: 2024-03-29

## 2024-03-29 RX ORDER — SODIUM CHLORIDE 9 MG/ML
INJECTION, SOLUTION INTRAVENOUS ONCE
Status: DISCONTINUED | OUTPATIENT
Start: 2024-03-29 | End: 2024-03-29

## 2024-03-29 RX ORDER — DILTIAZEM HYDROCHLORIDE 5 MG/ML
10 INJECTION INTRAVENOUS ONCE
Status: DISCONTINUED | OUTPATIENT
Start: 2024-03-29 | End: 2024-04-06

## 2024-03-29 RX ORDER — IPRATROPIUM BROMIDE AND ALBUTEROL SULFATE 2.5; .5 MG/3ML; MG/3ML
3 SOLUTION RESPIRATORY (INHALATION)
Status: DISCONTINUED | OUTPATIENT
Start: 2024-03-29 | End: 2024-04-01

## 2024-03-29 RX ORDER — INSULIN DEGLUDEC 100 U/ML
6 INJECTION, SOLUTION SUBCUTANEOUS NIGHTLY
Status: DISCONTINUED | OUTPATIENT
Start: 2024-03-29 | End: 2024-03-30

## 2024-03-29 NOTE — PROGRESS NOTES
[facility]  Hospitalist Progress Note                                                                     [facility]   part of [facility]  12/8/1932    SUBJECTIVE:  Patient seen and examined.  Eventful evening/night - noted.   Currently in afib on tele, HR<120  Getting nebulizer treatment.  Fevers.  D/w children bedside.   NAD.     OBJECTIVE:  Temp:  [98.3 °F (36.8 °C)-102.4 °F (39.1 °C)] 98.9 °F (37.2 °C)  Pulse:  [] 88  Resp:  [18-39] 18  BP: (100-159)/(35-81) 112/38  SpO2:  [91 %-98 %] 97 %  Exam  Gen: No acute distress, alert and oriented x3, no focal neurologic deficits  Pulm: Lungs with bilateral rhonchi, wheezing improving   CV: Heart with regular rate and rhythm, no murmur.  Normal PMI.    Abd: Abdomen soft, nontender, nondistended, no organomegaly, bowel sounds present  MSK: Full range of motion in extremities, no clubbing, no cyanosis  Skin: no rashes or lesions    Labs:   Recent Labs   Lab 03/26/24  0933 03/27/24  0656 03/28/24  0701 03/29/24  0411   WBC 9.0 16.1* 14.6* 7.3   HGB 11.7* 10.2* 10.4* 10.1*   MCV 83.7 78.5* 79.1* 79.7*   .0 304.0 335.0 268.0   BAND  --   --   --  49       Recent Labs   Lab 03/26/24  0933 03/27/24  0656 03/28/24  0701 03/29/24  0411    135* 136 139   K 4.8 4.4 5.0 4.4    103 104 105   CO2 29.0 26.0 26.0 27.0   BUN 25* 42* 64* 73*   CREATSERUM 1.82* 1.95* 2.15* 2.51*   CA 10.0 9.7 9.8 9.8   MG  --  2.2  --   --    * 316* 348* 271*       Recent Labs   Lab 03/26/24  0933 03/27/24  0656   ALT 18 14*   AST 18 14*   ALB 3.4 2.8*       Recent Labs   Lab 03/28/24  1725 03/28/24  1909 03/28/24  2315 03/29/24  0500 03/29/24  1114   PGLU 440* 465* 196* 256* 320*       Meds:   Scheduled:    ipratropium-albuterol  3 mL Nebulization 6 times per day    acetylcysteine  2 mL Nebulization Q8H    dilTIAZem  10 mg Intravenous Once    dilTIAZem  30 mg Oral 4 times per day     lactulose  30 g Oral Q2H    iron sucrose  200 mg Intravenous Daily    piperacillin-tazobactam  3.375 g Intravenous q12h    insulin aspart  10 Units Subcutaneous Once    insulin aspart  1-5 Units Subcutaneous TID AC and HS    metoprolol  2.5 mg Intravenous Q6H    [Held by provider] furosemide  40 mg Oral BID (Diuretic)    predniSONE  40 mg Oral Daily with breakfast    atorvastatin  40 mg Oral Nightly    fluticasone furoate-vilanterol  1 puff Inhalation Daily    levothyroxine  50 mcg Oral Daily    losartan  100 mg Oral Daily    [Held by provider] metoprolol tartrate  12.5 mg Oral 2x Daily(Beta Blocker)    pantoprazole  40 mg Oral QAM AC    guaiFENesin ER  600 mg Oral BID    apixaban  2.5 mg Oral BID    tamsulosin  0.4 mg Oral Nightly     Continuous Infusions:   PRN: dilTIAZem, sennosides, bisacodyl, glucose **OR** glucose **OR** glucose-vitamin C **OR** dextrose **OR** glucose **OR** glucose **OR** glucose-vitamin C, acetaminophen, alum-mag hydroxide-simethicone, acetaminophen, melatonin, ondansetron, metoclopramide, benzonatate, ipratropium-albuterol, benzocaine-menthol, acetaminophen, benzocaine-menthol    Assessment/Plan:  Principal Problem:    COPD exacerbation (HCC)  Active Problems:    Pleural effusion on right    Acute bronchiolitis due to respiratory syncytial virus (RSV)    89 year old male with PMH sig for COPD, paroxysmal atrial fibrillation, hypertension, CKD4, hyperlipidemia, diabetes mellitus type 2, history of CVA, hypothyroidism, 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     Acute hypoxic respiratory failure   Acute COPD exacerbation 2/2 RSV Bronchitis  Increase R lung infiltrate, likely secondary bacterial PNA  - cont IV solumedrol to prednisone, taper per pulm   - started zosyn 3/29, monitor response  - procalcitonin elevated, try for sputum cultures   - scheduled robin  - breo, BD protocol  - supportive cares     Sepsis from above  -  treat PNA, zosyn  - plan as above      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  - stable     CKD stage 4  - BLE edema  - elevated BNP  - rising BUN, will hold diuretics as clinically he looks dry  - resume lasix when volume status improves   - should consider outpatient nephrology f/u     Anemia, chronic  - likely from CKD  - venofer      PAF  - metoprolol, Eliquis   - given ongoing afib, will consult cardiology     Essential HTN  - metoprolol, losartan     Hyperlipidemia  - statin     Hx CVA  - asa, statin     Hypothyroidism   - sytnhroid     Constipation  - trial lactulose, bowel regimen      FEN: regular diet, PT/OT  Proph: SCDs, eliquis  Code status: Full code     Dispo - cont to monitor     Chanel Seaman MD  [facility] Hospitalist  Message over [employer] [place]  Pager: [phone]

## 2024-03-29 NOTE — CM/SW NOTE
Left message with patient's son to discuss discharge planning and HH. Await call back.    SW/CM to remain available for support and/or discharge planning.    LORI Geronimo  Discharge Planner  [phone]

## 2024-03-29 NOTE — PLAN OF CARE
Heparin drip discontinued this shift, patient to restart PO eliquis tonight. Started on IVF per MD order. Patient had a BM this shift. Afib on tele. O2 sat > 89 on 5L O2 NC. Family at bedside. All needs met.    Problem: Patient/Family Goals  Goal: Patient/Family Long Term Goal  Description: Patient's Long Term Goal: discharge home    Interventions:  - See additional Care Plan goals for specific interventions  Outcome: Progressing  Goal: Patient/Family Short Term Goal  Description: Patient's Short Term Goal:   3/28: Maintain safety  3/29: maintain safety    Interventions:   - See additional Care Plan goals for specific interventions  Outcome: Progressing

## 2024-03-29 NOTE — PLAN OF CARE
Pt Aox4. Primarily Sinhala speaking. Spo2 >90% on 4-6L HFNC. Very congested/wheezing- Nebs/ deep suction per RRT. PO steroids. Afib on tele- -140s MD notified. NPO per pt family- trouble swallowing/coughs when swallowing. Unable to take PO meds so now on IV BB and hep gtt. C/o constipation- KUB done MD aware- PRN suppository given. Febrile- PRN tylenol given. IVF, IV abx. QID accucheck. Pt/family updated on POC. Call light within reach, safety precations in place. Up x1 w walker, although BR for SOB with exertion at this time. No further pt needs at this time.     ** EKG done afib RVR- MD notified- Bolus done.          Problem: RESPIRATORY - ADULT  Goal: Achieves optimal ventilation and oxygenation  Description: INTERVENTIONS:  - Assess for changes in respiratory status  - Assess for changes in mentation and behavior  - Position to facilitate oxygenation and minimize respiratory effort  - Oxygen supplementation based on oxygen saturation or ABGs  - Provide Smoking Cessation handout, if applicable  - Encourage broncho-pulmonary hygiene including cough, deep breathe, Incentive Spirometry  - Assess the need for suctioning and perform as needed  - Assess and instruct to report SOB or any respiratory difficulty  - Respiratory Therapy support as indicated  - Manage/alleviate anxiety  - Monitor for signs/symptoms of CO2 retention  Outcome: Progressing     Problem: Patient/Family Goals  Goal: Patient/Family Long Term Goal  Description: Patient's Long Term Goal: discharge home    Interventions:  - See additional Care Plan goals for specific interventions  Outcome: Progressing  Goal: Patient/Family Short Term Goal  Description: Patient's Short Term Goal:   3/28: Maintain safety    Interventions:   - See additional Care Plan goals for specific interventions  Outcome: Progressing

## 2024-03-29 NOTE — PROGRESS NOTES
[facility]UL[facility]    S: overnight fever and new O2 needs.    Meds:   ipratropium-albuterol  3 mL Nebulization 6 times per day    acetylcysteine  2 mL Nebulization Q8H    iron sucrose  200 mg Intravenous Daily    piperacillin-tazobactam  3.375 g Intravenous q12h    insulin aspart  10 Units Subcutaneous Once    insulin aspart  1-5 Units Subcutaneous TID AC and HS    metoprolol  2.5 mg Intravenous Q6H    [Held by provider] furosemide  40 mg Oral BID (Diuretic)    predniSONE  40 mg Oral Daily with breakfast    atorvastatin  40 mg Oral Nightly    fluticasone furoate-vilanterol  1 puff Inhalation Daily    levothyroxine  50 mcg Oral Daily    losartan  100 mg Oral Daily    [Held by provider] metoprolol tartrate  12.5 mg Oral 2x Daily(Beta Blocker)    pantoprazole  40 mg Oral QAM AC    guaiFENesin ER  600 mg Oral BID    [Held by provider] apixaban  2.5 mg Oral BID    tamsulosin  0.4 mg Oral Nightly       Prn Meds:  sennosides, bisacodyl, glucose **OR** glucose **OR** glucose-vitamin C **OR** dextrose **OR** glucose **OR** glucose **OR** glucose-vitamin C, acetaminophen, alum-mag hydroxide-simethicone, acetaminophen, melatonin, ondansetron, metoclopramide, benzonatate, ipratropium-albuterol, benzocaine-menthol, acetaminophen, benzocaine-menthol    Infusions:   lactated ringers 50 mL/hr at 03/28/24 2352    continuous dose heparin 500 Units/hr (03/29/24 0627)       OBJECTIVE:  Vitals:    03/28/24 2323 03/29/24 0350 03/29/24 0538 03/29/24 0745   BP: 131/67 100/35 125/81 107/39   Pulse: 107 (!) 122 115 92   Resp: 20 24 20 18   Temp: (!) 100.5 °F (38.1 °C) 99.5 °F (37.5 °C)  98.3 °F (36.8 °C)   TempSrc: Oral Oral  Oral   SpO2: 91% 93% 92% 96%   Weight:       Height:           Oxygen Therapy  SpO2: 96 %  O2 Device: High flow nasal cannula  O2 Flow Rate (L/min): 4 L/min  Pulse Oximetry Type: Continuous  Oximetry Probe Site Changed: No  Pulse Ox Probe Location: Left hand           No intake/output data recorded.  No  intake/output data recorded.    Lungs: rhonchi with improved wheezes  Heart: regular rate and rhythm  Abdomen: soft, non-tender; bowel sounds normal; no masses,  no organomegaly  Extremities: extremities normal, atraumatic, no cyanosis or edema    Labs:  Recent Labs   Lab 03/27/24  0656 03/28/24  0701 03/29/24  0411   RBC 3.90 3.93 3.90   HGB 10.2* 10.4* 10.1*   HCT 30.6* 31.1* 31.1*   MCV 78.5* 79.1* 79.7*   MCH 26.2 26.5 25.9*   MCHC 33.3 33.4 32.5   RDW 14.3 14.0 14.3   NEPRELIM 14.85* 12.81* 6.47   WBC 16.1* 14.6* 7.3   .0 335.0 268.0     Recent Labs   Lab 03/26/24  0933 03/27/24  0656 03/28/24  0701 03/29/24  0411   * 316* 348* 271*   BUN 25* 42* 64* 73*   CREATSERUM 1.82* 1.95* 2.15* 2.51*   CA 10.0 9.7 9.8 9.8   ALB 3.4 2.8*  --   --     135* 136 139   K 4.8 4.4 5.0 4.4    103 104 105   CO2 29.0 26.0 26.0 27.0   ALKPHO 103 84  --   --    AST 18 14*  --   --    ALT 18 14*  --   --    BILT 0.7 0.5  --   --    TP 8.5* 7.3  --   --      No results for input(s): \"PCT\" in the last 168 hours.  Recent Labs     03/27/24  0656   KESHIA 80.9     Lab Results   Component Value Date    COVID19 Not Detected 03/26/2024     No results for input(s): \"ABGPHT\", \"ABVWUZ4U\", \"PGZZV7F\", \"ABGHCO3\", \"LM\", \"FIO2\" in the last 72 hours.  Recent Labs     03/26/24  1013   LACTI 1.5       No results found for: \"PT\", \"INR\"  Lab Results   Component Value Date    PGLU 256 03/29/2024     No results for input(s): \"TROP\", \"CK\" in the last 168 hours.    Imaging -- reviewed and visualized  CXR -- increased R mid lung infiltrate    Assessment and Plan    Dyspnea -- secondary to COPD exac asso with RSV infection and now with likely secondary bacterial PNA.  -O2 PRN  -pulm toilet  Acute Exac of COPD secondary to RSV with secondary bacterial pneumonia.  May be due to aspiration given patient's previous poor swallow eval.  -fever overnight with new more focal consolidations on CXR, rajan in RLL.  -zosyn Day #1    -continue with PO  prednisone given improvement in wheezes.  -Breo  -BD protocol  Pleural Effusion -- chronic R effusion  -tapped in past with trapped lung  -would not rec thora at this time  Abn CXR -- there is a R lateral pleural based nodular density on the CXR that does not appear to have been present in the past  -will need out patient follow up with Dr. Jones.  Dispo -- inpatient  -discussed with family at bedside.  -will follow    Fabian Fry M.D.  Pulmonary/Critical Care and Sleep Medicine

## 2024-03-29 NOTE — PHYSICAL THERAPY NOTE
IP PT attempted. Pt and son report pt fatigue and lack of sleep last night . Pt states he wants to sleep and requesting therapy return later. Will continue to follow .

## 2024-03-30 LAB
ANION GAP SERPL CALC-SCNC: 7 MMOL/L (ref 0–18)
APTT PPP: 46.9 SECONDS (ref 23.3–35.6)
APTT PPP: 55.7 SECONDS (ref 23.3–35.6)
BASOPHILS # BLD: 0 X10(3) UL (ref 0–0.2)
BASOPHILS NFR BLD: 0 %
BUN BLD-MCNC: 97 MG/DL (ref 9–23)
CALCIUM BLD-MCNC: 9.5 MG/DL (ref 8.5–10.1)
CHLORIDE SERPL-SCNC: 112 MMOL/L (ref 98–112)
CO2 SERPL-SCNC: 27 MMOL/L (ref 21–32)
CREAT BLD-MCNC: 3.14 MG/DL
CREAT UR-SCNC: 81.4 MG/DL
EGFRCR SERPLBLD CKD-EPI 2021: 18 ML/MIN/1.73M2 (ref 60–?)
EOSINOPHIL # BLD: 0.33 X10(3) UL (ref 0–0.7)
EOSINOPHIL NFR BLD: 2 %
ERYTHROCYTE [DISTWIDTH] IN BLOOD BY AUTOMATED COUNT: 14.6 %
GLUCOSE BLD-MCNC: 237 MG/DL (ref 70–99)
GLUCOSE BLD-MCNC: 272 MG/DL (ref 70–99)
GLUCOSE BLD-MCNC: 279 MG/DL (ref 70–99)
GLUCOSE BLD-MCNC: 323 MG/DL (ref 70–99)
GLUCOSE BLD-MCNC: 427 MG/DL (ref 70–99)
HCT VFR BLD AUTO: 28.3 %
HGB BLD-MCNC: 9 G/DL
LYMPHOCYTES NFR BLD: 0.65 X10(3) UL (ref 1–4)
LYMPHOCYTES NFR BLD: 4 %
MCH RBC QN AUTO: 25.6 PG (ref 26–34)
MCHC RBC AUTO-ENTMCNC: 31.8 G/DL (ref 31–37)
MCV RBC AUTO: 80.4 FL
MONOCYTES # BLD: 0.33 X10(3) UL (ref 0.1–1)
MONOCYTES NFR BLD: 2 %
MORPHOLOGY: NORMAL
MRSA DNA SPEC QL NAA+PROBE: NEGATIVE
NEUTROPHILS # BLD AUTO: 14.87 X10 (3) UL (ref 1.5–7.7)
NEUTROPHILS NFR BLD: 48 %
NEUTS BAND NFR BLD: 44 %
NEUTS HYPERSEG # BLD: 15 X10(3) UL (ref 1.5–7.7)
OSMOLALITY SERPL CALC.SUM OF ELEC: 342 MOSM/KG (ref 275–295)
PLATELET # BLD AUTO: 239 10(3)UL (ref 150–450)
PLATELET MORPHOLOGY: NORMAL
POTASSIUM SERPL-SCNC: 3.5 MMOL/L (ref 3.5–5.1)
RBC # BLD AUTO: 3.52 X10(6)UL
SODIUM SERPL-SCNC: 146 MMOL/L (ref 136–145)
SODIUM SERPL-SCNC: 9 MMOL/L
TOTAL CELLS COUNTED BLD: 100
TOXIC GRANULES BLD QL SMEAR: PRESENT
WBC # BLD AUTO: 16.3 X10(3) UL (ref 4–11)

## 2024-03-30 PROCEDURE — 80048 BASIC METABOLIC PNL TOTAL CA: CPT | Performed by: HOSPITALIST

## 2024-03-30 PROCEDURE — 85027 COMPLETE CBC AUTOMATED: CPT | Performed by: HOSPITALIST

## 2024-03-30 PROCEDURE — 94640 AIRWAY INHALATION TREATMENT: CPT

## 2024-03-30 PROCEDURE — 82962 GLUCOSE BLOOD TEST: CPT

## 2024-03-30 PROCEDURE — 82570 ASSAY OF URINE CREATININE: CPT | Performed by: INTERNAL MEDICINE

## 2024-03-30 PROCEDURE — 85025 COMPLETE CBC W/AUTO DIFF WBC: CPT | Performed by: HOSPITALIST

## 2024-03-30 PROCEDURE — 87641 MR-STAPH DNA AMP PROBE: CPT | Performed by: INTERNAL MEDICINE

## 2024-03-30 PROCEDURE — 94668 MNPJ CHEST WALL SBSQ: CPT

## 2024-03-30 PROCEDURE — C9113 INJ PANTOPRAZOLE SODIUM, VIA: HCPCS | Performed by: HOSPITALIST

## 2024-03-30 PROCEDURE — 85007 BL SMEAR W/DIFF WBC COUNT: CPT | Performed by: HOSPITALIST

## 2024-03-30 PROCEDURE — 84300 ASSAY OF URINE SODIUM: CPT | Performed by: INTERNAL MEDICINE

## 2024-03-30 PROCEDURE — 85730 THROMBOPLASTIN TIME PARTIAL: CPT | Performed by: INTERNAL MEDICINE

## 2024-03-30 RX ORDER — INSULIN DEGLUDEC 100 U/ML
6 INJECTION, SOLUTION SUBCUTANEOUS ONCE
Status: COMPLETED | OUTPATIENT
Start: 2024-03-30 | End: 2024-03-30

## 2024-03-30 RX ORDER — PANTOPRAZOLE SODIUM 40 MG/10ML
40 INJECTION, POWDER, LYOPHILIZED, FOR SOLUTION INTRAVENOUS EVERY 24 HOURS
Status: DISCONTINUED | OUTPATIENT
Start: 2024-03-30 | End: 2024-04-06

## 2024-03-30 RX ORDER — SODIUM CHLORIDE 450 MG/100ML
INJECTION, SOLUTION INTRAVENOUS CONTINUOUS
Status: DISCONTINUED | OUTPATIENT
Start: 2024-03-30 | End: 2024-03-30

## 2024-03-30 RX ORDER — SODIUM CHLORIDE 9 MG/ML
INJECTION, SOLUTION INTRAVENOUS CONTINUOUS
Status: DISCONTINUED | OUTPATIENT
Start: 2024-03-30 | End: 2024-03-31

## 2024-03-30 RX ORDER — INSULIN DEGLUDEC 100 U/ML
12 INJECTION, SOLUTION SUBCUTANEOUS NIGHTLY
Status: DISCONTINUED | OUTPATIENT
Start: 2024-03-30 | End: 2024-03-31

## 2024-03-30 RX ORDER — HEPARIN SODIUM AND DEXTROSE 10000; 5 [USP'U]/100ML; G/100ML
12 INJECTION INTRAVENOUS ONCE
Status: COMPLETED | OUTPATIENT
Start: 2024-03-30 | End: 2024-03-30

## 2024-03-30 RX ORDER — METHYLPREDNISOLONE SODIUM SUCCINATE 40 MG/ML
40 INJECTION, POWDER, LYOPHILIZED, FOR SOLUTION INTRAMUSCULAR; INTRAVENOUS DAILY
Status: DISCONTINUED | OUTPATIENT
Start: 2024-03-30 | End: 2024-04-01

## 2024-03-30 RX ORDER — HEPARIN SODIUM AND DEXTROSE 10000; 5 [USP'U]/100ML; G/100ML
INJECTION INTRAVENOUS CONTINUOUS
Status: DISCONTINUED | OUTPATIENT
Start: 2024-03-30 | End: 2024-04-02

## 2024-03-30 RX ORDER — HEPARIN SODIUM 1000 [USP'U]/ML
60 INJECTION, SOLUTION INTRAVENOUS; SUBCUTANEOUS ONCE
Status: DISCONTINUED | OUTPATIENT
Start: 2024-03-30 | End: 2024-03-30

## 2024-03-30 RX ORDER — METOPROLOL TARTRATE 1 MG/ML
5 INJECTION, SOLUTION INTRAVENOUS EVERY 6 HOURS
Status: DISCONTINUED | OUTPATIENT
Start: 2024-03-30 | End: 2024-04-03

## 2024-03-30 NOTE — PLAN OF CARE
3/30 AM: Pt is A/O x 3-4, family at bedside. Exp wheezing heard in lungs, scheduled nebs, on 3L. BM today, voids in primofit. Bladder scan shows 240 and 280. Very hypoxic with ambulation. Heparin drip going at 7 mL/hr, pending PTT draw in afternoon. Strict NPO, QID accucheck.      Problem: RESPIRATORY - ADULT  Goal: Achieves optimal ventilation and oxygenation  Description: INTERVENTIONS:  - Assess for changes in respiratory status  - Assess for changes in mentation and behavior  - Position to facilitate oxygenation and minimize respiratory effort  - Oxygen supplementation based on oxygen saturation or ABGs  - Provide Smoking Cessation handout, if applicable  - Encourage broncho-pulmonary hygiene including cough, deep breathe, Incentive Spirometry  - Assess the need for suctioning and perform as needed  - Assess and instruct to report SOB or any respiratory difficulty  - Respiratory Therapy support as indicated  - Manage/alleviate anxiety  - Monitor for signs/symptoms of CO2 retention  Outcome: Progressing

## 2024-03-30 NOTE — PROGRESS NOTES
Patient on Lopressor 2.5mg IVP q 6.  B/P 98/37, repeat 115/35.  Family is asking of IVF can be stopped. Page sent to Dr OLGA Coles.

## 2024-03-30 NOTE — PROGRESS NOTES
Patient assisted up to the bedside commode and had a medium amount of soft brown stool with bright red streaks on it.  He was just taken off heparin drip  today and is scheduled to take Eliquis tonight.  Patient's daughter is also asking that  he be put on 6 units of long acting insulin.  Page sent to Dr OLGA Coles. Will hold Eliquis and start 6 Units DEgludec q HS as ordered.

## 2024-03-30 NOTE — PROGRESS NOTES
[facility]  Hospitalist Progress Note                                                                     [facility]   part of [facility] [place] GABE Nguyen  12/8/1932    SUBJECTIVE:    Family at bedside.      BG remain elevated.  Daughter concerned he is still aspirating w meds/applesauce.  Very little po intake.      Increased 02 requirements to 11 L down to 3 this am.      Streaky blood w BM, no melena or blood clots        OBJECTIVE:  Temp:  [96.9 °F (36.1 °C)-98.9 °F (37.2 °C)] 97.9 °F (36.6 °C)  Pulse:  [] 113  Resp:  [16-37] 37  BP: ()/(30-46) 129/32  SpO2:  [90 %-98 %] 92 %  Exam  Gen: No acute distress, alert and oriented x3, no focal neurologic deficits  Pulm: ronchi, NO wheezing.    CV: Heart with regular rate and rhythm, no murmur.  Normal PMI.    Abd: Abdomen soft, nontender, nondistended, no organomegaly, bowel sounds present  MSK: Full range of motion in extremities, no clubbing, no cyanosis  Skin: no rashes or lesions    Labs:   Recent Labs   Lab 03/26/24  0933 03/27/24  0656 03/28/24  0701 03/29/24  0411 03/30/24  0616   WBC 9.0 16.1* 14.6* 7.3 16.3*   HGB 11.7* 10.2* 10.4* 10.1* 9.0*   MCV 83.7 78.5* 79.1* 79.7* 80.4   .0 304.0 335.0 268.0 239.0   BAND  --   --   --  49  --        Recent Labs   Lab 03/26/24  0933 03/27/24  0656 03/28/24  0701 03/29/24  0411 03/30/24  0616    135* 136 139 146*   K 4.8 4.4 5.0 4.4 3.5    103 104 105 112   CO2 29.0 26.0 26.0 27.0 27.0   BUN 25* 42* 64* 73* 97*   CREATSERUM 1.82* 1.95* 2.15* 2.51* 3.14*   CA 10.0 9.7 9.8 9.8 9.5   MG  --  2.2  --   --   --    * 316* 348* 271* 272*       Recent Labs   Lab 03/26/24  0933 03/27/24  0656   ALT 18 14*   AST 18 14*   ALB 3.4 2.8*       Recent Labs   Lab 03/29/24  1114 03/29/24  1652 03/29/24  2057 03/29/24  2312 03/30/24  0554   PGLU 320* 313* 379* 278* 323*       Meds:   Scheduled:    pantoprazole  40 mg  Intravenous Q24H    insulin degludec  12 Units Subcutaneous Nightly    insulin degludec  6 Units Subcutaneous Once    initial dose (ACS/Afib) heparin  12 Units/kg/hr Intravenous Once    metoprolol  5 mg Intravenous Q6H    methylPREDNISolone  40 mg Intravenous Daily    insulin aspart  2-10 Units Subcutaneous TID AC and HS    dilTIAZem  10 mg Intravenous Once    ipratropium-albuterol  3 mL Nebulization 4 times per day    acetylcysteine  2 mL Nebulization BID    iron sucrose  200 mg Intravenous Daily    piperacillin-tazobactam  3.375 g Intravenous q12h    insulin aspart  10 Units Subcutaneous Once    [Held by provider] furosemide  40 mg Oral BID (Diuretic)    [Held by provider] predniSONE  40 mg Oral Daily with breakfast    atorvastatin  40 mg Oral Nightly    fluticasone furoate-vilanterol  1 puff Inhalation Daily    levothyroxine  50 mcg Oral Daily    metoprolol tartrate  12.5 mg Oral 2x Daily(Beta Blocker)    guaiFENesin ER  600 mg Oral BID    [Held by provider] apixaban  2.5 mg Oral BID    tamsulosin  0.4 mg Oral Nightly     Continuous Infusions:    sodium chloride      continuous dose heparin       PRN: sennosides, bisacodyl, glucose **OR** glucose **OR** glucose-vitamin C **OR** dextrose **OR** glucose **OR** glucose **OR** glucose-vitamin C, acetaminophen, alum-mag hydroxide-simethicone, acetaminophen, melatonin, ondansetron, metoclopramide, benzonatate, ipratropium-albuterol, benzocaine-menthol, acetaminophen, benzocaine-menthol    Assessment/Plan:  Principal Problem:    COPD exacerbation (HCC)  Active Problems:    Pleural effusion on right    Acute bronchiolitis due to respiratory syncytial virus (RSV)    89 year old male with PMH sig for COPD, paroxysmal atrial fibrillation, hypertension, CKD4, hyperlipidemia, diabetes mellitus type 2, history of CVA, hypothyroidism, 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  Dw daughter     Dysphagia  - make NPO, speech eval Monday for likely video  - seen by SLP earlier in admission     Essential HTN  - metoprolol, losartan on HOLD     Hyperlipidemia  - statin     Hx CVA  - asa, statin     Hypothyroidism   - sytnhroid     Constipation  - trial lactulose, bowel regimen      FEN: regular diet, PT/OT  Proph: SCDs, heparin gtt  Code status: Full code     Dispo - cont to monitor       [facility]  [facility]  Answering Service number: [phone]

## 2024-03-30 NOTE — PROGRESS NOTES
Accucheck 379.  Also, patient  is tachypneic and went from 4L  to 10L of supplemental O2/HFNC since getting up to the bedside commode.  It is taking him a long time to recover.  RT requested to come see patient. Page sent to Dr OLGA Coles.

## 2024-03-30 NOTE — PROGRESS NOTES
[facility]    Hector GABE Nguyen Patient Status:  Inpatient    1932 MRN [id number]   [facility] 5NW-A Attending Michele Chatterjee MD   Hosp Day # 1 PCP Sp Myers MD     SUBJECTIVE:  overnight, had transient desaturations requiring up to 10L. Has weak cough.     OBJECTIVE:  /32   Pulse 113   Temp 97.9 °F (36.6 °C) (Oral)   Resp (!) 37   Ht 170.2 cm (5' 7\")   Wt 137 lb (62.1 kg)   SpO2 92%   BMI 21.46 kg/m²   O2 requirement: on 3L nc     No intake/output data recorded.  No intake/output data recorded.     Current Medications:   Current Facility-Administered Medications:     pantoprazole (Protonix) injection 40 mg, 40 mg, Intravenous, Q24H    insulin degludec 100 units/mL flextouch 12 Units, 12 Units, Subcutaneous, Nightly    sodium chloride 0.45% infusion, , Intravenous, Continuous    heparin (Porcine) 27861 units/250mL infusion ACS/AFIB CONTINUOUS, 200-3,000 Units/hr, Intravenous, Continuous    metoprolol (Lopressor) 5 mg/5mL injection 5 mg, 5 mg, Intravenous, Q6H    methylPREDNISolone sodium succinate (Solu-MEDROL) injection 40 mg, 40 mg, Intravenous, Daily    insulin aspart (NovoLOG) 100 Units/mL FlexPen 2-10 Units, 2-10 Units, Subcutaneous, TID AC and HS    dilTIAZem (cardIZEM) 25 mg/5mL injection 10 mg, 10 mg, Intravenous, Once    ipratropium-albuterol (Duoneb) 0.5-2.5 (3) MG/3ML inhalation solution 3 mL, 3 mL, Nebulization, 4 times per day    acetylcysteine (Mucomyst) 20 % nebulizer solution 2 mL, 2 mL, Nebulization, BID    sennosides (Senokot) tab 8.6 mg, 8.6 mg, Oral, Daily PRN    iron sucrose (Venofer) 20 mg/mL injection 200 mg, 200 mg, Intravenous, Daily    piperacillin-tazobactam (Zosyn) 3.375 g in dextrose 5% 100 mL IVPB-ADDV, 3.375 g, Intravenous, q12h    bisacodyl (Dulcolax) 10 MG rectal suppository 10 mg, 10 mg, Rectal, Daily PRN    insulin aspart (NovoLOG) 100 Units/mL FlexPen 10 Units, 10 Units, Subcutaneous, Once    glucose (Dex4) 15 GM/59ML oral  liquid 15 g, 15 g, Oral, Q15 Min PRN **OR** glucose (Glutose) 40% oral gel 15 g, 15 g, Oral, Q15 Min PRN **OR** glucose-vitamin C (Dex-4) chewable tab 4 tablet, 4 tablet, Oral, Q15 Min PRN **OR** dextrose 50% injection 50 mL, 50 mL, Intravenous, Q15 Min PRN **OR** glucose (Dex4) 15 GM/59ML oral liquid 30 g, 30 g, Oral, Q15 Min PRN **OR** glucose (Glutose) 40% oral gel 30 g, 30 g, Oral, Q15 Min PRN **OR** glucose-vitamin C (Dex-4) chewable tab 8 tablet, 8 tablet, Oral, Q15 Min PRN    acetaminophen (Tylenol) rectal suppository 650 mg, 650 mg, Rectal, Q6H PRN    [Held by provider] furosemide (Lasix) tab 40 mg, 40 mg, Oral, BID (Diuretic)    [Held by provider] predniSONE (Deltasone) tab 40 mg, 40 mg, Oral, Daily with breakfast    alum-mag hydroxide-simethicone (Maalox) 200-200-20 MG/5ML oral suspension 30 mL, 30 mL, Oral, QID PRN    atorvastatin (Lipitor) tab 40 mg, 40 mg, Oral, Nightly    fluticasone furoate-vilanterol (Breo Ellipta) 200-25 MCG/ACT inhaler 1 puff, 1 puff, Inhalation, Daily    levothyroxine (Synthroid) tab 50 mcg, 50 mcg, Oral, Daily    metoprolol tartrate (Lopressor) partial tab 12.5 mg, 12.5 mg, Oral, 2x Daily(Beta Blocker)    acetaminophen (Tylenol Extra Strength) tab 500 mg, 500 mg, Oral, Q4H PRN    melatonin tab 3 mg, 3 mg, Oral, Nightly PRN    ondansetron (Zofran) 4 MG/2ML injection 4 mg, 4 mg, Intravenous, Q6H PRN    metoclopramide (Reglan) 5 mg/mL injection 5 mg, 5 mg, Intravenous, Q8H PRN    benzonatate (Tessalon) cap 200 mg, 200 mg, Oral, TID PRN    guaiFENesin ER (Mucinex) 12 hr tab 600 mg, 600 mg, Oral, BID    ipratropium-albuterol (Duoneb) 0.5-2.5 (3) MG/3ML inhalation solution 3 mL, 3 mL, Nebulization, Q4H PRN    benzocaine-menthol (Cepacol) lozenge 1 lozenge, 1 lozenge, Oral, PRN    [Held by provider] apixaban (Eliquis) tab 2.5 mg, 2.5 mg, Oral, BID    tamsulosin (Flomax) cap 0.4 mg, 0.4 mg, Oral, Nightly    acetaminophen (Tylenol) tab 325-650 mg, 325-650 mg, Oral, Q6H PRN     benzocaine-menthol (Cepacol) lozenge 1 lozenge, 1 lozenge, Oral, PRN     General appearance: appears stated age, cooperative, and slowed mentation  Lungs:  coarse rhonchi bilaterally with poor air mvt  Heart: irregularly irregular rhythm  Abdomen: soft, non-tender; bowel sounds normal; no masses,  no organomegaly  Extremities: extremities normal, atraumatic, no cyanosis or edema     Lab Results   Component Value Date    WBC 16.3 03/30/2024    RBC 3.52 03/30/2024    HGB 9.0 03/30/2024    HCT 28.3 03/30/2024    MCV 80.4 03/30/2024    MCH 25.6 03/30/2024    MCHC 31.8 03/30/2024    RDW 14.6 03/30/2024    .0 03/30/2024     Lab Results   Component Value Date     03/30/2024    K 3.5 03/30/2024     03/30/2024    CO2 27.0 03/30/2024    BUN 97 03/30/2024    CREATSERUM 3.14 03/30/2024     03/30/2024    CA 9.5 03/30/2024     No results found for: \"PT\", \"INR\"       Imaging: CXR: personally reviewed. Bilateral consolidations with small to mod R effusion; mostly unchanged.     Assessment and Plan:   Acute hypoxic resp failure -- secondary to COPD exac triggered by RSV infection and now with secondary bacterial PNA.  -on escalating O2 requirements; CXR mostly unchanged.  Has significant mucous plugging and inability to clear secretions- start CPT, suctioning prn  -pulm toilet  AECOPD- secondary to RSV with secondary bacterial pneumonia.  May be due to aspiration given patient's previous poor swallow eval.  -CXR unchanged  -zosyn Day (3/29-)   -IV steroid burst; transition to IV bc of poor swallowing/dysphagia  -Breo  -BD protocol  Pleural Effusion -- chronic R effusion- unchanged  -tapped in past with trapped lung  -would not rec thora at this time  Pneumonia- likely aspiration- as above  - check MRSA swab at daughter's request  - sputum cx if possible  Abn CXR -- there is a R lateral pleural based nodular density on the CXR that does not appear to have been present in the past  -will need out patient  follow up with Dr. Jones.  Dispo -- discussed code status. Family wishes for pt to be DNAR/full. No intubation  -discussed with family at bedside.  -will follow    Ishan Kyle MD  3/30/2024  12:08 PM

## 2024-03-30 NOTE — PROGRESS NOTES
Additional 6 units of Novolog (11 units total) given as ordered to cover accucheck of 379.  Will have stat chest Xray done as ordered.

## 2024-03-30 NOTE — CONSULTS
[facility] - Nephrology  Report of Consultation    Hector Nguyen Patient Status:  Inpatient    1932 MRN [id number]   [facility] 5W-A Attending Michele Chatterjee MD   Hosp Day # 1 PCP Sp Myers MD     Reason for consult: QUINTON  Requesting physician: Michele Chatterjee MD   Date of consultation: 3/30/2024     HISTORY OF PRESENT ILLNESS:     Hector Nguyen is a 89 year old male with past medical history significant for COPD, atrial fibrillation, hypertension, CKD stage IV, type 2 diabetes, chronic right pleural effusion who presented to hospital shortness of breath.  Found to have COPD exacerbation due to RSV bronchitis and likely secondary bacterial pneumonia.  Patient has been on treatment for this.  Today nephrologist consulted for progressive QUINTON and hyponatremia.  On my visit patient has minimal to no intake.  He is essentially n.p.o. due to aspiration precautions.  He has no lower extremity edema.  Notes reduction in urine output over the last 24 hours.    REVIEW OF SYSTEMS:     Please see HPI for pertinent positives. 10 point review of systems otherwise reviewed and negative.     HISTORY:     Past Medical History:   Diagnosis Date    Abnormal CXR 3/30/2015    Arthritis of both knees 2015    Advanced, xrays 2013    Asthma (HCC)     Diabetes (HCC)     Diabetes mellitus (HCC) 2015    Disorder of thyroid     High blood pressure     High cholesterol     HTN (hypertension), benign 2015    Hydropneumothorax 2022    Hyperlipidemia 2015    Hypothyroidism due to acquired atrophy of thyroid 2015    Ischemic stroke (HCC)     LVH (left ventricular hypertrophy) 2015    Osteoarthritis     Shortness of breath     Solitary pulmonary nodule on lung CT 2015    A 3.5 mm nodular opacity in the caudal and posterior lateral corner of the posterior segment of the right upper lobe (image 113 of series 3) represents a nonspecific finding.      History reviewed. No pertinent surgical history.  No family history on file.   reports that he has never smoked. He has never been exposed to tobacco smoke. He has never used smokeless tobacco. He reports that he does not drink alcohol and does not use drugs.    ALLERGIES:     No Known Allergies    MEDICATIONS:       Current Facility-Administered Medications:     pantoprazole (Protonix) injection 40 mg, 40 mg, Intravenous, Q24H    insulin degludec 100 units/mL flextouch 12 Units, 12 Units, Subcutaneous, Nightly    heparin (Porcine) 69195 units/250mL infusion ACS/AFIB CONTINUOUS, 200-3,000 Units/hr, Intravenous, Continuous    metoprolol (Lopressor) 5 mg/5mL injection 5 mg, 5 mg, Intravenous, Q6H    methylPREDNISolone sodium succinate (Solu-MEDROL) injection 40 mg, 40 mg, Intravenous, Daily    insulin aspart (NovoLOG) 100 Units/mL FlexPen 2-10 Units, 2-10 Units, Subcutaneous, TID AC and HS    sodium chloride 0.9% infusion, , Intravenous, Continuous    dilTIAZem (cardIZEM) 25 mg/5mL injection 10 mg, 10 mg, Intravenous, Once    ipratropium-albuterol (Duoneb) 0.5-2.5 (3) MG/3ML inhalation solution 3 mL, 3 mL, Nebulization, 4 times per day    acetylcysteine (Mucomyst) 20 % nebulizer solution 2 mL, 2 mL, Nebulization, BID    sennosides (Senokot) tab 8.6 mg, 8.6 mg, Oral, Daily PRN    iron sucrose (Venofer) 20 mg/mL injection 200 mg, 200 mg, Intravenous, Daily    piperacillin-tazobactam (Zosyn) 3.375 g in dextrose 5% 100 mL IVPB-ADDV, 3.375 g, Intravenous, q12h    bisacodyl (Dulcolax) 10 MG rectal suppository 10 mg, 10 mg, Rectal, Daily PRN    insulin aspart (NovoLOG) 100 Units/mL FlexPen 10 Units, 10 Units, Subcutaneous, Once    glucose (Dex4) 15 GM/59ML oral liquid 15 g, 15 g, Oral, Q15 Min PRN **OR** glucose (Glutose) 40% oral gel 15 g, 15 g, Oral, Q15 Min PRN **OR** glucose-vitamin C (Dex-4) chewable tab 4 tablet, 4 tablet, Oral, Q15 Min PRN **OR** dextrose 50% injection 50 mL, 50 mL, Intravenous, Q15 Min PRN **OR**  glucose (Dex4) 15 GM/59ML oral liquid 30 g, 30 g, Oral, Q15 Min PRN **OR** glucose (Glutose) 40% oral gel 30 g, 30 g, Oral, Q15 Min PRN **OR** glucose-vitamin C (Dex-4) chewable tab 8 tablet, 8 tablet, Oral, Q15 Min PRN    acetaminophen (Tylenol) rectal suppository 650 mg, 650 mg, Rectal, Q6H PRN    [Held by provider] predniSONE (Deltasone) tab 40 mg, 40 mg, Oral, Daily with breakfast    alum-mag hydroxide-simethicone (Maalox) 200-200-20 MG/5ML oral suspension 30 mL, 30 mL, Oral, QID PRN    atorvastatin (Lipitor) tab 40 mg, 40 mg, Oral, Nightly    fluticasone furoate-vilanterol (Breo Ellipta) 200-25 MCG/ACT inhaler 1 puff, 1 puff, Inhalation, Daily    levothyroxine (Synthroid) tab 50 mcg, 50 mcg, Oral, Daily    metoprolol tartrate (Lopressor) partial tab 12.5 mg, 12.5 mg, Oral, 2x Daily(Beta Blocker)    acetaminophen (Tylenol Extra Strength) tab 500 mg, 500 mg, Oral, Q4H PRN    melatonin tab 3 mg, 3 mg, Oral, Nightly PRN    ondansetron (Zofran) 4 MG/2ML injection 4 mg, 4 mg, Intravenous, Q6H PRN    metoclopramide (Reglan) 5 mg/mL injection 5 mg, 5 mg, Intravenous, Q8H PRN    benzonatate (Tessalon) cap 200 mg, 200 mg, Oral, TID PRN    guaiFENesin ER (Mucinex) 12 hr tab 600 mg, 600 mg, Oral, BID    ipratropium-albuterol (Duoneb) 0.5-2.5 (3) MG/3ML inhalation solution 3 mL, 3 mL, Nebulization, Q4H PRN    benzocaine-menthol (Cepacol) lozenge 1 lozenge, 1 lozenge, Oral, PRN    [Held by provider] apixaban (Eliquis) tab 2.5 mg, 2.5 mg, Oral, BID    tamsulosin (Flomax) cap 0.4 mg, 0.4 mg, Oral, Nightly    acetaminophen (Tylenol) tab 325-650 mg, 325-650 mg, Oral, Q6H PRN    benzocaine-menthol (Cepacol) lozenge 1 lozenge, 1 lozenge, Oral, PRN    Medications Prior to Admission   Medication Sig Dispense Refill Last Dose    acetaminophen 325 MG Oral Tab Take 1-2 tablets (325-650 mg total) by mouth every 6 (six) hours as needed for Pain.   3/25/2024    furosemide 40 MG Oral Tab Take 1 tablet (40 mg total) by mouth 2 (two) times  daily.   3/25/2024 at 1400    apixaban 2.5 MG Oral Tab Take 1 tablet (2.5 mg total) by mouth 2 (two) times daily.   3/25/2024 at 1900    tamsulosin 0.4 MG Oral Cap Take 1 capsule (0.4 mg total) by mouth daily.   3/25/2024 at 1900    BREO ELLIPTA 200-25 MCG/INH Inhalation Aerosol Powder, Breath Activated Inhale 1 puff into the lungs daily. Use one puff daily. 2 each 0 3/25/2024 at 0700    atorvastatin 40 MG Oral Tab Take 1 tablet (40 mg total) by mouth nightly. 90 tablet 0 3/25/2024 at 2000    metoprolol tartrate 25 MG Oral Tab Take 0.5 tablets (12.5 mg total) by mouth 2x Daily(Beta Blocker). 60 tablet 0 3/25/2024 at 1900    levothyroxine 50 MCG Oral Tab Take 1 tablet (50 mcg total) by mouth daily. 30 tablet 0 3/25/2024 at 0700    Potassium Chloride ER 20 MEQ Oral Tab CR TAKE 1 TABLET BY MOUTH DAILY WHEN USING LASIX (Patient taking differently: Take 1 tablet by mouth twice a week. TAKE 1 TABLET BY MOUTH DAILY WHEN USING LASIX) 90 tablet 0 3/25/2024 at 0700    Omeprazole 40 MG Oral Capsule Delayed Release Take 1 capsule (40 mg total) by mouth daily. Before meal 90 capsule 3 3/25/2024 at 0700    losartan 100 MG Oral Tab Take 1 tablet (100 mg total) by mouth daily. 90 tablet 2 3/25/2024 at 0700          PHYSICAL EXAM:     Vital Signs: /69   Pulse 106   Temp 98.7 °F (37.1 °C) (Oral)   Resp 18   Ht 5' 7\" (1.702 m)   Wt 137 lb (62.1 kg)   SpO2 93%   BMI 21.46 kg/m²   Temp (24hrs), Av °F (36.7 °C), Min:96.9 °F (36.1 °C), Max:98.7 °F (37.1 °C)     No intake or output data in the 24 hours ending 24 1425  Wt Readings from Last 3 Encounters:   24 137 lb (62.1 kg)   22 133 lb (60.3 kg)   22 133 lb 2.5 oz (60.4 kg)       General: NAD  HEENT: NCAT  Neck: Supple  Cardiac: Regular rate and rhythm, no murmurs  Lungs: CTAB, no crackles   Abdomen: Soft, non-tender, non-distended  Extremities: no LE edema   Neurologic/Psych: mentating well    LABORATORY DATA:       Lab Results   Component Value  Date     (H) 03/30/2024    BUN 97 (H) 03/30/2024    BUNCREA 18.0 02/10/2022    CREATSERUM 3.14 (H) 03/30/2024    ANIONGAP 7 03/30/2024    GFRNAA 39 (L) 02/24/2022    GFRAA 45 (L) 02/24/2022    CA 9.5 03/30/2024    OSMOCALC 342 (H) 03/30/2024    ALKPHO 84 03/27/2024    AST 14 (L) 03/27/2024    ALT 14 (L) 03/27/2024    BILT 0.5 03/27/2024    TP 7.3 03/27/2024    ALB 2.8 (L) 03/27/2024    GLOBULIN 4.5 (H) 03/27/2024     (H) 03/30/2024    K 3.5 03/30/2024     03/30/2024    CO2 27.0 03/30/2024     Lab Results   Component Value Date    WBC 16.3 (H) 03/30/2024    RBC 3.52 (L) 03/30/2024    HGB 9.0 (L) 03/30/2024    HCT 28.3 (L) 03/30/2024    .0 03/30/2024    MCV 80.4 03/30/2024    MCH 25.6 (L) 03/30/2024    MCHC 31.8 03/30/2024    RDW 14.6 03/30/2024    NEPRELIM 14.87 (H) 03/30/2024    NEUTABS 15.00 (H) 03/30/2024    LYMPHABS 0.65 (L) 03/30/2024    EOSABS 0.33 03/30/2024    BASABS 0.00 03/30/2024    NEUT 48 03/30/2024    LYMPH 4 03/30/2024    MON 2 03/30/2024    EOS 2 03/30/2024    BASO 0 03/30/2024    NEPERCENT 87.9 03/28/2024    LYPERCENT 1.9 03/28/2024    MOPERCENT 9.7 03/28/2024    EOPERCENT 0.0 03/28/2024    BAPERCENT 0.1 03/28/2024    NE 12.81 (H) 03/28/2024    LYMABS 0.27 (L) 03/28/2024    MOABSO 1.41 (H) 03/28/2024    EOABSO 0.00 03/28/2024    BAABSO 0.02 03/28/2024     Lab Results   Component Value Date    MALBP 13.3 01/26/2022    CREUR 50.10 01/26/2022     Lab Results   Component Value Date    COLORUR Yellow 02/22/2022    CLARITY Clear 02/22/2022    SPECGRAVITY 1.015 02/22/2022    GLUUR >=500 (A) 02/22/2022    BILUR Negative 02/22/2022    KETUR 20 (A) 02/22/2022    BLOODURINE Moderate (A) 02/22/2022    PHURINE 7.0 02/22/2022    PROUR 30 (A) 02/22/2022    UROBILINOGEN <2.0 02/22/2022    NITRITE Negative 02/22/2022    LEUUR Negative 02/22/2022    WBCUR 1-5 02/22/2022    RBCUR 0-2 02/22/2022    EPIUR None Seen 02/22/2022    BACUR None Seen 02/22/2022         IMPRESSION/RECOMMENDATIONS:      QUINTON  -Likely prerenal due to volume depletion  -No recent IV contrast  Hypernatremia  -SNa corrects to 149 meq/L for hyperglycemia   Anemia  -Iron deficient  Hypertension  Diastolic CHF  -PTA regimen: Lasix 40 twice daily, metoprolol 12.5 mg twice daily, losartan 100 mg daily  COPD exacerbation with RSV bronchitis      Check urine electrolytes.  Check PVR.  Give 0.9 NS to help restore the intravascular volume given worsening QUINTON.  0.45 NS after above for maintenance IV fluid.  Encourage oral intake.  IV iron  Hold BP Rx  Compensated  Pulmonary following      Zaid Luis DO   [facility]   Nephrology

## 2024-03-30 NOTE — PROGRESS NOTES
Will hold Lopressor and discontinue IVF as ordered.  Patient still with shallow, labored breathing.  Saturating in the mid 90's on O2 at 10L/HFNC.  His family is worried and asking that he be seen by the Hospitalist.  Informed them that the [facility] Hospitalist is not in house.  They then asked if they can speak with them over the phone.  Dr OLGA Coles was notified,  but she stated [facility]  [facility] and couldn't speak with family at this time.  She will have her partner come see patient first thing in the morning.

## 2024-03-30 NOTE — PROGRESS NOTES
Anabella Fernando (Pulmonary) was paged and notified of patient's increase in O2 requirement overnight.  Chest xray result was relayed to him as well.

## 2024-03-30 NOTE — PHYSICAL THERAPY NOTE
Attempted to see Pt this AM - RN aware of attempt.  RN requesting PT hold today.  Will continue to follow.

## 2024-03-30 NOTE — PLAN OF CARE
Problem: RESPIRATORY - ADULT  Goal: Achieves optimal ventilation and oxygenation  Description: INTERVENTIONS:  - Assess for changes in respiratory status  - Assess for changes in mentation and behavior  - Position to facilitate oxygenation and minimize respiratory effort  - Oxygen supplementation based on oxygen saturation or ABGs  - Provide Smoking Cessation handout, if applicable  - Encourage broncho-pulmonary hygiene including cough, deep breathe, Incentive Spirometry  - Assess the need for suctioning and perform as needed  - Assess and instruct to report SOB or any respiratory difficulty  - Respiratory Therapy support as indicated  - Manage/alleviate anxiety  - Monitor for signs/symptoms of CO2 retention  Outcome: Progressing     Problem: Patient/Family Goals  Goal: Patient/Family Long Term Goal  Description: Patient's Long Term Goal: discharge home    Interventions:  - See additional Care Plan goals for specific interventions  Outcome: Progressing  Goal: Patient/Family Short Term Goal  Description: Patient's Short Term Goal:   3/28: Maintain safety  3/29: maintain safety  03/29/2024 - Blood sugar control  Interventions:   - insulin as ordered  - See additional Care Plan goals for specific interventions  Outcome: Progressing

## 2024-03-31 LAB
ALBUMIN SERPL-MCNC: 2.2 G/DL (ref 3.4–5)
ALBUMIN/GLOB SERPL: 0.4 {RATIO} (ref 1–2)
ALP LIVER SERPL-CCNC: 111 U/L
ALT SERPL-CCNC: 42 U/L
ANION GAP SERPL CALC-SCNC: 5 MMOL/L (ref 0–18)
ANION GAP SERPL CALC-SCNC: 9 MMOL/L (ref 0–18)
APTT PPP: 67.3 SECONDS (ref 23.3–35.6)
AST SERPL-CCNC: 21 U/L (ref 15–37)
ATRIAL RATE: 113 BPM
ATRIAL RATE: 133 BPM
ATRIAL RATE: 85 BPM
BILIRUB SERPL-MCNC: 0.9 MG/DL (ref 0.1–2)
BUN BLD-MCNC: 91 MG/DL (ref 9–23)
BUN BLD-MCNC: 96 MG/DL (ref 9–23)
CALCIUM BLD-MCNC: 9.3 MG/DL (ref 8.5–10.1)
CALCIUM BLD-MCNC: 9.6 MG/DL (ref 8.5–10.1)
CHLORIDE SERPL-SCNC: 115 MMOL/L (ref 98–112)
CHLORIDE SERPL-SCNC: 119 MMOL/L (ref 98–112)
CO2 SERPL-SCNC: 25 MMOL/L (ref 21–32)
CO2 SERPL-SCNC: 26 MMOL/L (ref 21–32)
CREAT BLD-MCNC: 2.51 MG/DL
CREAT BLD-MCNC: 2.71 MG/DL
EGFRCR SERPLBLD CKD-EPI 2021: 21 ML/MIN/1.73M2 (ref 60–?)
EGFRCR SERPLBLD CKD-EPI 2021: 24 ML/MIN/1.73M2 (ref 60–?)
ERYTHROCYTE [DISTWIDTH] IN BLOOD BY AUTOMATED COUNT: 15 %
GLOBULIN PLAS-MCNC: 5.1 G/DL (ref 2.8–4.4)
GLUCOSE BLD-MCNC: 226 MG/DL (ref 70–99)
GLUCOSE BLD-MCNC: 229 MG/DL (ref 70–99)
GLUCOSE BLD-MCNC: 230 MG/DL (ref 70–99)
GLUCOSE BLD-MCNC: 302 MG/DL (ref 70–99)
GLUCOSE BLD-MCNC: 316 MG/DL (ref 70–99)
GLUCOSE BLD-MCNC: 324 MG/DL (ref 70–99)
HCT VFR BLD AUTO: 29.6 %
HGB BLD-MCNC: 9.6 G/DL
MCH RBC QN AUTO: 26.4 PG (ref 26–34)
MCHC RBC AUTO-ENTMCNC: 32.4 G/DL (ref 31–37)
MCV RBC AUTO: 81.3 FL
OSMOLALITY SERPL CALC.SUM OF ELEC: 347 MOSM/KG (ref 275–295)
OSMOLALITY SERPL CALC.SUM OF ELEC: 349 MOSM/KG (ref 275–295)
P AXIS: 105 DEGREES
P-R INTERVAL: 256 MS
PLATELET # BLD AUTO: 240 10(3)UL (ref 150–450)
POTASSIUM SERPL-SCNC: 2.9 MMOL/L (ref 3.5–5.1)
POTASSIUM SERPL-SCNC: 3.4 MMOL/L (ref 3.5–5.1)
PROT SERPL-MCNC: 7.3 G/DL (ref 6.4–8.2)
Q-T INTERVAL: 300 MS
Q-T INTERVAL: 336 MS
Q-T INTERVAL: 402 MS
QRS DURATION: 80 MS
QRS DURATION: 88 MS
QRS DURATION: 94 MS
QTC CALCULATION (BEZET): 434 MS
QTC CALCULATION (BEZET): 477 MS
QTC CALCULATION (BEZET): 478 MS
R AXIS: -38 DEGREES
R AXIS: -53 DEGREES
R AXIS: -56 DEGREES
RBC # BLD AUTO: 3.64 X10(6)UL
SODIUM SERPL-SCNC: 149 MMOL/L (ref 136–145)
SODIUM SERPL-SCNC: 150 MMOL/L (ref 136–145)
T AXIS: 53 DEGREES
T AXIS: 79 DEGREES
T AXIS: 94 DEGREES
VENTRICULAR RATE: 121 BPM
VENTRICULAR RATE: 126 BPM
VENTRICULAR RATE: 85 BPM
WBC # BLD AUTO: 21.9 X10(3) UL (ref 4–11)

## 2024-03-31 PROCEDURE — C9113 INJ PANTOPRAZOLE SODIUM, VIA: HCPCS | Performed by: HOSPITALIST

## 2024-03-31 PROCEDURE — 85027 COMPLETE CBC AUTOMATED: CPT | Performed by: INTERNAL MEDICINE

## 2024-03-31 PROCEDURE — 94640 AIRWAY INHALATION TREATMENT: CPT

## 2024-03-31 PROCEDURE — 80053 COMPREHEN METABOLIC PANEL: CPT | Performed by: INTERNAL MEDICINE

## 2024-03-31 PROCEDURE — 97530 THERAPEUTIC ACTIVITIES: CPT

## 2024-03-31 PROCEDURE — 92526 ORAL FUNCTION THERAPY: CPT

## 2024-03-31 PROCEDURE — 94667 MNPJ CHEST WALL 1ST: CPT

## 2024-03-31 PROCEDURE — 85730 THROMBOPLASTIN TIME PARTIAL: CPT | Performed by: INTERNAL MEDICINE

## 2024-03-31 PROCEDURE — 97116 GAIT TRAINING THERAPY: CPT

## 2024-03-31 PROCEDURE — 92610 EVALUATE SWALLOWING FUNCTION: CPT

## 2024-03-31 PROCEDURE — 80048 BASIC METABOLIC PNL TOTAL CA: CPT | Performed by: INTERNAL MEDICINE

## 2024-03-31 PROCEDURE — 82962 GLUCOSE BLOOD TEST: CPT

## 2024-03-31 PROCEDURE — 93005 ELECTROCARDIOGRAM TRACING: CPT

## 2024-03-31 PROCEDURE — 93010 ELECTROCARDIOGRAM REPORT: CPT | Performed by: INTERNAL MEDICINE

## 2024-03-31 RX ORDER — POTASSIUM CHLORIDE 14.9 MG/ML
20 INJECTION INTRAVENOUS ONCE
Status: COMPLETED | OUTPATIENT
Start: 2024-03-31 | End: 2024-04-01

## 2024-03-31 RX ORDER — DEXTROSE MONOHYDRATE 50 MG/ML
INJECTION, SOLUTION INTRAVENOUS CONTINUOUS
Status: DISCONTINUED | OUTPATIENT
Start: 2024-03-31 | End: 2024-04-03

## 2024-03-31 RX ORDER — SODIUM CHLORIDE FOR INHALATION 7 %
4 VIAL, NEBULIZER (ML) INHALATION EVERY 6 HOURS
Status: DISCONTINUED | OUTPATIENT
Start: 2024-03-31 | End: 2024-04-01

## 2024-03-31 RX ORDER — ECHINACEA PURPUREA EXTRACT 125 MG
1 TABLET ORAL
Status: DISCONTINUED | OUTPATIENT
Start: 2024-03-31 | End: 2024-04-06

## 2024-03-31 RX ORDER — INSULIN DEGLUDEC 100 U/ML
15 INJECTION, SOLUTION SUBCUTANEOUS NIGHTLY
Status: DISCONTINUED | OUTPATIENT
Start: 2024-03-31 | End: 2024-04-01

## 2024-03-31 NOTE — SLP NOTE
ADULT SWALLOWING EVALUATION    ASSESSMENT    ASSESSMENT/OVERALL IMPRESSION:  Patient is a 89 y/o male admitted with cough and difficulty breathing; tested positive for RSV along with new diagnosis of PNA 3/28/24 . PMHx significant for COPD, afib, HTN, CKD, HLD, DM-2, and prior CVA. Pt known to this service 3/27/24 for this current admission and BSSE recommendations were for a soft and easy to chew diet and thin liquids. SLP order received to reevaluate oropharyngeal swallow d/t concern for aspiration. Patient received alert in bedside chair with family present. Pt's family stated that he has exhibited occasional s/s of aspiration for the past year with thin liquids. Pt's family also reported 5 days ago on 3/26/24 pt consumed PO w/o increased s/s of aspiration. Patient with limited dentition and chooses softer items to eat at baseline.      Patient presented with suspected mild oropharyngeal dysphagia. Bolus acceptance was intact w/o evidence of anterior bolus loss. Mastication and AP transit were prolonged, resulting in min oral residue. Liquid wash effective in clearing residue. Hyolaryngeal excursion was intact per palpation. Frequent immediate coughing observed across trials.     Recommend NPO status d/t frequent s/s of aspiration with all PO intake and worsening pulmonary status. SLP will follow up to administer VFSS for further objective assessment of oropharyngeal swallow function and determine LRD. Education provided re: results and recommendations.          RECOMMENDATIONS   Diet Recommendations - Solids: NPO  Diet Recommendations - Liquids: NPO                        Compensatory Strategies Recommended: Small bites and sips  Aspiration Precautions: Upright position  Medication Administration Recommendations: One pill at a time (w/ puree prn)  Treatment Plan/Recommendations: Videofluoroscopic swallow study    HISTORY   MEDICAL HISTORY  Reason for Referral: R/O aspiration    Problem List  Principal Problem:     COPD exacerbation (HCC)  Active Problems:    Pleural effusion on right    Acute bronchiolitis due to respiratory syncytial virus (RSV)      Past Medical History  Past Medical History:   Diagnosis Date    Abnormal CXR 3/30/2015    Arthritis of both knees 2/27/2015    Advanced, xrays 2013    Asthma (HCC)     Diabetes (HCC)     Diabetes mellitus (HCC) 2/27/2015    Disorder of thyroid     High blood pressure     High cholesterol     HTN (hypertension), benign 2/27/2015    Hydropneumothorax 2/17/2022    Hyperlipidemia 2/27/2015    Hypothyroidism due to acquired atrophy of thyroid 4/6/2015    Ischemic stroke (HCC)     LVH (left ventricular hypertrophy) 2/27/2015    Osteoarthritis     Shortness of breath     Solitary pulmonary nodule on lung CT 5/1/2015    A 3.5 mm nodular opacity in the caudal and posterior lateral corner of the posterior segment of the right upper lobe (image 113 of series 3) represents a nonspecific finding.          Diet Prior to Admission: Soft/ Easy to Chew;Thin liquids  Precautions: Aspiration    Patient/Family Goals: none    SWALLOWING HISTORY  Current Diet Consistency: NPO  Dysphagia History: see above  Imaging Results:         Impression   CONCLUSION:  No significant interval change compared to 3/28/2024.  Bilateral compliant opacities and moderate right pleural effusion are again identified.  These could reflect areas of multifocal pneumonia.  There is also a 19 mm nodular opacity in the  lateral right mid lung field which will require radiographic follow-up to evaluate for lung malignancy.  Other etiologies are also within the differential.        LOCATION:  [facility]                 Dictated by (CST): Keven Green MD on 3/29/2024 at 10:08 PM      Finalized by (CST): Keven Green MD on 3/29/2024 at 10:11 PM       SUBJECTIVE       OBJECTIVE   ORAL MOTOR EXAMINATION  Dentition: Functional  Symmetry: Within Functional Limits  Strength: Within Functional Limits  Tone: Within Functional  Limits  Range of Motion: Within Functional Limits  Rate of Motion: Within Functional Limits    Voice Quality: Weak  Respiratory Status: Nasal cannula (3L NC)  Consistencies Trialed: Thin liquids;Nectar thick liquids;Puree;Soft solid;Hard solid  Method of Presentation: Staff/Clinician assistance  Patient Positioning: Upright;Midline    Oral Phase of Swallow: Impaired  Bolus Retrieval: Intact  Bilabial Seal: Intact  Bolus Formation: Impaired  Bolus Propulsion: Impaired  Mastication: Impaired  Retention: Impaired    Pharyngeal Phase of Swallow: Impaired  Laryngeal Elevation Timing: Appears impaired  Laryngeal Elevation Strength: Appears impaired  Laryngeal Elevation Coordination: Appears impaired (Pharyngeal phase cannot be formally assessed w/o VFSS)  (Please note: Silent aspiration cannot be evaluated clinically. Videofluoroscopic Swallow Study is required to rule-out silent aspiration.)    Esophageal Phase of Swallow: No complaints consistent with possible esophageal involvement  Comments: none              GOALS  Goal #1 Pt will complete VFSS.   In Progress   FOLLOW UP  Treatment Plan/Recommendations: Videofluoroscopic swallow study     Follow Up Needed (Documentation Required): Yes  SLP Follow-up Date: 04/01/24    Thank you for your referral.   If you have any questions, please contact JULIETA Briceño

## 2024-03-31 NOTE — PHYSICAL THERAPY NOTE
PHYSICAL THERAPY TREATMENT NOTE - INPATIENT    Room Number: 531/531-A     Session: 1     Number of Visits to Meet Established Goals: 5    Presenting Problem: SOB, COPD, RSV  Co-Morbidities : DM, A-fib, hx CVA    ASSESSMENT   Patient demonstrates fair progress this session, goals  remain in progress.    Patient continues to function below baseline with transfers, gait, and standing prolonged periods.  Contributing factors to remaining limitations include decreased functional strength, decreased endurance/aerobic capacity, impaired standing  balance, and decreased muscular endurance.  Next session anticipate patient to progress transfers, gait, and standing prolonged periods.  Physical Therapy will continue to follow patient for duration of hospitalization.    Patient continues to benefit from continued skilled PT services: at discharge to promote functional independence and safety with additional support and return home with home health PT.    PLAN  PT Treatment Plan: Energy conservation;Gait training;Bed mobility;Endurance;Strengthening;Transfer training  Rehab Potential : Fair  Frequency (Obs): 3-5x/week    CURRENT GOALS       Goal #1 Patient is able to demonstrate supine - sit EOB @ level: modified independent      Goal #2 Patient is able to demonstrate transfers EOB to/from Chair/Wheelchair at assistance level: modified independent      Goal #3 Patient is able to ambulate 50 feet with assist device: walker - rolling at assistance level: supervision      Goal #4 Pt will negotiate one flight of stairs with railing with cga   Goal #5     Goal #6         3/31/2024 all goals ongoing     SUBJECTIVE  \" I feel short of breath\" - per daughter interpreting     OBJECTIVE  Precautions: Bed/chair alarm    WEIGHT BEARING RESTRICTION  Weight Bearing Restriction: None                PAIN ASSESSMENT   Ratin  Location: L knee  Management Techniques: Activity promotion;Body mechanics;Repositioning    BALANCE                                                                                                                        Static Sitting: Fair +  Dynamic Sitting: Fair           Static Standing: Fair  Dynamic Standing: Fair -    ACTIVITY TOLERANCE                         O2 WALK  Oxygen Therapy  SPO2% Ambulation on Oxygen: 90      AM-PAC '6-Clicks' INPATIENT SHORT FORM - BASIC MOBILITY  How much difficulty does the patient currently have...  Patient Difficulty: Turning over in bed (including adjusting bedclothes, sheets and blankets)?: A Little   Patient Difficulty: Sitting down on and standing up from a chair with arms (e.g., wheelchair, bedside commode, etc.): A Little   Patient Difficulty: Moving from lying on back to sitting on the side of the bed?: A Little   How much help from another person does the patient currently need...   Help from Another: Moving to and from a bed to a chair (including a wheelchair)?: A Little   Help from Another: Need to walk in hospital room?: A Little   Help from Another: Climbing 3-5 steps with a railing?: None       AM-PAC Score:  Raw Score: 19   Approx Degree of Impairment: 41.77%   Standardized Score (AM-PAC Scale): 45.44   CMS Modifier (G-Code): CK    FUNCTIONAL ABILITY STATUS  Gait Assessment   Functional Mobility/Gait Assessment  Gait Assistance: Contact guard assist;Supervision  Distance (ft): 4  Assistive Device: Rolling walker  Pattern: Shuffle    Skilled Therapy Provided    Bed Mobility:  Rolling: NT   Supine<>Sit: NT   Sit<>Supine: NT     Transfer Mobility:  Sit<>Stand: SBA   Stand<>Sit: SBA   Gait: 4 feet using a walker.slow kevin, decreased HS and o    Therapist's Comments: patient demonstarted impaired activity tolerance with periods of SOB and slight desat from 93 to 905 at 2 lpm. Daughter at bedside requesting minimal activity with PT today due to fatigability and SOB. RN aware of outcome of session.         Patient End of Session: Needs met;Up in chair;Call light within reach;RN aware  of session/findings;All patient questions and concerns addressed;Family present    PT Session Time: 25 minutes  Gait Training: 10 minutes  Therapeutic Activity: 15 minutes  Therapeutic Exercise:  minutes   Neuromuscular Re-education:  minutes

## 2024-03-31 NOTE — PLAN OF CARE
Patient alert and oriented x4, primarily Kiswahili speaking, family refused use of  by this writer   No c/o pain, on 3L NC   Afib/heart block on tele monitor, peripheral pulses palpable, no edema present, on Heparin gtt   Patient c/o constipation even after small BM, suppository given, BM after suppository, first stool w/ blood streaks   SLP assessed patient today, patient did not pass, video swallow study to be done tomorrow   Patient on Zosyn and Solu Medrol, 0.9 NS discontinued and D5 ordered at 83 mL/hr   QID accucheck   POC discussed w/ patient and patients family   Safety precautions in place     /56 (BP Location: Right arm)   Pulse 78   Temp 97.8 °F (36.6 °C) (Oral)   Resp 20   Ht 170.2 cm (5' 7\")   Wt 62.1 kg (137 lb)   SpO2 100%   BMI 21.46 kg/m²          Problem: RESPIRATORY - ADULT  Goal: Achieves optimal ventilation and oxygenation  Description: INTERVENTIONS:  - Assess for changes in respiratory status  - Assess for changes in mentation and behavior  - Position to facilitate oxygenation and minimize respiratory effort  - Oxygen supplementation based on oxygen saturation or ABGs  - Provide Smoking Cessation handout, if applicable  - Encourage broncho-pulmonary hygiene including cough, deep breathe, Incentive Spirometry  - Assess the need for suctioning and perform as needed  - Assess and instruct to report SOB or any respiratory difficulty  - Respiratory Therapy support as indicated  - Manage/alleviate anxiety  - Monitor for signs/symptoms of CO2 retention  Outcome: Progressing     Problem: Patient/Family Goals  Goal: Patient/Family Long Term Goal  Description: Patient's Long Term Goal: discharge home    Interventions:  - See additional Care Plan goals for specific interventions  Outcome: Progressing  Goal: Patient/Family Short Term Goal  Description: Patient's Short Term Goal:   3/28: Maintain safety  3/29: maintain safety  03/29/2024 - Blood sugar control  3/30 AM - Wean O2  3/30  noc: decrease sob  Interventions:   - insulin as ordered  - supp O2, nebs, Flutter  - See additional Care Plan goals for specific interventions  Outcome: Progressing

## 2024-03-31 NOTE — PROGRESS NOTES
[facility] GABE Nguyen Patient Status:  Inpatient    1932 MRN [id number]   Location [facility] 5NW-A Attending Michele Chatterjee MD   Hosp Day # 2 PCP Sp Myers MD     SUBJECTIVE:no new events; feels better today after getting hydration    OBJECTIVE:  /44 (BP Location: Right arm)   Pulse 89   Temp 97.8 °F (36.6 °C) (Oral)   Resp 18   Ht 170.2 cm (5' 7\")   Wt 137 lb (62.1 kg)   SpO2 95%   BMI 21.46 kg/m²   O2 requirement: on 3-l nc     I/O last 3 completed shifts:  In: 1565.9 [I.V.:1565.9]  Out: 400 [Urine:400]  I/O this shift:  In: 1303 [I.V.:1303]  Out: -      Current Medications:   Current Facility-Administered Medications:     sodium chloride (Saline Mist) 0.65 % nasal solution 1 spray, 1 spray, Each Nare, Q3H PRN    insulin degludec 100 units/mL flextouch 15 Units, 15 Units, Subcutaneous, Nightly    pantoprazole (Protonix) injection 40 mg, 40 mg, Intravenous, Q24H    heparin (Porcine) 98255 units/250mL infusion ACS/AFIB CONTINUOUS, 200-3,000 Units/hr, Intravenous, Continuous    metoprolol (Lopressor) 5 mg/5mL injection 5 mg, 5 mg, Intravenous, Q6H    methylPREDNISolone sodium succinate (Solu-MEDROL) injection 40 mg, 40 mg, Intravenous, Daily    insulin aspart (NovoLOG) 100 Units/mL FlexPen 2-10 Units, 2-10 Units, Subcutaneous, TID AC and HS    sodium chloride 0.9% infusion, , Intravenous, Continuous    dilTIAZem (cardIZEM) 25 mg/5mL injection 10 mg, 10 mg, Intravenous, Once    ipratropium-albuterol (Duoneb) 0.5-2.5 (3) MG/3ML inhalation solution 3 mL, 3 mL, Nebulization, 4 times per day    acetylcysteine (Mucomyst) 20 % nebulizer solution 2 mL, 2 mL, Nebulization, BID    sennosides (Senokot) tab 8.6 mg, 8.6 mg, Oral, Daily PRN    iron sucrose (Venofer) 20 mg/mL injection 200 mg, 200 mg, Intravenous, Daily    piperacillin-tazobactam (Zosyn) 3.375 g in dextrose 5% 100 mL IVPB-ADDV, 3.375 g, Intravenous, q12h    bisacodyl (Dulcolax) 10 MG rectal suppository 10 mg,  10 mg, Rectal, Daily PRN    insulin aspart (NovoLOG) 100 Units/mL FlexPen 10 Units, 10 Units, Subcutaneous, Once    glucose (Dex4) 15 GM/59ML oral liquid 15 g, 15 g, Oral, Q15 Min PRN **OR** glucose (Glutose) 40% oral gel 15 g, 15 g, Oral, Q15 Min PRN **OR** glucose-vitamin C (Dex-4) chewable tab 4 tablet, 4 tablet, Oral, Q15 Min PRN **OR** dextrose 50% injection 50 mL, 50 mL, Intravenous, Q15 Min PRN **OR** glucose (Dex4) 15 GM/59ML oral liquid 30 g, 30 g, Oral, Q15 Min PRN **OR** glucose (Glutose) 40% oral gel 30 g, 30 g, Oral, Q15 Min PRN **OR** glucose-vitamin C (Dex-4) chewable tab 8 tablet, 8 tablet, Oral, Q15 Min PRN    acetaminophen (Tylenol) rectal suppository 650 mg, 650 mg, Rectal, Q6H PRN    [Held by provider] predniSONE (Deltasone) tab 40 mg, 40 mg, Oral, Daily with breakfast    alum-mag hydroxide-simethicone (Maalox) 200-200-20 MG/5ML oral suspension 30 mL, 30 mL, Oral, QID PRN    atorvastatin (Lipitor) tab 40 mg, 40 mg, Oral, Nightly    fluticasone furoate-vilanterol (Breo Ellipta) 200-25 MCG/ACT inhaler 1 puff, 1 puff, Inhalation, Daily    levothyroxine (Synthroid) tab 50 mcg, 50 mcg, Oral, Daily    metoprolol tartrate (Lopressor) partial tab 12.5 mg, 12.5 mg, Oral, 2x Daily(Beta Blocker)    acetaminophen (Tylenol Extra Strength) tab 500 mg, 500 mg, Oral, Q4H PRN    melatonin tab 3 mg, 3 mg, Oral, Nightly PRN    ondansetron (Zofran) 4 MG/2ML injection 4 mg, 4 mg, Intravenous, Q6H PRN    metoclopramide (Reglan) 5 mg/mL injection 5 mg, 5 mg, Intravenous, Q8H PRN    benzonatate (Tessalon) cap 200 mg, 200 mg, Oral, TID PRN    guaiFENesin ER (Mucinex) 12 hr tab 600 mg, 600 mg, Oral, BID    ipratropium-albuterol (Duoneb) 0.5-2.5 (3) MG/3ML inhalation solution 3 mL, 3 mL, Nebulization, Q4H PRN    benzocaine-menthol (Cepacol) lozenge 1 lozenge, 1 lozenge, Oral, PRN    [Held by provider] apixaban (Eliquis) tab 2.5 mg, 2.5 mg, Oral, BID    tamsulosin (Flomax) cap 0.4 mg, 0.4 mg, Oral, Nightly    acetaminophen  (Tylenol) tab 325-650 mg, 325-650 mg, Oral, Q6H PRN    benzocaine-menthol (Cepacol) lozenge 1 lozenge, 1 lozenge, Oral, PRN     General appearance: alert, appears stated age, and cooperative  Lungs: rhonchi bilaterally  Heart: irregularly irregular rhythm  Abdomen: soft, non-tender; bowel sounds normal; no masses,  no organomegaly  Extremities: extremities normal, atraumatic, no cyanosis or edema     Lab Results   Component Value Date    WBC 21.9 03/31/2024    RBC 3.64 03/31/2024    HGB 9.6 03/31/2024    HCT 29.6 03/31/2024    MCV 81.3 03/31/2024    MCH 26.4 03/31/2024    MCHC 32.4 03/31/2024    RDW 15.0 03/31/2024    .0 03/31/2024     Lab Results   Component Value Date     03/31/2024    K 3.4 03/31/2024     03/31/2024    CO2 26.0 03/31/2024    BUN 96 03/31/2024    CREATSERUM 2.71 03/31/2024     03/31/2024    CA 9.3 03/31/2024     No results found for: \"PT\", \"INR\"       Imaging: reviewed. Per EMR     Assessment and Plan:   Acute hypoxic resp failure -- secondary to COPD exac triggered by RSV infection and now with secondary bacterial PNA.  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  May be due to aspiration given patient's previous poor swallow eval.  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 Jones.  Dysphagia- currently NPO. However, family has no intention of pursuing PEG and pt is  hungry/thirsty.  We discussed that GOC conversation should occur; if they want to provide feeding, then they need to accept possible consequence of recurrent aspiration events.  - pt is more awake and appropriate today- will get speech evaluation  Proph- on hep gtt for afib; can't reliably take oral anticoagulants currently  Dispo -- DNAR/full. No intubation  -discussed with family at bedside.  -will follow    Ishan Kyle MD  3/31/2024  8:18 AM

## 2024-03-31 NOTE — PROGRESS NOTES
Pending sale to [facility]  Hospitalist Progress Note                                                                     [facility]   part of [facility] GABE Nguyen  12/8/1932    SUBJECTIVE:    Spoke w RN yesterday afternoon, pt doing better.  More alert.      Weaned to 2 L NC.      Failed bedside swallow.  Video planned for Monday.      Family at bedside.          OBJECTIVE:  Temp:  [97.6 °F (36.4 °C)-98.7 °F (37.1 °C)] 97.8 °F (36.6 °C)  Pulse:  [] 86  Resp:  [18] 18  BP: (108-153)/(32-80) 139/39  SpO2:  [92 %-100 %] 97 %  Exam  Gen: No acute distress, alert and oriented x3, no focal neurologic deficits  Pulm: ronchi, NO wheezing.    CV: Heart with regular rate and rhythm, no murmur.  Normal PMI.    Abd: Abdomen soft, nontender, nondistended, no organomegaly, bowel sounds present  MSK: Full range of motion in extremities, no clubbing, no cyanosis  Skin: no rashes or lesions    Labs:   Recent Labs   Lab 03/26/24  0933 03/27/24  0656 03/28/24  0701 03/29/24  0411 03/30/24  0616   WBC 9.0 16.1* 14.6* 7.3 16.3*   HGB 11.7* 10.2* 10.4* 10.1* 9.0*   MCV 83.7 78.5* 79.1* 79.7* 80.4   .0 304.0 335.0 268.0 239.0   BAND  --   --   --  49 44       Recent Labs   Lab 03/26/24  0933 03/27/24  0656 03/28/24  0701 03/29/24  0411 03/30/24  0616    135* 136 139 146*   K 4.8 4.4 5.0 4.4 3.5    103 104 105 112   CO2 29.0 26.0 26.0 27.0 27.0   BUN 25* 42* 64* 73* 97*   CREATSERUM 1.82* 1.95* 2.15* 2.51* 3.14*   CA 10.0 9.7 9.8 9.8 9.5   MG  --  2.2  --   --   --    * 316* 348* 271* 272*       Recent Labs   Lab 03/26/24  0933 03/27/24  0656   ALT 18 14*   AST 18 14*   ALB 3.4 2.8*       Recent Labs   Lab 03/30/24  0554 03/30/24  1159 03/30/24  1554 03/30/24  2104 03/31/24  0507   PGLU 323* 427* 237* 279* 230*       Meds:   Scheduled:    pantoprazole  40 mg Intravenous Q24H    insulin degludec  12 Units Subcutaneous Nightly    metoprolol   5 mg Intravenous Q6H    methylPREDNISolone  40 mg Intravenous Daily    insulin aspart  2-10 Units Subcutaneous TID AC and HS    dilTIAZem  10 mg Intravenous Once    ipratropium-albuterol  3 mL Nebulization 4 times per day    acetylcysteine  2 mL Nebulization BID    iron sucrose  200 mg Intravenous Daily    piperacillin-tazobactam  3.375 g Intravenous q12h    insulin aspart  10 Units Subcutaneous Once    [Held by provider] predniSONE  40 mg Oral Daily with breakfast    atorvastatin  40 mg Oral Nightly    fluticasone furoate-vilanterol  1 puff Inhalation Daily    levothyroxine  50 mcg Oral Daily    metoprolol tartrate  12.5 mg Oral 2x Daily(Beta Blocker)    guaiFENesin ER  600 mg Oral BID    [Held by provider] apixaban  2.5 mg Oral BID    tamsulosin  0.4 mg Oral Nightly     Continuous Infusions:    continuous dose heparin 700 Units/hr (03/30/24 1600)    sodium chloride 125 mL/hr at 03/31/24 0532     PRN: sodium chloride, sennosides, bisacodyl, glucose **OR** glucose **OR** glucose-vitamin C **OR** dextrose **OR** glucose **OR** glucose **OR** glucose-vitamin C, acetaminophen, alum-mag hydroxide-simethicone, acetaminophen, melatonin, ondansetron, metoclopramide, benzonatate, ipratropium-albuterol, benzocaine-menthol, acetaminophen, benzocaine-menthol    Assessment/Plan:  Principal Problem:    COPD exacerbation (HCC)  Active Problems:    Pleural effusion on right    Acute bronchiolitis due to respiratory syncytial virus (RSV)    89 year old male with PMH sig for COPD, paroxysmal atrial fibrillation, hypertension, CKD4, hyperlipidemia, diabetes mellitus type 2, history of CVA, hypothyroidism, 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  Dw daughter     Dysphagia  - make NPO, speech eval Monday for likely video  - seen by SLP earlier in admission     Essential HTN  - metoprolol, losartan on HOLD     Hyperlipidemia  - statin     Hx CVA  - asa, statin     Hypothyroidism   - sytnhroid     Constipation  - trial lactulose, bowel regimen      FEN: regular diet, PT/OT  Proph: SCDs, heparin gtt  Code status: Full code     Dispo - cont to monitor       Est[facility] DcCovenant Medical Centerist  Internal Medicine  Answering Service number: [phone]

## 2024-03-31 NOTE — PROGRESS NOTES
Nephrology Progress Note    Hector Nguyen Attending:  Michele Chatterjee MD       SUBJECTIVE:     Cr better   Speech rec NPO    PHYSICAL EXAM:     Vital Signs: /56 (BP Location: Right arm)   Pulse 73   Temp 97.8 °F (36.6 °C) (Oral)   Resp 20   Ht 5' 7\" (1.702 m)   Wt 137 lb (62.1 kg)   SpO2 95%   BMI 21.46 kg/m²   Temp (24hrs), Av.9 °F (36.6 °C), Min:97.6 °F (36.4 °C), Max:98.3 °F (36.8 °C)       Intake/Output Summary (Last 24 hours) at 3/31/2024 1309  Last data filed at 3/31/2024 0737  Gross per 24 hour   Intake 2868.9 ml   Output 400 ml   Net 2468.9 ml     Wt Readings from Last 3 Encounters:   24 137 lb (62.1 kg)   22 133 lb (60.3 kg)   22 133 lb 2.5 oz (60.4 kg)       General: pleasant, well appearing  HEENT: NCAT  Neck: Supple  Cardiac: Regular rate and rhythm, no murmurs  Lungs: CTAB  Abdomen: Soft, non-tender, non-distended  Extremities: no LE edema  Neurologic/Psych: mentating well       LABORATORY DATA:     Lab Results   Component Value Date     (H) 2024    BUN 96 (H) 2024    BUNCREA 18.0 02/10/2022    CREATSERUM 2.71 (H) 2024    ANIONGAP 5 2024    GFRNAA 39 (L) 2022    GFRAA 45 (L) 2022    CA 9.3 2024    OSMOCALC 347 (H) 2024    ALKPHO 84 2024    AST 14 (L) 2024    ALT 14 (L) 2024    BILT 0.5 2024    TP 7.3 2024    ALB 2.8 (L) 2024    GLOBULIN 4.5 (H) 2024     (H) 2024    K 3.4 (L) 2024     (H) 2024    CO2 26.0 2024     Lab Results   Component Value Date    WBC 21.9 (H) 2024    RBC 3.64 (L) 2024    HGB 9.6 (L) 2024    HCT 29.6 (L) 2024    .0 2024    MCV 81.3 2024    MCH 26.4 2024    MCHC 32.4 2024    RDW 15.0 2024    NEPRELIM 14.87 (H) 2024    NEUTABS 15.00 (H) 2024    LYMPHABS 0.65 (L) 2024    EOSABS 0.33 2024    BASABS 0.00 2024    NEUT 48  03/30/2024    LYMPH 4 03/30/2024    MON 2 03/30/2024    EOS 2 03/30/2024    BASO 0 03/30/2024    NEPERCENT 87.9 03/28/2024    LYPERCENT 1.9 03/28/2024    MOPERCENT 9.7 03/28/2024    EOPERCENT 0.0 03/28/2024    BAPERCENT 0.1 03/28/2024    NE 12.81 (H) 03/28/2024    LYMABS 0.27 (L) 03/28/2024    MOABSO 1.41 (H) 03/28/2024    EOABSO 0.00 03/28/2024    BAABSO 0.02 03/28/2024     Lab Results   Component Value Date    MALBP 13.3 01/26/2022    CREUR 81.40 03/30/2024     Lab Results   Component Value Date    COLORUR Yellow 02/22/2022    CLARITY Clear 02/22/2022    SPECGRAVITY 1.015 02/22/2022    GLUUR >=500 (A) 02/22/2022    BILUR Negative 02/22/2022    KETUR 20 (A) 02/22/2022    BLOODURINE Moderate (A) 02/22/2022    PHURINE 7.0 02/22/2022    PROUR 30 (A) 02/22/2022    UROBILINOGEN <2.0 02/22/2022    NITRITE Negative 02/22/2022    LEUUR Negative 02/22/2022    WBCUR 1-5 02/22/2022    RBCUR 0-2 02/22/2022    EPIUR None Seen 02/22/2022    BACUR None Seen 02/22/2022         IMAGING:     New studies reviewed    MEDICATIONS:       Current Facility-Administered Medications   Medication Dose Route Frequency    sodium chloride (Saline Mist) 0.65 % nasal solution 1 spray  1 spray Each Nare Q3H PRN    insulin degludec 100 units/mL flextouch 15 Units  15 Units Subcutaneous Nightly    sodium chloride 7 % nebulizer solution 4 mL  4 mL Nebulization q6h    dextrose 5% infusion   Intravenous Continuous    pantoprazole (Protonix) injection 40 mg  40 mg Intravenous Q24H    heparin (Porcine) 59817 units/250mL infusion ACS/AFIB CONTINUOUS  200-3,000 Units/hr Intravenous Continuous    metoprolol (Lopressor) 5 mg/5mL injection 5 mg  5 mg Intravenous Q6H    methylPREDNISolone sodium succinate (Solu-MEDROL) injection 40 mg  40 mg Intravenous Daily    insulin aspart (NovoLOG) 100 Units/mL FlexPen 2-10 Units  2-10 Units Subcutaneous TID AC and HS    dilTIAZem (cardIZEM) 25 mg/5mL injection 10 mg  10 mg Intravenous Once    ipratropium-albuterol  (Duoneb) 0.5-2.5 (3) MG/3ML inhalation solution 3 mL  3 mL Nebulization 4 times per day    acetylcysteine (Mucomyst) 20 % nebulizer solution 2 mL  2 mL Nebulization BID    sennosides (Senokot) tab 8.6 mg  8.6 mg Oral Daily PRN    iron sucrose (Venofer) 20 mg/mL injection 200 mg  200 mg Intravenous Daily    piperacillin-tazobactam (Zosyn) 3.375 g in dextrose 5% 100 mL IVPB-ADDV  3.375 g Intravenous q12h    bisacodyl (Dulcolax) 10 MG rectal suppository 10 mg  10 mg Rectal Daily PRN    insulin aspart (NovoLOG) 100 Units/mL FlexPen 10 Units  10 Units Subcutaneous Once    glucose (Dex4) 15 GM/59ML oral liquid 15 g  15 g Oral Q15 Min PRN    Or    glucose (Glutose) 40% oral gel 15 g  15 g Oral Q15 Min PRN    Or    glucose-vitamin C (Dex-4) chewable tab 4 tablet  4 tablet Oral Q15 Min PRN    Or    dextrose 50% injection 50 mL  50 mL Intravenous Q15 Min PRN    Or    glucose (Dex4) 15 GM/59ML oral liquid 30 g  30 g Oral Q15 Min PRN    Or    glucose (Glutose) 40% oral gel 30 g  30 g Oral Q15 Min PRN    Or    glucose-vitamin C (Dex-4) chewable tab 8 tablet  8 tablet Oral Q15 Min PRN    acetaminophen (Tylenol) rectal suppository 650 mg  650 mg Rectal Q6H PRN    [Held by provider] predniSONE (Deltasone) tab 40 mg  40 mg Oral Daily with breakfast    alum-mag hydroxide-simethicone (Maalox) 200-200-20 MG/5ML oral suspension 30 mL  30 mL Oral QID PRN    atorvastatin (Lipitor) tab 40 mg  40 mg Oral Nightly    fluticasone furoate-vilanterol (Breo Ellipta) 200-25 MCG/ACT inhaler 1 puff  1 puff Inhalation Daily    levothyroxine (Synthroid) tab 50 mcg  50 mcg Oral Daily    metoprolol tartrate (Lopressor) partial tab 12.5 mg  12.5 mg Oral 2x Daily(Beta Blocker)    acetaminophen (Tylenol Extra Strength) tab 500 mg  500 mg Oral Q4H PRN    melatonin tab 3 mg  3 mg Oral Nightly PRN    ondansetron (Zofran) 4 MG/2ML injection 4 mg  4 mg Intravenous Q6H PRN    metoclopramide (Reglan) 5 mg/mL injection 5 mg  5 mg Intravenous Q8H PRN    benzonatate  (Tessalon) cap 200 mg  200 mg Oral TID PRN    guaiFENesin ER (Mucinex) 12 hr tab 600 mg  600 mg Oral BID    ipratropium-albuterol (Duoneb) 0.5-2.5 (3) MG/3ML inhalation solution 3 mL  3 mL Nebulization Q4H PRN    benzocaine-menthol (Cepacol) lozenge 1 lozenge  1 lozenge Oral PRN    [Held by provider] apixaban (Eliquis) tab 2.5 mg  2.5 mg Oral BID    tamsulosin (Flomax) cap 0.4 mg  0.4 mg Oral Nightly    acetaminophen (Tylenol) tab 325-650 mg  325-650 mg Oral Q6H PRN    benzocaine-menthol (Cepacol) lozenge 1 lozenge  1 lozenge Oral PRN       ASSESSMENT/PLAN:     QUINTON  -prerenal due to volume depletion, Radha 9  -No recent IV contrast  Hypernatremia  -SNa corrects to 152 meq/L for hyperglycemia   Anemia  -Iron deficient  Hypertension  Diastolic CHF  -PTA regimen: Lasix 40 twice daily, metoprolol 12.5 mg twice daily, losartan 100 mg daily  COPD exacerbation with RSV bronchitis        SCr improved with 0.9NS  Change IVF to D5W   IV iron  Hold BP Rx  Compensated  Pulmonary following        DO Jovani Ramirez [facility]

## 2024-03-31 NOTE — PLAN OF CARE
Patient is alert and oriented x3-4.  Afib/NS on tele. VSS. On 3L. Scheduled nebs. Heparin gtt infusing at 7ml/hr. Next aptt in am. PT/OT. IVF infusing. NPO. QID accu checks. POC discussed with pt and family at bedside.   Problem: RESPIRATORY - ADULT  Goal: Achieves optimal ventilation and oxygenation  Description: INTERVENTIONS:  - Assess for changes in respiratory status  - Assess for changes in mentation and behavior  - Position to facilitate oxygenation and minimize respiratory effort  - Oxygen supplementation based on oxygen saturation or ABGs  - Provide Smoking Cessation handout, if applicable  - Encourage broncho-pulmonary hygiene including cough, deep breathe, Incentive Spirometry  - Assess the need for suctioning and perform as needed  - Assess and instruct to report SOB or any respiratory difficulty  - Respiratory Therapy support as indicated  - Manage/alleviate anxiety  - Monitor for signs/symptoms of CO2 retention  Outcome: Progressing     Problem: Patient/Family Goals  Goal: Patient/Family Long Term Goal  Description: Patient's Long Term Goal: discharge home    Interventions:  - See additional Care Plan goals for specific interventions  Outcome: Progressing  Goal: Patient/Family Short Term Goal  Description: Patient's Short Term Goal:   3/28: Maintain safety  3/29: maintain safety  03/29/2024 - Blood sugar control  3/30 AM - Wean O2  3/30 noc: decrease sob  Interventions:   - insulin as ordered  - supp O2, nebs, Flutter  - See additional Care Plan goals for specific interventions  Outcome: Progressing

## 2024-04-01 ENCOUNTER — APPOINTMENT (OUTPATIENT)
Dept: GENERAL RADIOLOGY | Facility: HOSPITAL | Age: 89
End: 2024-04-01
Attending: INTERNAL MEDICINE
Payer: COMMERCIAL

## 2024-04-01 LAB
ALBUMIN SERPL-MCNC: 2.1 G/DL (ref 3.4–5)
ALBUMIN/GLOB SERPL: 0.5 {RATIO} (ref 1–2)
ALP LIVER SERPL-CCNC: 109 U/L
ALT SERPL-CCNC: 35 U/L
ANION GAP SERPL CALC-SCNC: 7 MMOL/L (ref 0–18)
ANION GAP SERPL CALC-SCNC: 7 MMOL/L (ref 0–18)
APTT PPP: 58.8 SECONDS (ref 23.3–35.6)
AST SERPL-CCNC: 15 U/L (ref 15–37)
BILIRUB SERPL-MCNC: 0.9 MG/DL (ref 0.1–2)
BUN BLD-MCNC: 76 MG/DL (ref 9–23)
BUN BLD-MCNC: 83 MG/DL (ref 9–23)
CALCIUM BLD-MCNC: 9.1 MG/DL (ref 8.5–10.1)
CALCIUM BLD-MCNC: 9.5 MG/DL (ref 8.5–10.1)
CHLORIDE SERPL-SCNC: 117 MMOL/L (ref 98–112)
CHLORIDE SERPL-SCNC: 121 MMOL/L (ref 98–112)
CO2 SERPL-SCNC: 22 MMOL/L (ref 21–32)
CO2 SERPL-SCNC: 22 MMOL/L (ref 21–32)
CREAT BLD-MCNC: 2.09 MG/DL
CREAT BLD-MCNC: 2.25 MG/DL
EGFRCR SERPLBLD CKD-EPI 2021: 27 ML/MIN/1.73M2 (ref 60–?)
EGFRCR SERPLBLD CKD-EPI 2021: 29 ML/MIN/1.73M2 (ref 60–?)
ERYTHROCYTE [DISTWIDTH] IN BLOOD BY AUTOMATED COUNT: 15.2 %
EST. AVERAGE GLUCOSE BLD GHB EST-MCNC: 226 MG/DL (ref 68–126)
GLOBULIN PLAS-MCNC: 4.6 G/DL (ref 2.8–4.4)
GLUCOSE BLD-MCNC: 209 MG/DL (ref 70–99)
GLUCOSE BLD-MCNC: 230 MG/DL (ref 70–99)
GLUCOSE BLD-MCNC: 231 MG/DL (ref 70–99)
GLUCOSE BLD-MCNC: 235 MG/DL (ref 70–99)
GLUCOSE BLD-MCNC: 296 MG/DL (ref 70–99)
GLUCOSE BLD-MCNC: 307 MG/DL (ref 70–99)
HBA1C MFR BLD: 9.5 % (ref ?–5.7)
HCT VFR BLD AUTO: 31.6 %
HGB BLD-MCNC: 9.9 G/DL
MCH RBC QN AUTO: 25.7 PG (ref 26–34)
MCHC RBC AUTO-ENTMCNC: 31.3 G/DL (ref 31–37)
MCV RBC AUTO: 82.1 FL
OSMOLALITY SERPL CALC.SUM OF ELEC: 331 MOSM/KG (ref 275–295)
OSMOLALITY SERPL CALC.SUM OF ELEC: 343 MOSM/KG (ref 275–295)
PLATELET # BLD AUTO: 229 10(3)UL (ref 150–450)
POTASSIUM SERPL-SCNC: 3.5 MMOL/L (ref 3.5–5.1)
POTASSIUM SERPL-SCNC: 3.7 MMOL/L (ref 3.5–5.1)
PROT SERPL-MCNC: 6.7 G/DL (ref 6.4–8.2)
RBC # BLD AUTO: 3.85 X10(6)UL
SODIUM SERPL-SCNC: 146 MMOL/L (ref 136–145)
SODIUM SERPL-SCNC: 150 MMOL/L (ref 136–145)
WBC # BLD AUTO: 19.1 X10(3) UL (ref 4–11)

## 2024-04-01 PROCEDURE — 74230 X-RAY XM SWLNG FUNCJ C+: CPT | Performed by: INTERNAL MEDICINE

## 2024-04-01 PROCEDURE — 94640 AIRWAY INHALATION TREATMENT: CPT

## 2024-04-01 PROCEDURE — 82962 GLUCOSE BLOOD TEST: CPT

## 2024-04-01 PROCEDURE — 92611 MOTION FLUOROSCOPY/SWALLOW: CPT

## 2024-04-01 PROCEDURE — 85027 COMPLETE CBC AUTOMATED: CPT | Performed by: INTERNAL MEDICINE

## 2024-04-01 PROCEDURE — 97110 THERAPEUTIC EXERCISES: CPT

## 2024-04-01 PROCEDURE — 94667 MNPJ CHEST WALL 1ST: CPT

## 2024-04-01 PROCEDURE — 80053 COMPREHEN METABOLIC PANEL: CPT | Performed by: INTERNAL MEDICINE

## 2024-04-01 PROCEDURE — 80048 BASIC METABOLIC PNL TOTAL CA: CPT | Performed by: INTERNAL MEDICINE

## 2024-04-01 PROCEDURE — 85730 THROMBOPLASTIN TIME PARTIAL: CPT | Performed by: INTERNAL MEDICINE

## 2024-04-01 PROCEDURE — 83036 HEMOGLOBIN GLYCOSYLATED A1C: CPT | Performed by: INTERNAL MEDICINE

## 2024-04-01 PROCEDURE — 92526 ORAL FUNCTION THERAPY: CPT

## 2024-04-01 PROCEDURE — C9113 INJ PANTOPRAZOLE SODIUM, VIA: HCPCS | Performed by: HOSPITALIST

## 2024-04-01 RX ORDER — METHYLPREDNISOLONE SODIUM SUCCINATE 40 MG/ML
20 INJECTION, POWDER, LYOPHILIZED, FOR SOLUTION INTRAMUSCULAR; INTRAVENOUS DAILY
Status: DISCONTINUED | OUTPATIENT
Start: 2024-04-02 | End: 2024-04-02

## 2024-04-01 RX ORDER — SODIUM CHLORIDE FOR INHALATION 7 %
4 VIAL, NEBULIZER (ML) INHALATION
Status: DISCONTINUED | OUTPATIENT
Start: 2024-04-01 | End: 2024-04-06

## 2024-04-01 RX ORDER — IPRATROPIUM BROMIDE AND ALBUTEROL SULFATE 2.5; .5 MG/3ML; MG/3ML
3 SOLUTION RESPIRATORY (INHALATION)
Status: DISCONTINUED | OUTPATIENT
Start: 2024-04-01 | End: 2024-04-06

## 2024-04-01 RX ORDER — INSULIN DEGLUDEC 100 U/ML
20 INJECTION, SOLUTION SUBCUTANEOUS NIGHTLY
Status: DISCONTINUED | OUTPATIENT
Start: 2024-04-01 | End: 2024-04-04

## 2024-04-01 NOTE — PROGRESS NOTES
[facility]  Hospitalist Progress Note                                                                     [facility]   part of [facility] GABE Nguyen  12/8/1932    SUBJECTIVE:    Seems more awake/more energy.  Brushing teethe.  About to go for video swallow.              OBJECTIVE:  Temp:  [96.8 °F (36 °C)-98.4 °F (36.9 °C)] 98.4 °F (36.9 °C)  Pulse:  [52-99] 75  Resp:  [16-22] 16  BP: (126-158)/(42-72) 140/48  SpO2:  [91 %-100 %] 97 %  Exam  Gen: No acute distress, alert and oriented x3, no focal neurologic deficits  Pulm: clear , NO wheezing.    CV: Heart with regular rate and rhythm, no murmur.  Normal PMI.    Abd: Abdomen soft, nontender, nondistended, no organomegaly, bowel sounds present  MSK: Full range of motion in extremities, no clubbing, no cyanosis  Skin: no rashes or lesions    Labs:   Recent Labs   Lab 03/28/24  0701 03/29/24  0411 03/30/24  0616 03/31/24  0710 04/01/24  0644   WBC 14.6* 7.3 16.3* 21.9* 19.1*   HGB 10.4* 10.1* 9.0* 9.6* 9.9*   MCV 79.1* 79.7* 80.4 81.3 82.1   .0 268.0 239.0 240.0 229.0   BAND  --  49 44  --   --        Recent Labs   Lab 03/27/24  0656 03/28/24  0701 03/29/24  0411 03/30/24  0616 03/31/24  0710 03/31/24  1741 04/01/24  0644   *   < > 139 146* 150* 149* 150*   K 4.4   < > 4.4 3.5 3.4* 2.9* 3.7      < > 105 112 119* 115* 121*   CO2 26.0   < > 27.0 27.0 26.0 25.0 22.0   BUN 42*   < > 73* 97* 96* 91* 83*   CREATSERUM 1.95*   < > 2.51* 3.14* 2.71* 2.51* 2.25*   CA 9.7   < > 9.8 9.5 9.3 9.6 9.5   MG 2.2  --   --   --   --   --   --    *   < > 271* 272* 229* 324* 235*    < > = values in this interval not displayed.       Recent Labs   Lab 03/26/24  0933 03/27/24  0656 03/31/24  1741   ALT 18 14* 42   AST 18 14* 21   ALB 3.4 2.8* 2.2*       Recent Labs   Lab 03/31/24  0507 03/31/24  1124 03/31/24  1637 03/31/24  2056 04/01/24  0526   PGLU 230* 226* 302* 316* 307*        Meds:   Scheduled:    insulin degludec  20 Units Subcutaneous Nightly    insulin aspart  4-20 Units Subcutaneous TID AC and HS    sodium chloride  4 mL Nebulization Q8H DIONI    ipratropium-albuterol  3 mL Nebulization Q8H DIONI    pantoprazole  40 mg Intravenous Q24H    metoprolol  5 mg Intravenous Q6H    methylPREDNISolone  40 mg Intravenous Daily    dilTIAZem  10 mg Intravenous Once    acetylcysteine  2 mL Nebulization BID    iron sucrose  200 mg Intravenous Daily    piperacillin-tazobactam  3.375 g Intravenous q12h    insulin aspart  10 Units Subcutaneous Once    [Held by provider] predniSONE  40 mg Oral Daily with breakfast    atorvastatin  40 mg Oral Nightly    fluticasone furoate-vilanterol  1 puff Inhalation Daily    levothyroxine  50 mcg Oral Daily    metoprolol tartrate  12.5 mg Oral 2x Daily(Beta Blocker)    guaiFENesin ER  600 mg Oral BID    [Held by provider] apixaban  2.5 mg Oral BID    tamsulosin  0.4 mg Oral Nightly     Continuous Infusions:    dextrose 83 mL/hr at 04/01/24 0017    continuous dose heparin 700 Units/hr (03/31/24 1936)     PRN: sodium chloride, glycerin-hypromellose-, sennosides, bisacodyl, glucose **OR** glucose **OR** glucose-vitamin C **OR** dextrose **OR** glucose **OR** glucose **OR** glucose-vitamin C, acetaminophen, alum-mag hydroxide-simethicone, acetaminophen, melatonin, ondansetron, metoclopramide, benzonatate, ipratropium-albuterol, benzocaine-menthol, acetaminophen, benzocaine-menthol    Assessment/Plan:  Principal Problem:    COPD exacerbation (HCC)  Active Problems:    Pleural effusion on right    Acute bronchiolitis due to respiratory syncytial virus (RSV)    89 year old male with PMH sig for COPD, paroxysmal atrial fibrillation, hypertension, CKD4, hyperlipidemia, diabetes mellitus type 2, history of CVA, hypothyroidism, 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  Dw daughter     Dysphagia  - make NPO, video today  - seen by SLP earlier in admission     Essential HTN  - metoprolol, losartan on HOLD     Hyperlipidemia  - statin     Hx CVA  - asa, statin     Hypothyroidism   - sytnhroid     Constipation  - trial lactulose, bowel regimen      FEN: regular diet, PT/OT  Proph: SCDs, heparin gtt  Code status: Full code     Dispo - cont to monitor       [facility]  Internal Medicine  Answering Service number: [phone]

## 2024-04-01 NOTE — SLP NOTE
SPEECH DAILY NOTE - INPATIENT    ASSESSMENT & PLAN   ASSESSMENT  Patient seen today for ongoing assessment of oropharyngeal swallow. VFSS recommended, but chart review indicated pt/family wish to defer study until tomorrow. Discussed with patient and family at bedside this morning who were requesting small amounts of PO intake despite NPO recommendations. Education provided re: rationale for instrumental exam and risks associated with aspiration. Patient and family wished to discuss amongst themselves prior to making a decision, but ultimately agreed to have VFSS completed today. Further recommendations pending results of exam.    Diet Recommendations - Solids: NPO  Diet Recommendations - Liquids: NPO    Compensatory Strategies Recommended: Small bites and sips  Aspiration Precautions: Upright position  Medication Administration Recommendations: One pill at a time (w/ puree prn)    Patient Experiencing Pain: No                Treatment Plan  Treatment Plan/Recommendations: Videofluoroscopic swallow study    Interdisciplinary Communication: Discussed with RN            GOALS  Goal #1 VFSS  In Progress     FOLLOW UP  Follow Up Needed (Documentation Required): Yes  SLP Follow-up Date: 04/01/24       Session: 1    If you have any questions, please contact JULIETA Baxter

## 2024-04-01 NOTE — PAYOR COMM NOTE
--------------  ADMISSION REVIEW   3/26-3/31  Payor: [employer]  Subscriber #:  [id number]  Authorization Number: [id number]    Admit date: 3/29/24  Admit time: 11:15 AM  Admit Orders (From admission, onward)       Start     Ordered    03/29/24 1115  Admit to inpatient Once  Once        Ordering Provider: Estela Seaman MD   Question:  Diagnosis  Answer:  COPD exacerbation (HCC)    03/29/24 1115    03/26/24 1331  Place in observation Once  (Place Observation TELE Medicine)  Once        Ordering Provider: Michael Zamudio MD   Question:  Diagnosis  Answer:  COPD exacerbation (HCC)    03/26/24 1330        REVIEW DOCUMENTATION:  ED Provider Notes signed by Michael Zamudio MD at 3/26/2024 12:45 PM    Patient Seen in: [facility] Emergency Department  History     Chief Complaint   Patient presents with    Difficulty Breathing    Cough/URI     Stated Complaint: cold like symptoms. lisa    Subjective:   HPI    89-year-old male who has a history of COPD comes to the hospital with a runny nose, sneeze and cough with wheezing that is been present since the weekend.  They have been using nebulizers at home and they feel the cough is worsening and his wheezing is worsening.  He denies any headaches.  No neck pain or stiffness.  He denies any pain in his chest or abdomen.  Denies any nausea, vomiting or diarrhea.  Said no known fevers, chills or bodyaches.  He is denying any other complaints at this time.    Objective:   Past Medical History:   Diagnosis Date    Abnormal CXR 3/30/2015    Arthritis of both knees 2/27/2015    Advanced, xrays 2013    Asthma (HCC)     Diabetes (HCC)     Diabetes mellitus (HCC) 2/27/2015    Disorder of thyroid     High blood pressure     High cholesterol     HTN (hypertension), benign 2/27/2015    Hydropneumothorax 2/17/2022    Hyperlipidemia 2/27/2015    Hypothyroidism due to acquired atrophy of thyroid 4/6/2015    Ischemic stroke (HCC)     LVH (left ventricular hypertrophy)  2/27/2015    Osteoarthritis     Shortness of breath     Solitary pulmonary nodule on lung CT 5/1/2015    A 3.5 mm nodular opacity in the caudal and posterior lateral corner of the posterior segment of the right upper lobe (image 113 of series 3) represents a nonspecific finding.   Positive for stated complaint: cold like symptoms. lisa  Other systems are as noted in HPI.  Constitutional and vital signs reviewed.      All other systems reviewed and negative except as noted above.    Physical Exam     ED Triage Vitals   BP 03/26/24 0917 (!) 188/69   Pulse 03/26/24 0917 91   Resp 03/26/24 1010 25   Temp 03/26/24 0917 99.3 °F (37.4 °C)   Temp src 03/26/24 0917 Temporal   SpO2 03/26/24 0917 93 %   O2 Device 03/26/24 0917 None (Room air)       Current:/47   Pulse 88   Temp 99.3 °F (37.4 °C) (Temporal)   Resp 23   Ht 170.2 cm (5' 7\")   Wt 62.1 kg   SpO2 100%   BMI 21.46 kg/m²     HEENT : NCAT, EOMI, PEERL,  neck supple, no JVD, trachea midline, No LAD  Heart: S1S2 normal. No murmurs, regular rate and rhythm  Lungs: Bilateral wheezing with increased expiratory phase noted with cough with bilateral rhonchi  Abdomen: Soft nontender nondistended normal active bowel sounds without rebound, guarding or masses noted  Back nontender without CVA tenderness  Extremity no clubbing, cyanosis or edema noted.  Full range of motion noted without tenderness  Neuro: No focal deficits noted    All measures to prevent infection transmission during my interaction with the patient were taken.  The patient was already wearing droplet mask on my arrival to the room.  Personal protective equipment including a droplet mask as well as gloves were worn throughout the duration of my exam.  Hand washing was performed prior to and after the exam.  Stethoscope and equipment used during my examination was cleaned with a super Sani cloth germicidal wipe following the exam.    ED Course     Labs Reviewed   COMP METABOLIC PANEL (14) - Abnormal;  Notable for the following components:       Result Value    Glucose 217 (*)     BUN 25 (*)     Creatinine 1.82 (*)     eGFR-Cr 35 (*)     Total Protein 8.5 (*)     Globulin  5.1 (*)     A/G Ratio 0.7 (*)     All other components within normal limits   PRO BETA NATRIURETIC PEPTIDE - Abnormal; Notable for the following components:    Pro-Beta Natriuretic Peptide 3,090 (*)     All other components within normal limits   SARS-COV-2/FLU A AND B/RSV BY PCR (GENEXPERT) - Abnormal; Notable for the following components:    RSV by PCR Positive (*)     All other components within normal limits    Narrative:     This test is intended for the qualitative detection and differentiation of SARS-CoV-2, influenza A, influenza B, and respiratory syncytial virus (RSV) viral RNA in nasopharyngeal or nares swabs from individuals suspected of respiratory viral infection consistent with COVID-19 by their healthcare provider. Signs and symptoms of respiratory viral infection due to SARS-CoV-2, influenza, and RSV can be similar.    Test performed using the Xpert Xpress SARS-CoV-2/FLU/RSV (real time RT-PCR)  assay on the [employer] instrument, [employer], [place] [address].   This test is being used under the Food and Drug Administration's Emergency Use Authorization.    The authorized Fact Sheet for Healthcare Providers for this assay is available upon request from the laboratory.   CBC W/ DIFFERENTIAL - Abnormal; Notable for the following components:    HGB 11.7 (*)     HCT 37.5 (*)     Lymphocyte Absolute 0.92 (*)     All other components within normal limits   LACTIC ACID, PLASMA - Normal   TROPONIN I HIGH SENSITIVITY - Normal   CBC WITH DIFFERENTIAL WITH PLATELET    Narrative:     The following orders were created for panel order CBC With Differential With Platelet.  Procedure                               Abnormality         Status                     ---------                               -----------         ------                      CBC W/ DIFFERENTIAL[[id number]]          Abnormal            Final result                 Please view results for these tests on the individual orders.   RAINBOW DRAW BLUE   RAINBOW DRAW GOLD   BLOOD CULTURE   BLOOD CULTURE   EKG    Rate, intervals and axes as noted on EKG Report.  Rate: 88  Rhythm: Junctional rhythm noted  Reading:QRS of 70 with junctional rhythm noted and left anterior fascicular block but no other acute ischemic change noted.    ED Course as of 03/26/24 1241  ------------------------------------------------------------  Time: 03/26 1208  Comment: While here the patient had 2 back-to-back hour-long albuterol treatments with Atrovent.  The patient was given Solu-Medrol IV as well.  The patient had a white count of 9000.  The troponin was 22.  Patient lactic acid level was negative.  He is positive for RSV.  His BNP was 3090.  His BUN/creatinine was 25 and 1.2 respectively.  The patient had a chest x-ray done that I first interpreted showing a right-sided effusion.  Read the radiology report as well.  The patient certainly had improvement with marked diminishment of the wheezing but fine crackles are still noted throughout.  At this time the patient mated to the hospitalist with pulmonology on consult.     XR CHEST PA + LAT CHEST (CPT=71046)    Result Date: 3/26/2024  CONCLUSION:  1. Persistent moderate size right pleural effusion, underlying atelectasis/consolidation cannot be excluded. 2. Right mid lung 1.8 cm nodule along the periphery not appreciated on prior studies.  A CT of the chest can be performed for further evaluation as clinically indicated.   LOCATION:  [facility]   Dictated by (CST): Sophie Zamarripa MD on 3/26/2024 at 10:35 AM     Finalized by (CST): Sophie Zamarripa MD on 3/26/2024 at 10:38 AM        Medications   methylPREDNISolone sodium succinate (Solu-MEDROL) injection 125 mg (125 mg Intravenous Given 3/26/24 1009)   albuterol (Ventolin) (5 MG/ML) 0.5% nebulizer solution 10 mg (10 mg  Nebulization Given 3/26/24 1010)   ipratropium (Atrovent) 0.02 % nebulizer solution 1.5 mg (1.5 mg Nebulization Given 3/26/24 1010)   albuterol (Ventolin) (5 MG/ML) 0.5% nebulizer solution 10 mg (10 mg Nebulization Given 3/26/24 1112)     MDM      Differential diagnosis includes COPD exacerbation, pneumonia, RSV, influenza and RSV but not limited such.  While here the patient was given 2 back-to-back hour-long respiratory treatments with certain improvement in lung sounds and he is breathing.  This time patient be admitted for further care at this time.    Critical Care Note:  The patient arrived with a condition with significant morbidity and mortality associated. The services I provided  were to promote improvement and reduce mortality specifically involving complex record review, complex medical decisions and interventions, and consultations outside the regular procedures and care normally rendered for 45 minutes of critical care time    This note was prepared using Dragon Medical voice recognition dictation software.  As a result errors may occur.  When identified to these areas have been corrected.  While every attempt is made to correct errors during dictation discrepancies may still exist.  Please contact if there are any errors.    Admission disposition: 3/26/2024 12:41 PM    Disposition and Plan     Clinical Impression:  1. COPD exacerbation (HCC)    2. Pleural effusion on right    3. Acute bronchiolitis due to respiratory syncytial virus (RSV)         Disposition:  Admit  3/26/2024 12:41 pm  Hospital Problems       Present on Admission  Date Reviewed: 5/23/2022            ICD-10-CM Noted POA    * (Principal) COPD exacerbation (HCC) J44.1 3/26/2024 Unknown                 3/26 H&P  History of Present Illness: Patient is a 89 year old male with PMH sig for COPD, paroxysmal atrial fibrillation, hypertension, CKD4, hyperlipidemia, diabetes mellitus type 2, history of CVA, hypothyroidism, stable chronic  moderate right pleural effusion with trapped lung presented with shortness of breath.  Symptoms have been going on for the last 3 to 4 days.  Also reporting runny nose, sneezing and coughing with wheezing.  Denies any productive cough, minimal sputum which is white.  Tried using nebulizers at home with no improvement.  Tried taking Robitussin with no improvement and rescue nebulizers.  Subsequently came to the hospital after no improvement.  In the ER he had a temp of 99.3, saturating 93% on room air and hypertensive.  Labs significant for proBNP of 3000, creatinine of 1.8 and tested positive for RSV.  Chest x-ray with persistent moderate right-sided pleural effusion.  Diagnosed with COPD exacerbation and started on IV steroids and nebulizers.  Pulmonology consulted and admitted for further evaluation and treatment.    Lungs:   Expiratory wheezing/rhonchi bilaterally. Normal effort       89 year old male with PMH sig for COPD, paroxysmal atrial fibrillation, hypertension, CKD4, hyperlipidemia, diabetes mellitus type 2, history of CVA, hypothyroidism, 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  - stable     CKD stage 4  - BLE edema  - elevated BNP  - will give IV lasix BID while admitted to help with volume status, monitor UO/daily weights   - should consider outpatient nephrology f/u      PAF  - metoprolol, Eliquis      Essential HTN  - metoprolol, losartan     Hyperlipidemia  - statin     Hx CVA  - asa, statin     Hypothyroidism   - sytnhroid      FEN: regular diet, PT/OT  Proph: SCDs, eliquis  Code status: Full code         3/26 Pulmonary  HPI: Pt is a 89-year-old male who has a history of COPD, chronic R effusion with trapped lung that comes to  the hospital with a runny nose, sneeze and cough with wheezing that is been present for about 4 days.  He has been using nebulizers at home and they feel the cough is worsening and his wheezing is worsening. He denies any headaches. No neck pain or stiffness. He denies any pain in his chest or abdomen. Denies any nausea, vomiting or diarrhea. Said no known fevers, chills or bodyaches.     Assessment and Plan:  Dyspnea -- secondary to COPD exac asso with RSV infection.  -O2 PRN  Acute Exac of COPD secondary to RSV  -no acute infiltrates on CT chest, monitor off Abx  -solumedrol  -Breo  -BD protocol  Pleural Effusion -- chronic R effusion  -tapped in past with trapped lung  -would not rec thora at this time  Abn CXR -- there is a R lateral pleural based nodular density on the CXR that does not appear to have been present in the past  -will need out patient follow up with Dr. Jones.  Dispo -- inpatient          3/27  Aspiration event last night, now on restricted diet.   Feeling better from yesterday. +PETERSEN  Productive cough with increasing sputum production.  Denies CP.  Still with audible rhonchi.    Assessment/Plan:  Principal Problem:    COPD exacerbation (HCC)  Active Problems:    Pleural effusion on right    Acute bronchiolitis due to respiratory syncytial virus (RSV)     89 year old male with PMH sig for COPD, paroxysmal atrial fibrillation, hypertension, CKD4, hyperlipidemia, diabetes mellitus type 2, history of CVA, hypothyroidism, 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     Dyspnea -- secondary to COPD exac asso with RSV infection.  -O2 PRN  Acute Exac of COPD secondary to RSV  -no acute infiltrates on CT chest, monitor off Abx  -change solumedrol to PO prednisone given improvement in wheezes.  -Breo  -BD protocol  Pleural Effusion -- chronic R effusion  -tapped in past with trapped lung  -would not rec thora at this time  Abn CXR -- there is a R lateral pleural based nodular density on the CXR that does not appear to have been present in the past  -will need out patient follow up with Dr. Jones.  Dispo -- inpatient            3/28 Pulmonary  Dyspnea -- secondary to COPD exac asso with RSV infection.  -O2 PRN  Acute Exac of COPD secondary to RSV  -no acute infiltrates on CT chest, monitor off Abx  -continue with PO prednisone given improvement in wheezes.  -Breo  -BD protocol  Pleural Effusion -- chronic R effusion  -tapped in past with trapped lung  -would not rec thora at this time  Abn CXR -- there is a R lateral pleural based nodular density on the CXR that does not appear to have been present in the past  -will need out patient follow up with Dr. Jones.  Dispo -- inpatient          3/28 IM  Breathing improving.  Still with coughing.     Pulm: Lungs with bilateral rhonchi, wheezing improving     Assessment/Plan:  Principal Problem:    COPD exacerbation (HCC)  Active Problems:    Pleural effusion on right    Acute bronchiolitis due to respiratory syncytial virus (RSV)     89 year old male with PMH sig for COPD, paroxysmal atrial fibrillation, hypertension, CKD4, hyperlipidemia, diabetes mellitus type 2, history of CVA, hypothyroidism, stable chronic moderate right  pleural effusion with trapped lung presented with shortness of breath.     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     Assessment and Plan     Dyspnea -- secondary to COPD exac asso with RSV infection and now with likely secondary bacterial PNA.  -O2 PRN  -pulm toilet  Acute Exac of COPD secondary to RSV with secondary bacterial pneumonia.  May be due to aspiration given patient's previous poor swallow eval.  -fever overnight with new more focal consolidations on CXR, rajan in RLL.  -zosyn Day #1    -continue with PO prednisone given improvement in wheezes.  -Breo  -BD protocol  Pleural Effusion -- chronic R effusion  -tapped in past with trapped lung  -would not rec thora at this time  Abn CXR -- there is a R lateral pleural based nodular density on the CXR that does not appear to have been present in the past  -will need out patient follow up with Dr. Jones.  Dispo -- inpatient  -discussed with family at bedside.          3/29 IM  Eventful evening/night - noted.   Currently in afib on tele, HR<120  Getting nebulizer treatment.  Fevers.    Pulm: Lungs with bilateral rhonchi, wheezing improving     89 year old male with PMH sig for COPD, paroxysmal atrial fibrillation, hypertension, CKD4, hyperlipidemia, diabetes  mellitus type 2, history of CVA, hypothyroidism, 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  - stable     CKD stage 4  - BLE edema  - elevated BNP  - rising BUN, will hold diuretics as clinically he looks dry  - resume lasix when volume status improves   - should consider outpatient nephrology f/u      Anemia, chronic  - likely from CKD  - venofer      PAF  - metoprolol, Eliquis   - given ongoing afib, will consult cardiology     Essential HTN  - metoprolol, losartan     Hyperlipidemia  - statin     Hx CVA  - asa, statin     Hypothyroidism   - sytnhroid      Constipation  - trial lactulose, bowel regimen      FEN: regular diet, PT/OT  Proph: SCDs, eliquis  Code status: Full code      Dispo - cont to monitor           3/30  BG remain elevated.  Daughter concerned he is still aspirating w meds/applesauce.  Very little po intake.       Increased 02 requirements to 11 L down to 3 this am.       Streaky blood w BM, no melena or blood clots    89 year old male with PMH sig for COPD, paroxysmal atrial fibrillation, hypertension, CKD4, hyperlipidemia, diabetes mellitus type 2, history of CVA, hypothyroidism, 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  Dw daughter      Dysphagia  - make NPO, speech eval Monday for likely video  - seen by SLP earlier in admission     Essential HTN  - metoprolol, losartan on HOLD     Hyperlipidemia  - statin     Hx CVA  - asa, statin     Hypothyroidism   - sytnhroid      Constipation  - trial lactulose, bowel regimen      FEN: regular diet, PT/OT  Proph: SCDs, heparin gtt  Code status: Full code           3/30 Pulmonary  SUBJECTIVE:  overnight, had transient desaturations requiring up to 10L. Has weak cough.      Lungs:  coarse rhonchi bilaterally with poor air mvt     Assessment and Plan:   Acute hypoxic resp failure -- secondary to COPD exac triggered by RSV infection and now with secondary bacterial PNA.  -on escalating O2 requirements; CXR mostly unchanged.  Has significant mucous plugging and inability to clear secretions- start CPT, suctioning prn  -pulm toilet  AECOPD- secondary to RSV with secondary bacterial pneumonia.  May be due to aspiration given patient's previous poor swallow eval.  -CXR unchanged  -zosyn Day (3/29-)   -IV steroid burst; transition to IV bc of poor swallowing/dysphagia  -Breo  -BD protocol  Pleural Effusion -- chronic R effusion- unchanged  -tapped in past with trapped lung  -would not rec thora at this time  Pneumonia- likely aspiration- as  above  - check MRSA swab at daughter's request  - sputum cx if possible  Abn CXR -- there is a R lateral pleural based nodular density on the CXR that does not appear to have been present in the past  -will need out patient follow up with Dr. Jones.  Dispo -- discussed code status. Family wishes for pt to be DNAR/full. No intubation  -discussed with family at bedside.            3/30 Nephrology  Hector Nguyen is a 89 year old male with past medical history significant for COPD, atrial fibrillation, hypertension, CKD stage IV, type 2 diabetes, chronic right pleural effusion who presented to hospital shortness of breath.  Found to have COPD exacerbation due to RSV bronchitis and likely secondary bacterial pneumonia.  Patient has been on treatment for this.  Today nephrologist consulted for progressive QUINTON and hyponatremia.  On my visit patient has minimal to no intake.  He is essentially n.p.o. due to aspiration precautions.  He has no lower extremity edema.  Notes reduction in urine output over the last 24 hours.    QUINTON  -Likely prerenal due to volume depletion  -No recent IV contrast  Hypernatremia  -SNa corrects to 149 meq/L for hyperglycemia   Anemia  -Iron deficient  Hypertension  Diastolic CHF  -PTA regimen: Lasix 40 twice daily, metoprolol 12.5 mg twice daily, losartan 100 mg daily  COPD exacerbation with RSV bronchitis        Check urine electrolytes.  Check PVR.  Give 0.9 NS to help restore the intravascular volume given worsening QUINTON.  0.45 NS after above for maintenance IV fluid.  Encourage oral intake.  IV iron  Hold BP Rx  Compensated  Pulmonary following           3/31 IM  Weaned to 2 L NC.       Failed bedside swallow.  Video planned for Monday.      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  Dw daughter      Dysphagia  - make NPO, speech eval Monday for likely video  - seen by SLP earlier in admission     Essential HTN  - metoprolol, losartan on HOLD     Hyperlipidemia  - statin     Hx CVA  - asa, statin     Hypothyroidism   - sytnhroid      Constipation  - trial lactulose, bowel regimen      FEN: regular diet, PT/OT  Proph: SCDs, heparin gtt  Code status: Full code      Dispo - cont to monitor           3/31 Pulmonary  Acute hypoxic resp failure -- secondary to COPD exac triggered by RSV infection and now with secondary bacterial PNA.  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  May be due to aspiration given patient's previous poor swallow eval.  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 Jones.  Dysphagia- currently NPO. However, family has no intention of pursuing PEG and pt is hungry/thirsty.  We discussed that GOC conversation should occur; if they want to provide feeding, then they need to accept possible consequence of recurrent aspiration events.  - pt is more awake and appropriate today- will get speech evaluation  Proph- on hep gtt for afib; can't reliably take oral anticoagulants currently  Dispo -- DNAR/full. No intubation          3/31 Nephrology  Cr better   Speech rec NPO    QUINTON  -prerenal due to volume depletion, Radha 9  -No recent IV contrast  Hypernatremia  -SNa corrects to 152 meq/L for hyperglycemia   Anemia  -Iron deficient  Hypertension  Diastolic CHF  -PTA regimen: Lasix 40 twice daily, metoprolol 12.5 mg twice daily, losartan 100 mg daily  COPD exacerbation with RSV bronchitis        SCr improved with 0.9NS  Change IVF to D5W   IV iron  Hold BP Rx  Compensated  Pulmonary following          Lab 03/26/24  0933 03/27/24  0656 03/28/24  0701 03/29/24  0411 03/30/24  0616   WBC 9.0 16.1* 14.6* 7.3 16.3*   HGB 11.7* 10.2* 10.4* 10.1* 9.0*   MCV 83.7 78.5* 79.1* 79.7* 80.4   .0 304.0 335.0 268.0 239.0   BAND  --   --   --  49 44                 Recent Labs   Lab 03/26/24  0933 03/27/24  0656 03/28/24  0701 03/29/24  0411 03/30/24  0616    135* 136 139 146*   K 4.8 4.4 5.0 4.4 3.5    103 104 105 112   CO2 29.0 26.0 26.0 27.0 27.0   BUN 25* 42* 64* 73* 97*   CREATSERUM 1.82* 1.95* 2.15* 2.51* 3.14*   CA 10.0 9.7 9.8 9.8 9.5   MG  --  2.2  --   --   --    * 316* 348* 271* 272*              Recent Labs   Lab 03/26/24  0933 03/27/24  0656   ALT 18 14*   AST 18 14*   ALB 3.4 2.8*                 Recent Labs   Lab 03/30/24  0554 03/30/24  1159 03/30/24  1554 03/30/24  2104 03/31/24  0507   PGLU 323* 427* 237* 279* 230*     Medications 03/26/24 03/27/24 03/28/24 03/29/24 03/30/24 03/31/24 04/01/24   acetylcysteine (Mucomyst) 20 % nebulizer  solution 2 mL  Dose: 2 mL  Freq: 2 times daily Route: Nebulization  Start: 03/29/24 2100   Admin Instructions:   Nebulizer only-per respiratory protocol   Order specific questions:          1847 RU-Given     (2100 RU)-Not Given [C]      0737 ML-Given     2118 NG-Given      0724 HL-Given     1925 EM-Given      0703 RU-Given     2100        acetylcysteine (Mucomyst) 20 % nebulizer solution 2 mL  Dose: 2 mL  Freq: Every 8 hours Route: Nebulization  Start: 03/29/24 1200 End: 03/29/24 1337   Admin Instructions:   Nebulizer only-per respiratory protocol   Order specific questions:          1207 DL-Given     1337-D/C'd         albuterol (Ventolin) (5 MG/ML) 0.5% nebulizer solution 10 mg  Dose: 10 mg  Freq: Once Route: Nebulization  Start: 03/26/24 1102 End: 03/26/24 1112   Admin Instructions:   Administer over 1 hour   Order specific questions:       1112 EO-Given              albuterol (Ventolin) (5 MG/ML) 0.5% nebulizer solution 10 mg  Dose: 10 mg  Freq: Once Route: Nebulization  Start: 03/26/24 0954 End: 03/26/24 1010   Admin Instructions:   Administer over 1 hour   Order specific questions:       1010 LE-Given              apixaban (Eliquis) tab 2.5 mg  Dose: 2.5 mg  Freq: 2 times daily Route: OR  Start: 03/26/24 1430    1449 SCOTT-Given     2053 SC-Given      0935 RS-Given     2128 SC-Given      0833 KG-Given     (2100 SC)-Not Given     2227 BS-Held by provider [C]           1235 MS-Unheld by provider [C]     2009 RK-Held by provider [C]     2100 RK      0900 RK     0929 JS-Unheld by provider [C]     0950 JS-Held by provider [C]     2100 JS      0900 JS     2100 JS      0900 JS     2100 JS        fluticasone furoate-vilanterol (Breo Ellipta) 200-25 MCG/ACT inhaler 1 puff  Dose: 1 puff  Freq: Daily Route: IN  Start: 03/26/24 1345   Admin Instructions:   Administer at the same time every day; do not use more than one inhalation in 24 hours; do not open cover of the inhaler until ready for use (each time cover is opened, 1  dose of medicine is prepared); patient should rinse mouth with water after inhalation and spit rinse solution  RT To Administer First Dose.    1449 SCOTT-Given      1212 RS-Given      0833 KG-Given      0930 KG-Given      0836 RS-Given      1007 PT-Given      0850 HD-Given        furosemide (Lasix) 10 mg/mL injection 40 mg  Dose: 40 mg  Freq: 2 times daily (diuretic) Route: IV  Start: 03/26/24 1700 End: 03/27/24 1001    1715 SCOTT-Given      0936 RS-Given     1001-D/C'd           furosemide (Lasix) tab 40 mg  Dose: 40 mg  Freq: 2 times daily (diuretic) Route: OR  Start: 03/28/24 0900 End: 03/30/24 1300      0833 KG-Given     1240 MS-Held by provider [C]                      0900 MS     1300 AR-Unheld by provider     1300-D/C'd        guaiFENesin ER (Mucinex) 12 hr tab 600 mg  Dose: 600 mg  Freq: 2 times daily Route: OR  Start: 03/26/24 1345   Admin Instructions:   Do not crush    1449 SCOTT-Given     2053 SC-Given      0935 RS-Given     (2100 SC)-Not Given [C]      0833 KG-Given     (2100 SC)-Not Given [C]      (0900 KG)-Not Given     (2100 SD)-Not Given      (0833 RS)-Not Given     (2100 MN)-Not Given      (1007 PT)-Not Given     (2100 LM)-Not Given      (0900 HD)-Not Given     2100        heparin (Porcine) 1000 UNIT/ML injection - BOLUS IV 3,700 Units  Dose: 60 Units/kg  Weight Dosing Info: 62.1 kg  Freq: Once Route: IV  Start: 03/28/24 2230 End: 03/28/24 2341   Admin Instructions:   BOLUS dose for Heparin ACS/A-fib  Maximum bolus dose = 5000 units      2341 SC/LM-Given            heparin (Porcine) 37445 units/250 mL infusion (ACS/AFIB) INITIAL DOSE  Dose: 12 Units/kg/hr  Weight Dosing Info: 62.1 kg  Freq: Once Route: IV  Last Dose: 12 Units/kg/hr (03/30/24 1047)  Start: 03/30/24 1000 End: 03/30/24 1047   Admin Instructions:   Heparin for ACS/Afib  Max initial heparin dose is 1000 units/hour        1047 RS/SCOTT-New Bag          heparin (Porcine) 01049 units/250 mL infusion (ACS/AFIB) INITIAL DOSE  Dose: 12  Units/kg/hr  Weight Dosing Info: 62.1 kg  Freq: Once Route: IV  Last Dose: 12 Units/kg/hr (03/28/24 2341)  Start: 03/28/24 2230 End: 03/28/24 2341   Admin Instructions:   Heparin for ACS/Afib  Max initial heparin dose is 1000 units/hour      2341 SC/LM-New Bag            ipratropium (Atrovent) 0.02 % nebulizer solution 1.5 mg  Dose: 1.5 mg  Freq: Once Route: Nebulization  Start: 03/26/24 0954 End: 03/26/24 1010   Order specific questions:       1010 LE-Given              ipratropium-albuterol (Duoneb) 0.5-2.5 (3) MG/3ML inhalation solution 3 mL  Dose: 3 mL  Freq: 4 times per day Route: Nebulization  Start: 03/29/24 2000   Order specific questions:          1847 RU-Given      0104 CL-Given     0737 ML-Given     1342 ML-Given     2115 NG-Given      0149 LP-Given     0724 HL-Given     1336 HL-Given     1924 EM-Given      (0200 LP)-Not Given     0703 RU-Given     1400     2000        ipratropium-albuterol (Duoneb) 0.5-2.5 (3) MG/3ML inhalation solution 3 mL  Dose: 3 mL  Freq: 6 times per day Route: Nebulization  Start: 03/29/24 0800 End: 03/29/24 1337   Order specific questions:          0855 DL-Given     1157 DL-Given     1337-D/C'd         ipratropium-albuterol (Duoneb) 0.5-2.5 (3) MG/3ML inhalation solution 3 mL  Dose: 3 mL  Freq: Every 6 hours Route: Nebulization  Start: 03/26/24 1345 End: 03/29/24 0353   Order specific questions:       1346 SB-Given     1948 CS-Given      (0200 CS)-Not Given     0820 EM-Given     1359 EM-Given     2030 KT-Given      0241 ER-Given     0744 LE-Given     1318 KH-Given     1943 RU-Given      0255 AG-Given     0353-D/C'd         iron sucrose (Venofer) 20 mg/mL injection 200 mg  Dose: 200 mg  Freq: Daily Route: IV  Start: 03/28/24 1315 End: 04/02/24 0929   Admin Instructions:   doses less than or equal to 200mg may be given slow IV Push over 2 - 5 minutes.      (1315 KG)-Not Given      0934 KG-New Bag      0831 RS-New Bag      0954 PT-New Bag      0850 HD-New Bag        lactulose  (CHRONULAC) 10 GM/15ML solution 30 g  Dose: 30 g  Freq: Every 2 hours Route: OR  Start: 03/29/24 1300 End: 03/29/24 1859       1312 KG-Given     1713 KG-Given     1859-D/C'd  (2000 SD)-Not Given           methylPREDNISolone sodium succinate (Solu-MEDROL) injection 125 mg  Dose: 125 mg  Freq: Once Route: IV  Start: 03/26/24 0954 End: 03/26/24 1009   Admin Instructions:   Reconstitute with 2ml sterile water or saline    1009 KM-Given              methylPREDNISolone sodium succinate (Solu-MEDROL) injection 40 mg  Dose: 40 mg  Freq: Daily Route: IV  Start: 03/30/24 1000   Admin Instructions:   Reconstitute with 1ml sterile water or saline        1005 SCOTT-Given      0954 PT-Given      0850 HD-Given        methylPREDNISolone sodium succinate (Solu-MEDROL) injection 80 mg  Dose: 80 mg  Freq: Every 8 hours Route: IV  Start: 03/26/24 1400 End: 03/27/24 1143   Admin Instructions:   Reconstitute with 2ml sterile water or saline    1400 SCOTT-Given     2053 SC-Given      0516 SC-Given     1143-D/C'd           metoprolol (Lopressor) 5 mg/5mL injection 2.5 mg  Dose: 2.5 mg  Freq: Every 6 hours Route: IV  Start: 03/28/24 2230 End: 03/30/24 0929      2317 SC-Given      (0430 SC)-Not Given     0935 KG-Given     1654 KG-Given     (2230 SD)-Not Given     2303 RK-Held by provider [C]      0430 RK     0929 JS-Unheld by provider     0929-D/C'd  1930 RK-Unheld by provider [C]          metoprolol (Lopressor) 5 mg/5mL injection 5 mg  Dose: 5 mg  Freq: Every 6 hours Route: IV  Start: 03/30/24 1000        1005 SCOTT-Given     1600 SCOTT-Given     2152 MN-Given      0514 MN-Given     0954 PT-Given     1657 PT-Given     2126 LM-Given      0513 LM-Given     0850 HD-Given     1600     2200        metoprolol (Lopressor) 5 mg/5mL injection 5 mg  Dose: 5 mg  Freq: Once Route: IV  Start: 03/28/24 1845 End: 03/28/24 1856      1856 KG-Given            pantoprazole (Protonix) injection 40 mg  Dose: 40 mg  Freq: Every 24 hours Route: IV  Start: 03/30/24 0630    Admin Instructions:   Dilute w/10 ml of NACL. and administer IV push over 2 minutes        0715 SD-Given      0514 MN-Given      0521 LM-Given        piperacillin-tazobactam (Zosyn) 3.375 g in dextrose 5% 100 mL IVPB-ADDV  Dose: 3.375 g  Freq: Every 12 hours Route: IV  Last Dose: 3.375 g (04/01/24 0525)  Start: 03/28/24 1900   Admin Instructions:   Incompatible with Lactated Ringers (LR)   Order specific questions:         1902 KG-New Bag      0516 SC-New Bag     2012 SD-New Bag      0612 SD-New Bag     1729 SCOTT-New Bag      0514 MN-New Bag     1700 PT-New Bag      0525 LM-New Bag     1800        potassium chloride 20 mEq/100mL IVPB premix 20 mEq  Dose: 20 mEq  Freq: Once Route: IV  Last Dose: 20 mEq (04/01/24 0008)  Start: 03/31/24 2300 End: 04/01/24 0208 0008 LM-New Bag        potassium chloride 40 mEq in 250mL sodium chloride 0.9% IVPB premix  Dose: 40 mEq  Freq: Once Route: IV  Last Dose: 40 mEq (03/31/24 1825)  Start: 03/31/24 1730 End: 03/31/24 2225         1825 PT-New Bag         predniSONE (Deltasone) tab 40 mg  Dose: 40 mg  Freq: Daily with breakfast Route: OR  Start: 03/27/24 1145     1212 RS-Given      0833 KG-Given      (0800 KG)-Not Given      0834 RS-Given     0948 JS-Held by provider [C]      0800 JS      0800 JS        sodium chloride 0.9 % IV bolus 250 mL  Dose: 250 mL  Freq: Once Route: IV  Last Dose: 250 mL (03/29/24 0443)  Start: 03/29/24 0430 End: 03/29/24 0513       0443 SC-New Bag           sodium chloride 0.9% infusion  Freq: Once Route: IV  Start: 03/29/24 1700 End: 03/29/24 2302       1657 KG-New Bag           sodium chloride 7 % nebulizer solution 4 mL  Dose: 4 mL  Freq: Every 6 hours Route: Nebulization  Start: 03/31/24 1115   Order specific questions:            1336 HL-Given     1815 KT-Given      (0100 LP)-Not Given     (0515 LP)-Not Given [C]     0703 RU-Given     1112 1385 4064        tamsulosin (Flomax) cap 0.4 mg  Dose: 0.4 mg  Freq: Nightly Route: OR  Start:  03/26/24 2100   Admin Instructions:   Give on an empty stomach. Hold tube feedings 1 hour before AND after.    2304 SC-Given      2128 SC-Given      (2100 SC)-Not Given      (2100 SD)-Not Given      (2100 MN)-Not Given      (2100 LM)-Not Given      2100                    Medications 03/26/24 03/27/24 03/28/24 03/29/24 03/30/24 03/31/24 04/01/24   dextrose 5% infusion  Rate: 83 mL/hr Freq: Continuous Route: IV  Start: 03/31/24 1200         1219 PT-New Bag      0017 LM-New Bag        dilTIAZem (cardIZEM) 100 mg in sodium chloride 0.9% 100 mL IVPB-ADDV  Rate: 2.5-20 mL/hr Dose: 2.5-20 mg/hr  Freq: Continuous Route: IV  Start: 03/29/24 1200 End: 03/29/24 1235   Admin Instructions:   Do not start Diltiazem drip if initial SBP < 90 mmHg or HR < 100    Titration instructions for Cardizem drip:  If after 60 minutes of initiation of drip HR is > 120 beats per minute, RE-BOLUS with 10mg Diltiazem and then increase the continuous drip rate by 5mg/hour, not to exceed 20mg/hr    For patients not taking PO Cardizem:  If heart rate is < 90 for greater than 30 minutes, decrease the drip by 5 mg/hour  If rate < 75 for > 5 min or symptomatic, discontinue the drip and notify physician (cardiology)    Hold Diltiazem drip for SBP < 80 mmHg or symptomatic hypotension and notify physician (cardiology)    For pause greater than 3.0 seconds, hold drip and notify physician (cardiology)    Transitioning from IV Diltiazem to Oral Diltiazem:  If HR is <100 after the patient has received the scheduled ordered oral dose, decrease the drip by 5 mg/hr. Continue to decrease the IV dose hourly by 5 mg/hour until off. For HR <75, turn the intravenous drip off.   Order specific questions:               1235-D/C'd         heparin (Porcine) 31992 units/250mL infusion ACS/AFIB CONTINUOUS  Rate: 2-30 mL/hr Dose: 200-3000 Units/hr  Freq: Continuous Route: IV  Last Dose: 700 Units/hr (03/31/24 1936)  Start: 03/30/24 1600   Admin Instructions:   Heparin for  ACS/Afib        1600 SCOTT/KD-Hi-Risk Rate/Dose Change     1942 SCOTT/MN-Handoff      0736 MN/PT-Handoff     1936 PT/LM-New Bag     1938 PT/LM-Handoff      0713 LM/HD-Handoff        heparin (Porcine) 45586 units/250mL infusion ACS/AFIB CONTINUOUS  Rate: 2-30 mL/hr Dose: 200-3000 Units/hr  Freq: Continuous Route: IV  Last Dose: 500 Units/hr (03/29/24 0627)  Start: 03/29/24 0430 End: 03/29/24 1235   Admin Instructions:   Heparin for ACS/Afib       0530 SC-Stopped [C]     0627 SC/LM-Hi-Risk Restarted     0700 KG/AD-Handoff     1235-D/C'd         lactated ringers infusion  Rate: 50 mL/hr Freq: Continuous Route: IV  Start: 03/28/24 1830 End: 03/29/24 0951      2352 SC-New Bag      0951-D/C'd         sodium chloride 0.45% infusion  Rate: 100 mL/hr Freq: Continuous Route: IV  Start: 03/30/24 0945 End: 03/30/24 1424        1030 SCOTT-New Bag     1424-D/C'd        sodium chloride 0.9% infusion  Rate: 125 mL/hr Freq: Continuous Route: IV  Last Dose: Stopped (03/31/24 1219)  Start: 03/30/24 1430 End: 03/31/24 1148        1458 RS-New Bag     2203 MN-New Bag      0532 MN-New Bag     1148-D/C'd  1219 PT-Stopped                       Medications 03/26/24 03/27/24 03/28/24 03/29/24 03/30/24 03/31/24 04/01/24   acetaminophen (Tylenol) rectal suppository 650 mg  Dose: 650 mg  Freq: Every 6 hours PRN Route: RE  PRN Reason: mild pain  Start: 03/28/24 2127 2202 SC-Given        1758 PT-Given      0010 LM-Given        alum-mag hydroxide-simethicone (Maalox) 200-200-20 MG/5ML oral suspension 30 mL  Dose: 30 mL  Freq: 4 times daily PRN Route: OR  PRN Reason: Indigestion  Start: 03/27/24 2104 2128 SC-Given             benzocaine-menthol (Cepacol) lozenge 1 lozenge  Dose: 1 lozenge  Freq: As needed Route: OR  PRN Reason: sore throat  Start: 03/26/24 1424    2053 SC-Given              benzocaine-menthol (Cepacol) lozenge 1 lozenge  Dose: 1 lozenge  Freq: As needed Route: OR  PRN Reason: sore throat  Start: 03/26/24 1401    1415 SCOTT-Given       0516 SC-Given             benzonatate (Tessalon) cap 200 mg  Dose: 200 mg  Freq: 3 times daily PRN Route: OR  PRN Reason: cough  Start: 03/26/24 1330   Admin Instructions:   Do not crush     0516 SC-Given             bisacodyl (Dulcolax) 10 MG rectal suppository 10 mg  Dose: 10 mg  Freq: Daily as needed Route: RE  PRN Reason: constipation  Start: 03/28/24 1828      2202 SC-Given        1218 PT-Given         glycerin-hypromellose- (Artifical Tears) 0.2-0.2-1 % ophthalmic solution 1 drop  Dose: 1 drop  Freq: 4 times daily PRN Route: Both Eyes  PRN Reason: Dry Eyes  Start: 03/31/24 1647         1722 PT-Given         ipratropium-albuterol (Duoneb) 0.5-2.5 (3) MG/3ML inhalation solution 3 mL  Dose: 3 mL  Freq: Every 4 hours PRN Route: Nebulization  PRN Reason: Wheezing  Start: 03/26/24 1351   Order specific questions:        0555 CS-Given      1557 KH-Given      0025 AG-Given     2145 RU-Given           sennosides (Senokot) tab 8.6 mg  Dose: 8.6 mg  Freq: Daily as needed Route: OR  PRN Comment: For constipation  Start: 03/28/24 0756      0833 KG-Given            sodium chloride (Saline Mist) 0.65 % nasal solution 1 spray  Dose: 1 spray  Freq: Every 3 hours PRN Route: Each Nare  PRN Reason: Dryness  Start: 03/31/24 0653         1247 PT-Given     1758 PT-Given           04/01/24 0800 98.4 °F (36.9 °C) 75 16 140/48 97 % -- High flow nasal cannula 2 L/min TJ   04/01/24 0720 -- -- -- -- -- -- Nasal cannula 2 L/min RU   04/01/24 0500 97.9 °F (36.6 °C) 72 22 150/64 97 % -- Nasal cannula 1 L/min LM   04/01/24 0034 -- -- -- -- 99 % -- Nasal cannula 1 L/min LP   04/01/24 0017 97.5 °F (36.4 °C) 52 18 126/42 99 % -- Nasal cannula 1 L/min    03/31/24 2100 97.7 °F (36.5 °C) 74 18 126/54 96 % -- Nasal cannula 1 L/min    03/31/24 1656 -- 99 -- 158/72 91 % -- -- -- PT   03/31/24 1605 96.8 °F (36 °C) 87 18 153/53 94 % -- Nasal cannula 1 L/min    03/31/24 1352 -- 78 -- -- 100 % -- -- --    03/31/24 1127 -- 73 20 139/56 95 %  -- Nasal cannula 2 L/min PT   03/31/24 0954 -- 80 -- 150/70 94 % -- -- -- PT   03/31/24 0754 97.8 °F (36.6 °C) 89 18 152/44 95 % -- Nasal cannula 2 L/min JE   03/31/24 0600 -- 86 -- -- -- -- -- -- MN   03/31/24 0500 97.8 °F (36.6 °C) 85 18 139/39 97 % -- Nasal cannula 3 L/min KM   03/31/24 0450 -- 104 -- -- -- -- -- -- MN   03/31/24 0155 -- -- -- -- 100 % -- Nasal cannula 3 L/min LP   03/31/24 0149 -- 100 -- -- 96 % -- -- -- LP   03/31/24 0134 -- 84 -- 153/46 96 % -- Nasal cannula 3 L/min MN   03/31/24 0028 97.6 °F (36.4 °C) 80 18 108/41 97 % -- Nasal cannula 3 L/min KM   03/30/24 2149 -- 80 -- 144/47 94 % -- Nasal cannula 3 L/min MN   03/30/24 1935 97.9 °F (36.6 °C) 91 18 142/55 96 % -- Nasal cannula 3 L/min KM   03/30/24 1545 98.3 °F (36.8 °C) 110 18 119/80 94 % -- Nasal cannula 3 L/min TL   03/30/24 1210 98.7 °F (37.1 °C) 106 18 129/69 93 % -- -- -- TL   03/30/24 0823 97.9 °F (36.6 °C) 113 18 129/32 92 % -- -- -- TL   03/30/24 0823 -- -- -- -- -- -- High flow/High humidity 3 L/min SCOTT   03/30/24 0745 -- -- -- -- 98 % -- High flow/High humidity 8 L/min ML   03/30/24 0555 98 °F (36.7 °C) 90 18 113/34 97 % -- High flow/High humidity 10 L/min HT   03/29/24 2330 98.1 °F (36.7 °C) 129 Abnormal  18 108/46 96 % -- High flow/High humidity 10 L/min    03/29/24 2314 -- -- -- 95/41 -- -- -- -- SD   03/29/24 2242 -- -- -- 115/35 -- -- -- -- SD   03/29/24 2238 -- -- -- 98/37 -- -- -- -- SD   03/29/24 2131 -- -- -- -- -- -- -- 10 L/min    03/29/24 2119 -- -- -- -- -- -- -- 7 L/min SD   03/29/24 2117 -- -- -- -- -- -- -- 9 L/min SD   03/29/24 2029 98.6 °F (37 °C) -- -- -- -- -- -- 10 L/min SD   03/29/24 2010 -- -- -- -- -- -- -- 7.5 L/min SD   03/29/24 1930 -- -- 19 -- -- -- High flow nasal cannula 4 L/min CT   03/29/24 1500 96.9 °F (36.1 °C) 103 16 111/30 90 % -- High flow nasal cannula 4 L/min CT   03/29/24 1100 98.9 °F (37.2 °C) 88 18 112/38 97 % -- High flow nasal cannula 4 L/min CT   03/29/24 0800 -- -- -- -- -- -- High  flow nasal cannula 4 L/min DL   03/29/24 0745 98.3 °F (36.8 °C) 92 18 107/39 96 % -- High flow nasal cannula 4 L/min CT   03/29/24 0538 -- 115 20 125/81 92 % -- High flow nasal cannula 5 L/min SC   03/29/24 0350 99.5 °F (37.5 °C) 122 Abnormal  24 100/35 93 % -- High flow nasal cannula 5 L/min SC   03/28/24 2323 100.5 °F (38.1 °C) Abnormal  107 20 131/67 91 % -- Nasal cannula 4 L/min SC   03/28/24 2205 102.4 °F (39.1 °C) Abnormal  104 20 132/54 93 % -- Nasal cannula 4 L/min SC   03/28/24 2031 100.3 °F (37.9 °C) 97 24 122/50 96 % -- Nasal cannula -- DS   03/28/24 2031 -- -- -- -- -- -- -- 4 L/min SC   03/28/24 1540 99.1 °F (37.3 °C) 96 39 Abnormal  159/51 98 % -- None (Room air) -- MW   03/28/24 1400 -- 95 -- -- 92 % -- -- -- MW   03/28/24 1205 97.8 °F (36.6 °C) 91 18 153/45 94 % -- None (Room air) -- MW   03/28/24 0745 98 °F (36.7 °C) 85 18 159/43 93 % -- None (Room air) -- MW   03/28/24 0744 -- 100 18 -- 92 % -- -- -- LE   03/28/24 0536 99.5 °F (37.5 °C) 93 18 140/46 92 % -- None (Room air) -- SC   03/28/24 0035 97.7 °F (36.5 °C) 94 20 133/61 96 % -- None (Room air) -- DS   03/27/24 2133 98.3 °F (36.8 °C) 78 18 128/58 96 % -- None (Room air) -- SC   03/27/24 1145 98.4 °F (36.9 °C) 84 21 129/51 94 % -- None (Room air) -- TJ   03/27/24 1050 -- 84 -- -- -- -- -- -- MD   03/27/24 0556 -- 86 20 -- -- -- -- -- CS   03/27/24 0439 98.3 °F (36.8 °C) 87 2 Abnormal  135/62 95 % -- None (Room air) -- DS   03/26/24 2300 98.5 °F (36.9 °C) 82 18 140/61 97 % -- None (Room air) -- SC   03/26/24 2116 97.5 °F (36.4 °C) 87 18 150/47 98 % -- None (Room air) -- SC   03/26/24 1949 -- 89 20 -- -- -- -- -- CS   03/26/24 1611 97.9 °F (36.6 °C) 90 20 144/59 98 % -- None (Room air) 0 L/min JE   03/26/24 1356 -- -- -- -- -- 137 lb -- -- SCOTT   03/26/24 1230 -- 89 24 134/51 96 % -- None (Room air) -- [facility]   03/26/24 1200 -- 88 23 119/47 100 % -- Aerosol mask -- [facility]   03/26/24 1130 -- 86 26 129/47 100 % -- Aerosol mask -- [facility]   03/26/24 1112 -- 87  30 Abnormal  -- 98 % -- Aerosol mask -- EO   03/26/24 1030 -- 86 24 146/52 100 % -- Aerosol mask -- KMA   03/26/24 1010 -- 80 25 -- 98 % -- -- -- LE   03/26/24 0949 -- -- -- -- -- -- None (Room air) -- [facility]   03/26/24 0917 99.3 °F (37.4 °C) 91 -- 188/69 Abnormal  93 % 137 lb None (Room air) -- BF

## 2024-04-01 NOTE — PROGRESS NOTES
Nephrology Progress Note    Hector Nguyen Attending:  Michele Chatterjee MD       SUBJECTIVE:     Patient seen and examined at bedside.     PHYSICAL EXAM:     Vital Signs: /48 (BP Location: Left arm)   Pulse 75   Temp 98.4 °F (36.9 °C) (Oral)   Resp 16   Ht 5' 7\" (1.702 m)   Wt 137 lb (62.1 kg)   SpO2 97%   BMI 21.46 kg/m²   Temp (24hrs), Av.7 °F (36.5 °C), Min:96.8 °F (36 °C), Max:98.4 °F (36.9 °C)       Intake/Output Summary (Last 24 hours) at 2024 1334  Last data filed at 2024 0945  Gross per 24 hour   Intake --   Output 1250 ml   Net -1250 ml     Wt Readings from Last 3 Encounters:   24 137 lb (62.1 kg)   22 133 lb (60.3 kg)   22 133 lb 2.5 oz (60.4 kg)       General: pleasant, well appearing  HEENT: NCAT  Cardiac: RRR  Lungs: on supplemental oxygen  Abdomen: Soft, non-tender  Extremities: no LE edema  Neurologic/Psych: awake, alert     LABORATORY DATA:     Lab Results   Component Value Date     (H) 2024    BUN 83 (H) 2024    BUNCREA 18.0 02/10/2022    CREATSERUM 2.25 (H) 2024    ANIONGAP 7 2024    GFRNAA 39 (L) 2022    GFRAA 45 (L) 2022    CA 9.5 2024    OSMOCALC 343 (H) 2024    ALKPHO 111 2024    AST 21 2024    ALT 42 2024    BILT 0.9 2024    TP 7.3 2024    ALB 2.2 (L) 2024    GLOBULIN 5.1 (H) 2024     (H) 2024    K 3.7 2024     (H) 2024    CO2 22.0 2024     Lab Results   Component Value Date    WBC 19.1 (H) 2024    RBC 3.85 2024    HGB 9.9 (L) 2024    HCT 31.6 (L) 2024    .0 2024    MCV 82.1 2024    MCH 25.7 (L) 2024    MCHC 31.3 2024    RDW 15.2 2024    NEPRELIM 14.87 (H) 2024    NEUTABS 15.00 (H) 2024    LYMPHABS 0.65 (L) 2024    EOSABS 0.33 2024    BASABS 0.00 2024    NEUT 48 2024    LYMPH 4 2024    MON 2 2024    EOS  2 03/30/2024    BASO 0 03/30/2024    NEPERCENT 87.9 03/28/2024    LYPERCENT 1.9 03/28/2024    MOPERCENT 9.7 03/28/2024    EOPERCENT 0.0 03/28/2024    BAPERCENT 0.1 03/28/2024    NE 12.81 (H) 03/28/2024    LYMABS 0.27 (L) 03/28/2024    MOABSO 1.41 (H) 03/28/2024    EOABSO 0.00 03/28/2024    BAABSO 0.02 03/28/2024     Lab Results   Component Value Date    MALBP 13.3 01/26/2022    CREUR 81.40 03/30/2024     Lab Results   Component Value Date    COLORUR Yellow 02/22/2022    CLARITY Clear 02/22/2022    SPECGRAVITY 1.015 02/22/2022    GLUUR >=500 (A) 02/22/2022    BILUR Negative 02/22/2022    KETUR 20 (A) 02/22/2022    BLOODURINE Moderate (A) 02/22/2022    PHURINE 7.0 02/22/2022    PROUR 30 (A) 02/22/2022    UROBILINOGEN <2.0 02/22/2022    NITRITE Negative 02/22/2022    LEUUR Negative 02/22/2022    WBCUR 1-5 02/22/2022    RBCUR 0-2 02/22/2022    EPIUR None Seen 02/22/2022    BACUR None Seen 02/22/2022     IMAGING:     Reviewed    ASSESSMENT/PLAN:     QUINTON - prerenal due to volume depletion - improving   Hypernatremia  Anemia -iron deficient  Hypertension/diastolic CHF -PTA regimen: Lasix 40 twice daily, metoprolol 12.5 mg twice daily, losartan 100 mg daily  COPD exacerbation with RSV bronchitis        Continue D5W  Encourage PO intake  Repeat BMP this evening to better titrate gtt  Hold home furosemide and losartan  Monitor hyperglycemia with glucocorticoids, D5W gtt  Avoid nephrotoxins and renally dose medications for creatinine clearance        Brigette Troy DO  [facility]

## 2024-04-01 NOTE — OCCUPATIONAL THERAPY NOTE
OCCUPATIONAL THERAPY TREATMENT NOTE - INPATIENT     Room Number: 531/531-A  Session: 1   Number of Visits to Meet Established Goals: 2    Presenting Problem: RSV, COPD exacerbation, bronchiolitis, chronic pleural effusion with trapped lung  Prior Level of Function: Pt is now living with his son and his son's family. Pt has someone with him at all times. Pt stays in his room 24/7 except for coming down the stairs once per month when they have visitors. Pt uses a RW at home. Pt is typically mod independent with toileting. Pt has family assist as needed with showers and dressing and occasionally depends management.     ASSESSMENT   Patient demonstrates fair progress this session, goals remain in progress.    Patient continues to function below baseline with toileting, lower body dressing, static standing balance, dynamic standing balance, functional standing tolerance, and energy conservation strategies.   Contributing factors to remaining limitations include decreased functional strength, decreased endurance, and decreased muscular endurance.  Next session anticipate patient to progress toileting, lower body dressing, static standing balance, dynamic standing balance, and functional standing tolerance.  Occupational Therapy will continue to follow patient for duration of hospitalization.    Patient continues to benefit from continued skilled OT services: at discharge to promote functional independence and safety with additional support and return home with home health OT.          OT Device Recommendations: TBD    History: Patient is a 89 year old male admitted on 3/26/2024 with Presenting Problem: RSV, COPD exacerbation, bronchiolitis, chronic pleural effusion with trapped lung. Co-Morbidities : DM, A-fib, hx CVA    WEIGHT BEARING RESTRICTION  Weight Bearing Restriction: None                  TREATMENT SESSION:  Patient Start of Session: seated in chair; family present  FUNCTIONAL TRANSFER ASSESSMENT  Sit to Stand:  Chair  Chair: Not Tested    BED MOBILITY     BALANCE ASSESSMENT     FUNCTIONAL ADL ASSESSMENT       ACTIVITY TOLERANCE: WFL                         O2 SATURATIONS       EDUCATION PROVIDED  Patient: Role of Occupational Therapy; Plan of Care; Discharge Recommendations  Patient's Response to Education: Verbalized Understanding  Family/Caregiver: Role of Occupational Therapy; Plan of Care; Discharge Recommendations  Family/Caregiver's Response to Education: Verbalized Understanding    THERAPEUTIC EXERCISES  Lower Extremity Ankle pumps  Marching  LAQs     Upper Extremity Shoulder raises  Forward punches  Elbow flexion/extension  Hand grasps     Position Sitting     Repetitions 10   Sets 1       Therapist comments: Pt engages in UE/LE exercises to increase strength, ROM, and activity tolerance required for increased ADL performance, functional transfers, and UE/LE function. During rest periods, pt educated on pursed lip breathing to ease SOB and improve activity tolerance.   Patient End of Session: Up in chair;Needs met;Call light within reach;RN aware of session/findings;All patient questions and concerns addressed;Alarm set;Family present    SUBJECTIVE  Pt's family assisted with communication.    PAIN ASSESSMENT  Ratin  Location: denies        OBJECTIVE  Precautions: Bed/chair alarm    AM-PAC ‘6-Clicks’ Inpatient Daily Activity Short Form  -   Putting on and taking off regular lower body clothing?: A Little  -   Bathing (including washing, rinsing, drying)?: A Little  -   Toileting, which includes using toilet, bedpan or urinal? : A Little  -   Putting on and taking off regular upper body clothing?: A Little  -   Taking care of personal grooming such as brushing teeth?: A Little  -   Eating meals?: None    AM-PAC Score:  Score: 19  Approx Degree of Impairment: 42.8%  Standardized Score (AM-PAC Scale): 40.22    PLAN  OT Treatment Plan: Balance activities;Energy conservation/work simplification techniques;ADL  training;Functional transfer training;Endurance training;Patient/Family education;Patient/Family training;Equipment eval/education;Compensatory technique education;Continued evaluation  Rehab Potential : Good  Frequency: Daily    OT Goals:     All goals ongoing 04/01    ADL Goals  Patient will perform toileting with supervision and AE PRN  Patient will perform LB dressing with min A and AE PRN     Functional Transfer Goals  Patient will perform bed mobility supine to sit with supervision  Patient will perform bed mobility sit to supine with supervision  Patient will perform toilet transfer with supervision    OT Session Time: 15 minutes  Therapeutic Exercise: 15 minutes

## 2024-04-01 NOTE — VIDEO SWALLOW STUDY NOTE
ADULT VIDEOFLUOROSCOPIC SWALLOWING STUDY    Admission Date: 3/26/2024  Evaluation Date: 04/01/24  Radiologist: Brigette    RECOMMENDATIONS   Diet Recommendations - Solids: Puree  Diet Recommendations - Liquids: Nectar thick liquids/ Mildly thick    Further Follow-up:  Follow Up Needed (Documentation Required): Yes  SLP Follow-up Date: 04/02/24  Compensatory Strategies Recommended: Small bites and sips;Alternate consistencies;Multiple swallows (chin tuck)  Aspiration Precautions: Upright position  Medication Administration Recommendations: Crushed in puree  Treatment Plan/Recommendations: Dysphagia therapy    HISTORY   Background/Objective Information: Patient is a 89 y/o male known to this service for prior dysphagia assessment and management. VFSS recommended for further evaluation. See prior SLP notes for full history.    Problem List  Principal Problem:    COPD exacerbation (HCC)  Active Problems:    Pleural effusion on right    Acute bronchiolitis due to respiratory syncytial virus (RSV)      Past Medical History  Past Medical History:   Diagnosis Date    Abnormal CXR 3/30/2015    Arthritis of both knees 2/27/2015    Advanced, xrays 2013    Asthma (HCC)     Diabetes (HCC)     Diabetes mellitus (HCC) 2/27/2015    Disorder of thyroid     High blood pressure     High cholesterol     HTN (hypertension), benign 2/27/2015    Hydropneumothorax 2/17/2022    Hyperlipidemia 2/27/2015    Hypothyroidism due to acquired atrophy of thyroid 4/6/2015    Ischemic stroke (HCC)     LVH (left ventricular hypertrophy) 2/27/2015    Osteoarthritis     Shortness of breath     Solitary pulmonary nodule on lung CT 5/1/2015    A 3.5 mm nodular opacity in the caudal and posterior lateral corner of the posterior segment of the right upper lobe (image 113 of series 3) represents a nonspecific finding.       Current Diet Consistency: NPO  Prior Level of Function: Independent     History of Recent: Difficulty breathing  Precautions:  Aspiration  Imaging results:   CXR 3/29/24  CONCLUSION:  No significant interval change compared to 3/28/2024.  Bilateral compliant opacities and moderate right pleural effusion are again identified.  These could reflect areas of multifocal pneumonia.  There is also a 19 mm nodular opacity in the   lateral right mid lung field which will require radiographic follow-up to evaluate for lung malignancy.  Other etiologies are also within the differential.         LOCATION:  [facility]                  Dictated by (CST): Keven Green MD on 3/29/2024 at 10:08 PM       Finalized by (CST): Keven Green MD on 3/29/2024 at 10:11 PM     Reason for Referral: R/O aspiration    Family/Patient Goals: none stated     ASSESSMENT   DYSPHAGIA ASSESSMENT  Test completed in conjunction with Radiologist.  Patient Positioned: Upright;Midline.  Patient Viewed: Lateral.  Patient Alertness: Fully alert.  Consistencies Presented: Thin liquids;Nectar thick liquids/ Mildly thick;Puree;Soft solid to assess oropharyngeal swallow function and assess for compensatory strategies to improve safe swallow function.    THIN LIQUIDS  Method of Presentation: Cup  Oral Phase of Swallow (VFSS - Thin Liquids): Within Functional Limits  Triggered at: Base of tongue  Residue Severity, Location: Mild/Mod;Valleculae;Pyriform sinuses  Cleared/Reduced with: Multiple swallows  Laryngeal Penetration: During the swallow  Tracheal Aspiration: None  Cough/Throat Clear Response: Yes  Cough/Throat Clear Effective: Partially  NECTAR THICK LIQUIDS/ MILDLY THICK  Method of Presentation: Cup  Oral Phase of Swallow (VFSS - Nectar Thick Liquids): Within Functional Limits  Triggered at: Base of tongue  Residue Severity, Location: Mild/Mod;Valleculae;Pyriform Sinuses  Cleared/Reduced with: Multiple swallows  Laryngeal Penetration: Trace  Tracheal Aspiration: None  Cough/Throat Clear Response: Yes  Cough/Throat Clear Effective: Yes  Strategy(ies) Implemented (Nectar  Thick): Chin tuck  Effectiveness: Yes     PUREE  Oral Phase of Swallow (VFSS - Puree): Within Functional Limits  Triggered at: Base of tongue  Residue Severity, Location: Moderate;Valleculae;Pyriform sinuses  Cleared/Reduced with: Multiple swallows;Liquid assist  Laryngeal Penetration: During the swallow (suspect residue from thin liquid trial)  Tracheal Aspiration: None  Cough/Throat Clear Response: Yes  SOFT SOLID  Oral Phase of Swallow (VFSS - Soft Solid): Impaired  Mastication (VFSS - Soft Solid): Impaired  Triggered at: Base of tongue  Residue Severity, Location: Moderate;Valleculae;Pyriform sinuses  Cleared/Reduced with: Multiple swallows;Liquid assist  Laryngeal Penetration: None  Tracheal Aspiration: None     Penetration Aspiration Scale Score: Score 4: Material enters the airway, contacts the vocal folds, and is ejected from the airway       Overall Impression:   Patient presented with moderate oropharyngeal dysphagia characterized by impaired mastication, reduced base of tongue retraction, incomplete epiglottic inversion, and reduced hyolaryngeal excursion. Bolus acceptance was adequate without evidence of anterior bolus loss. Mastication of soft solids was prolonged and incomplete requiring liquid assist. Pharyngeal swallow initiation was timely across consistencies. Mild penetration to the level of the TVF noted with thin liquids during the swallow. Delayed cough and throat clear were effective at preventing aspiration, but did not clear penetration material from laryngeal vestibule. Trace penetration observed with mildly thick liquids, but was reduced in volume in depth when compared to thin liquids. Vallecular and pyriform sinus residue observed across consistencies and patient consistently initiated multiple swallows per bolus. This was effective in reducing pharyngeal residue. Use of liquid wash and chin tuck also reduced severity of pharyngeal residue. Of note, patient with chronic cough throughout  exam despite absence of aspiration.    Recommend patient initiate a pureed diet and mildly thick liquids. Bolus size and rate of intake should be limited. Recommend alternating solids and liquids, as well as, utilizing chin tuck to reduce pharyngeal residue. Medication should be crushed and administered in a pureed bolus. SLP will continue to follow to monitor diet tolerance and initiate dysphagia therapy. Education provided to patient and family at bedside following exam. All questions answered to apparent satisfaction.              GOALS  Goal #1 VFSS  Met   Goal #2 The patient will tolerate puree consistency and mildly thick liquids without overt signs or symptoms of aspiration with 95 % accuracy over 1-2 session(s).    In Progress   Goal #3 The patient will utilize compensatory strategies as outlined by  VFSS including Small bites, Small sips, Multiple swallows, Alternate liquids/solids, Chin tuck with mild assistance 90 % of the time across 2 sessions.  In Progress   Goal #4 Patient will complete pharyngeal strengthening exercises with 90% accuracy when provided mild cues within 3 visits.    In Progress   Goal #5     Goal #6     Goal #7     Goal #8         EDUCATION/INSTRUCTION  Reviewed results and recommendations with patient/family/caregiver.  Agreement/Understanding verbalized and all questions answered to their apparent satisfaction.    INTERDISCIPLINARY COMMUNICATION  Reviewed results with Radiologist; agreement verbalized.    Patient Experiencing Pain: No                FOLLOW UP  Treatment Plan/Recommendations: Dysphagia therapy       Thank you for your referral.   If you have any questions, please contact JULIETA Baxter

## 2024-04-01 NOTE — PROGRESS NOTES
Problem: COPD exacerbation     Data: Pt is A&Ox4. Speaks english but primarily Hebrew speaking. Family at bedside.  on 1L , RA base. On tele. NPO. Video swallow to be done. However family requesting it to be done on Tuesday because pt has been really tired and weak. Will relay this request to dayshift RN. Brief and primofit in place. C/D/I/ c/o headache PRN tylenol. uP X2 r.w.    Intervention: Hep gtt, PTT AM, IVF, Potassium replaced, IV abxs.  Nebs, CPT, steroids.     Edu: Pt and family updatd on POC. VSS so far. Family at bedside. Call light within reach.

## 2024-04-01 NOTE — PROGRESS NOTES
03/31/24 1597   Provider Notification   Reason for Communication Family request   Provider Name Other (comment)  (Dr. Luis)   Method of Communication Page       Family requesting for me to page, they are wondering if the dextrose infusion needs to be readjusted since sodium and glucose are eleavted. They are also wondering if soidum and potassium labs should be checked overnight. Explained to family that dextrose was just started and potassium is still infusing and that pt has BMP for tomorrow morning.     Per nephrology labs can be checked in AM.

## 2024-04-01 NOTE — PROGRESS NOTES
Pulmonary Progress Note        NAME: Hector Nguyen - ROOM: [address] [place], [place] [address]A - MRN: [id number] - Age: 89 year old - : 1932        Last 24hrs: No events overnight, getting video swallow today    OBJECTIVE:  Vitals:    24 0017 24 0034 24 0500 24 0800   BP: 126/42  150/64 140/48   BP Location: Right arm  Right arm Left arm   Pulse: 52  72 75   Resp: 18  22 16   Temp: 97.5 °F (36.4 °C)  97.9 °F (36.6 °C) 98.4 °F (36.9 °C)   TempSrc: Oral  Oral Oral   SpO2: 99% 99% 97% 97%   Weight:       Height:           Oxygen Therapy  SpO2: 97 %  O2 Device: High flow nasal cannula  O2 Flow Rate (L/min): 2 L/min  Pulse Oximetry Type: Continuous  Oximetry Probe Site Changed: No  Pulse Ox Probe Location: Right hand                  Intake/Output Summary (Last 24 hours) at 2024 0939  Last data filed at 2024 0534  Gross per 24 hour   Intake --   Output 1150 ml   Net -1150 ml       Scheduled Medication:   insulin degludec  20 Units Subcutaneous Nightly    insulin aspart  4-20 Units Subcutaneous TID AC and HS    sodium chloride  4 mL Nebulization q6h    pantoprazole  40 mg Intravenous Q24H    metoprolol  5 mg Intravenous Q6H    methylPREDNISolone  40 mg Intravenous Daily    dilTIAZem  10 mg Intravenous Once    ipratropium-albuterol  3 mL Nebulization 4 times per day    acetylcysteine  2 mL Nebulization BID    iron sucrose  200 mg Intravenous Daily    piperacillin-tazobactam  3.375 g Intravenous q12h    insulin aspart  10 Units Subcutaneous Once    [Held by provider] predniSONE  40 mg Oral Daily with breakfast    atorvastatin  40 mg Oral Nightly    fluticasone furoate-vilanterol  1 puff Inhalation Daily    levothyroxine  50 mcg Oral Daily    metoprolol tartrate  12.5 mg Oral 2x Daily(Beta Blocker)    guaiFENesin ER  600 mg Oral BID    [Held by provider] apixaban  2.5 mg Oral BID    tamsulosin  0.4 mg Oral Nightly     Continuous Infusing Medication:   dextrose 83 mL/hr at 24 0017    continuous dose  heparin 700 Units/hr (03/31/24 1936)       Lungs: rales anteriorly and in right base  Heart: S1, S2 normal, no murmur, click, rub or gallop, regular rate and rhythm  Abdomen: soft, non-tender; bowel sounds normal; no masses,  no organomegaly  Extremities: edema trace in shan LE    Labs reviewed as noted below        ASSESSMENT/PLAN:    Acute hypoxic resp failure -- secondary to COPD exac triggered by RSV infection and now with secondary bacterial PNA.  -on 3L nc  - Has significant mucous plugging and inability to clear secretions- cont CPT, suctioning prn,   -pulm toilet  AECOPD- secondary to RSV with secondary bacterial pneumonia.  May be due to aspiration given patient's previous poor swallow eval.  -IV steroid burst; transitioned to IV bc of poor swallowing/dysphagia  -Breo  -BD protocol  Pleural Effusion -- chronic R effusion- unchanged  -tapped in past with trapped lung  -would not rec thora at this time  Pneumonia- likely aspiration- as above  - MRSA swab negative  -zosyn (3/29-)   - sputum cx if possible  -cont saline and duonebs Q8  Abn CXR -- there is a R lateral pleural based nodular density on the CXR that does not appear to have been present in the past  -will need out patient follow up with Dr. Jones.  Dysphagia- NPO per speech  -family does not wish to pursue PEG  Proph- on hep gtt for afib; can't reliably take oral anticoagulants currently  Dispo -- DNAR/select. No intubation  -discussed with family at bedside.  -will follow      Kerwin Britt  [facility]  Pulmonary and Critical Care

## 2024-04-02 LAB
ANION GAP SERPL CALC-SCNC: 7 MMOL/L (ref 0–18)
APTT PPP: 68.6 SECONDS (ref 23.3–35.6)
BUN BLD-MCNC: 72 MG/DL (ref 9–23)
CALCIUM BLD-MCNC: 8.7 MG/DL (ref 8.5–10.1)
CHLORIDE SERPL-SCNC: 114 MMOL/L (ref 98–112)
CO2 SERPL-SCNC: 22 MMOL/L (ref 21–32)
CREAT BLD-MCNC: 1.94 MG/DL
EGFRCR SERPLBLD CKD-EPI 2021: 32 ML/MIN/1.73M2 (ref 60–?)
ERYTHROCYTE [DISTWIDTH] IN BLOOD BY AUTOMATED COUNT: 15.6 %
GLUCOSE BLD-MCNC: 164 MG/DL (ref 70–99)
GLUCOSE BLD-MCNC: 189 MG/DL (ref 70–99)
GLUCOSE BLD-MCNC: 191 MG/DL (ref 70–99)
GLUCOSE BLD-MCNC: 288 MG/DL (ref 70–99)
GLUCOSE BLD-MCNC: 309 MG/DL (ref 70–99)
HCT VFR BLD AUTO: 30.8 %
HGB BLD-MCNC: 10 G/DL
MCH RBC QN AUTO: 26 PG (ref 26–34)
MCHC RBC AUTO-ENTMCNC: 32.5 G/DL (ref 31–37)
MCV RBC AUTO: 80 FL
OSMOLALITY SERPL CALC.SUM OF ELEC: 322 MOSM/KG (ref 275–295)
PLATELET # BLD AUTO: 199 10(3)UL (ref 150–450)
POTASSIUM SERPL-SCNC: 3.9 MMOL/L (ref 3.5–5.1)
RBC # BLD AUTO: 3.85 X10(6)UL
SODIUM SERPL-SCNC: 143 MMOL/L (ref 136–145)
WBC # BLD AUTO: 19.3 X10(3) UL (ref 4–11)

## 2024-04-02 PROCEDURE — 92526 ORAL FUNCTION THERAPY: CPT

## 2024-04-02 PROCEDURE — 87070 CULTURE OTHR SPECIMN AEROBIC: CPT | Performed by: INTERNAL MEDICINE

## 2024-04-02 PROCEDURE — 85730 THROMBOPLASTIN TIME PARTIAL: CPT | Performed by: INTERNAL MEDICINE

## 2024-04-02 PROCEDURE — 87077 CULTURE AEROBIC IDENTIFY: CPT | Performed by: INTERNAL MEDICINE

## 2024-04-02 PROCEDURE — 97116 GAIT TRAINING THERAPY: CPT

## 2024-04-02 PROCEDURE — 87186 SC STD MICRODIL/AGAR DIL: CPT | Performed by: INTERNAL MEDICINE

## 2024-04-02 PROCEDURE — 94640 AIRWAY INHALATION TREATMENT: CPT

## 2024-04-02 PROCEDURE — 87205 SMEAR GRAM STAIN: CPT | Performed by: INTERNAL MEDICINE

## 2024-04-02 PROCEDURE — 82962 GLUCOSE BLOOD TEST: CPT

## 2024-04-02 PROCEDURE — 97530 THERAPEUTIC ACTIVITIES: CPT

## 2024-04-02 PROCEDURE — 94667 MNPJ CHEST WALL 1ST: CPT

## 2024-04-02 PROCEDURE — 80048 BASIC METABOLIC PNL TOTAL CA: CPT | Performed by: INTERNAL MEDICINE

## 2024-04-02 PROCEDURE — 85027 COMPLETE CBC AUTOMATED: CPT | Performed by: INTERNAL MEDICINE

## 2024-04-02 PROCEDURE — C9113 INJ PANTOPRAZOLE SODIUM, VIA: HCPCS | Performed by: HOSPITALIST

## 2024-04-02 RX ORDER — POLYETHYLENE GLYCOL 3350 17 G/17G
17 POWDER, FOR SOLUTION ORAL DAILY PRN
Status: DISCONTINUED | OUTPATIENT
Start: 2024-04-02 | End: 2024-04-06

## 2024-04-02 RX ORDER — LACTULOSE 10 G/15ML
20 SOLUTION ORAL DAILY PRN
Status: DISCONTINUED | OUTPATIENT
Start: 2024-04-02 | End: 2024-04-06

## 2024-04-02 RX ORDER — PREDNISONE 20 MG/1
20 TABLET ORAL
Status: DISCONTINUED | OUTPATIENT
Start: 2024-04-03 | End: 2024-04-04

## 2024-04-02 RX ORDER — GUAIFENESIN 400 MG/1
400 TABLET ORAL
Status: DISCONTINUED | OUTPATIENT
Start: 2024-04-02 | End: 2024-04-06

## 2024-04-02 NOTE — PAYOR COMM NOTE
--------------  CONTINUED STAY REVIEW  4/1  Payor: [employer] POS/MCNP  Subscriber #:  [id number]  Authorization Number: [id number]    Admit date: 3/29/24  Admit time: 11:15 AM    Admitting Physician: Everardo Woo MD  Attending Physician:  Michele Chatterjee MD  Primary Care Physician: Sp Myers MD    REVIEW DOCUMENTATION:  4/1  Lab 03/28/24  0701 03/29/24  0411 03/30/24  0616 03/31/24  0710 04/01/24  0644   WBC 14.6* 7.3 16.3* 21.9* 19.1*   HGB 10.4* 10.1* 9.0* 9.6* 9.9*   MCV 79.1* 79.7* 80.4 81.3 82.1   .0 268.0 239.0 240.0 229.0   BAND  --  49 44  --   --                    Recent Labs   Lab 03/27/24  0656 03/28/24  0701 03/29/24  0411 03/30/24  0616 03/31/24  0710 03/31/24  1741 04/01/24  0644   *   < > 139 146* 150* 149* 150*   K 4.4   < > 4.4 3.5 3.4* 2.9* 3.7      < > 105 112 119* 115* 121*   CO2 26.0   < > 27.0 27.0 26.0 25.0 22.0   BUN 42*   < > 73* 97* 96* 91* 83*   CREATSERUM 1.95*   < > 2.51* 3.14* 2.71* 2.51* 2.25*   CA 9.7   < > 9.8 9.5 9.3 9.6 9.5   MG 2.2  --   --   --   --   --   --    *   < > 271* 272* 229* 324* 235*    < > = values in this interval not displayed.               Recent Labs   Lab 03/26/24  0933 03/27/24  0656 03/31/24  1741   ALT 18 14* 42   AST 18 14* 21   ALB 3.4 2.8* 2.2*                 Recent Labs   Lab 03/31/24  0507 03/31/24  1124 03/31/24  1637 03/31/24  2056 04/01/24  0526   PGLU 230* 226* 302* 316* 307*     Continuous Infusions:    dextrose 83 mL/hr at 04/01/24 0017    continuous dose heparin 700 Units/hr (03/31/24 1936)      Assessment/Plan:  Principal Problem:    COPD exacerbation (HCC)  Active Problems:    Pleural effusion on right    Acute bronchiolitis due to respiratory syncytial virus (RSV)     89 year old male with PMH sig for COPD, paroxysmal atrial fibrillation, hypertension, CKD4, hyperlipidemia, diabetes mellitus type 2, history of CVA, hypothyroidism, stable 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  Dw daughter      Dysphagia  - make NPO, video today  - seen by SLP earlier in admission     Essential HTN  - metoprolol, losartan on HOLD     Hyperlipidemia  - statin     Hx CVA  - asa, statin     Hypothyroidism   - sytnhroid      Constipation  - trial lactulose, bowel regimen      FEN: regular diet, PT/OT  Proph: SCDs, heparin gtt  Code status: Full code      Dispo - cont to monitor           4/1 Nephrology  ASSESSMENT/PLAN:      QUINTON - prerenal due to volume depletion - improving   Hypernatremia  Anemia -iron deficient  Hypertension/diastolic CHF -PTA regimen: Lasix 40 twice daily, metoprolol 12.5 mg twice daily, losartan 100 mg daily  COPD exacerbation with RSV bronchitis        Continue D5W  Encourage PO intake  Repeat BMP this evening to better titrate gtt  Hold home furosemide and losartan  Monitor hyperglycemia with glucocorticoids, D5W gtt  Avoid nephrotoxins and renally dose medications for creatinine clearance           4/1 Pulmonary  ASSESSMENT/PLAN:     Acute hypoxic resp  failure -- secondary to COPD exac triggered by RSV infection and now with secondary bacterial PNA.  -on 3L nc  - Has significant mucous plugging and inability to clear secretions- cont CPT, suctioning prn,   -pulm toilet  AECOPD- secondary to RSV with secondary bacterial pneumonia.  May be due to aspiration given patient's previous poor swallow eval.  -IV steroid burst; transitioned to IV bc of poor swallowing/dysphagia  -Breo  -BD protocol  Pleural Effusion -- chronic R effusion- unchanged  -tapped in past with trapped lung  -would not rec thora at this time  Pneumonia- likely aspiration- as above  - MRSA swab negative  -zosyn (3/29-)   - sputum cx if possible  -cont saline and duonebs Q8  Abn CXR -- there is a R lateral pleural based nodular density on the CXR that does not appear to have been present in the past  -will need out patient follow up with Dr. Jones.  Dysphagia- NPO per speech  -family does not wish to pursue PEG  Proph- on hep gtt for afib; can't reliably take oral anticoagulants currently  Dispo -- DNAR/select. No intubation  -discussed with family at bedside.             MEDICATIONS ADMINISTERED IN LAST 1 DAY:  acetaminophen (Tylenol) tab 325-650 mg       Date Action Dose Route User    4/1/2024 2140 Given 500 mg Oral Sharmila Samayoa RN          acetylcysteine (Mucomyst) 20 % nebulizer solution 2 mL       Date Action Dose Route User    4/2/2024 0919 Given 2 mL Nebulization Renaldo Rose RCP    4/1/2024 2042 Given 2 mL Nebulization Aurelio Lunsford, KAIN          atorvastatin (Lipitor) tab 40 mg       Date Action Dose Route User    4/1/2024 2023 Given 40 mg Oral Sharmila Samayoa RN          heparin (Porcine) 17718 units/250mL infusion ACS/AFIB CONTINUOUS       Date Action Dose Route User    4/2/2024 0846 New Bag 700 Units/hr Intravenous Marilee Berrios, RN          dextrose 5% infusion       Date Action Dose Route User    4/2/2024 0822 New Bag (none) Intravenous Marilee Berrios, KIESHA     4/1/2024 2132 Rate/Dose Change (none) Intravenous Sahrmila Samayoa RN    4/1/2024 1333 Rate/Dose Change (none) Intravenous Raven Gore RN    4/1/2024 1313 New Bag (none) Intravenous Donna Rollins RN    4/1/2024 1112 Rate/Dose Change (none) Intravenous Donna Rollins RN          fluticasone furoate-vilanterol (Breo Ellipta) 200-25 MCG/ACT inhaler 1 puff       Date Action Dose Route User    4/2/2024 0821 Given 1 puff Inhalation Marilee Berrios RN          insulin aspart (NovoLOG) 100 Units/mL FlexPen 4-20 Units       Date Action Dose Route User    4/2/2024 0501 Given 8 Units Subcutaneous (Left Lower Abdomen) Sharmila Samayoa RN    4/1/2024 2017 Given 12 Units Subcutaneous (Right Lower Abdomen) Sharmila Samayoa RN    4/1/2024 1723 Given 16 Units Subcutaneous (Left Upper Arm) Donna Rollins RN    4/1/2024 1311 Given 12 Units Subcutaneous (Right Upper Arm) Donna Rollins RN          insulin degludec 100 units/mL flextouch 20 Units       Date Action Dose Route User    4/1/2024 2016 Given 20 Units Subcutaneous (Left Lower Abdomen) Sharmila Samayoa RN          ipratropium-albuterol (Duoneb) 0.5-2.5 (3) MG/3ML inhalation solution 3 mL       Date Action Dose Route User    4/1/2024 1318 Given 3 mL Nebulization Gonsalo Keane RCP          ipratropium-albuterol (Duoneb) 0.5-2.5 (3) MG/3ML inhalation solution 3 mL       Date Action Dose Route User    4/2/2024 0918 Given 3 mL Nebulization Renaldo Rose RCP    4/1/2024 2042 Given 3 mL Nebulization Aurelio Lunsford RCP          levothyroxine (Synthroid) tab 50 mcg       Date Action Dose Route User    4/2/2024 0822 Given 50 mcg Oral Marilee Berrios RN          methylPREDNISolone sodium succinate (Solu-MEDROL) injection 20 mg       Date Action Dose Route User    4/2/2024 0822 Given 20 mg Intravenous Marilee Berrios, KIESHA          metoprolol (Lopressor) 5 mg/5mL injection 5 mg       Date Action Dose Route User    4/2/2024 0459 Given 5 mg  Intravenous Sharmila Samayoa RN    4/1/2024 2010 Given 5 mg Intravenous Sharmila Samayoa RN    4/1/2024 1723 Given 5 mg Intravenous Donna Rollins, KIESHA          pantoprazole (Protonix) injection 40 mg       Date Action Dose Route User    4/2/2024 0458 Given 40 mg Intravenous Sharmila Samayoa RN          piperacillin-tazobactam (Zosyn) 3.375 g in dextrose 5% 100 mL IVPB-ADDV       Date Action Dose Route User    4/2/2024 0456 New Bag 3.375 g Intravenous Sharmila Samayoa RN    4/1/2024 1723 New Bag 3.375 g Intravenous Donna Rollins, KIESHA          sodium chloride (Saline Mist) 0.65 % nasal solution 1 spray       Date Action Dose Route User    4/2/2024 0821 Given 1 spray Each Nare Marilee Berrios, KIESHA          sodium chloride 7 % nebulizer solution 4 mL       Date Action Dose Route User    4/2/2024 0920 Given 4 mL Nebulization Renaldo Rose, KAIN          tamsulosin (Flomax) cap 0.4 mg       Date Action Dose Route User    4/1/2024 2023 Given 0.4 mg Oral Sharmila Samayoa RN            Vitals (last day)       Date/Time Temp Pulse Resp BP SpO2 Weight O2 Device O2 Flow Rate (L/min) [facility]    04/02/24 0745 -- 63 -- 120/57 97 % -- High flow nasal cannula 1 L/min     04/02/24 0730 -- 70 -- -- 98 % -- High flow nasal cannula 1 L/min     04/02/24 0506 97.9 °F (36.6 °C) -- 18 -- -- -- Nasal cannula 2 L/min     04/01/24 2316 97.4 °F (36.3 °C) -- 18 111/38 -- -- Nasal cannula 2 L/min     04/01/24 1946 97 °F (36.1 °C) 68 18 153/51 98 % -- Nasal cannula 2 L/min     04/01/24 1545 97.7 °F (36.5 °C) 57 18 154/63 93 % -- Nasal cannula 2 L/min     04/01/24 0800 98.4 °F (36.9 °C) 75 16 140/48 97 % -- High flow nasal cannula 2 L/min TJ    04/01/24 0720 -- -- -- -- -- -- Nasal cannula 2 L/min RU    04/01/24 0500 97.9 °F (36.6 °C) 72 22 150/64 97 % -- Nasal cannula 1 L/min LM    04/01/24 0034 -- -- -- -- 99 % -- Nasal cannula 1 L/min LP    04/01/24 0017 97.5 °F (36.4 °C) 52 18 126/42 99 % -- Nasal cannula 1 L/min KD

## 2024-04-02 NOTE — PLAN OF CARE
Patient is A&Ox3. Vital signs wnl, afebrile. Weaned to 1L, maintaining O2 sats 95-99%. . RA is baseline. Weaning O2 as tolerated. Patients continues to report dyspnea at rest and with ambulation. Encouraged to perform deep breathing, IS, and flutter valve exercises. Patient also reports constipation. Home bowel regimen resumed. Heparin gtt dc'ed, switched over to eliquis. Dietician consulted for nutritional supplements. Patient ambulated to the bathroom and cerna with PT. Family at bedside, participating in care and aware of the plan of care. Rounded on frequently. Will continue to monitor patient closely.    Problem: RESPIRATORY - ADULT  Goal: Achieves optimal ventilation and oxygenation  Description: INTERVENTIONS:  - Assess for changes in respiratory status  - Assess for changes in mentation and behavior  - Position to facilitate oxygenation and minimize respiratory effort  - Oxygen supplementation based on oxygen saturation or ABGs  - Provide Smoking Cessation handout, if applicable  - Encourage broncho-pulmonary hygiene including cough, deep breathe, Incentive Spirometry  - Assess the need for suctioning and perform as needed  - Assess and instruct to report SOB or any respiratory difficulty  - Respiratory Therapy support as indicated  - Manage/alleviate anxiety  - Monitor for signs/symptoms of CO2 retention  4/2/2024 1606 by Marilee Berrios, RN  Outcome: Progressing  4/2/2024 1606 by Marilee Berrios, RN  Outcome: Progressing     Problem: Patient/Family Goals  Goal: Patient/Family Long Term Goal  Description: Patient's Long Term Goal: discharge home    Interventions:  - See additional Care Plan goals for specific interventions  4/2/2024 1606 by Marilee Berrios, RN  Outcome: Progressing  4/2/2024 1606 by Marilee Berrios, RN  Outcome: Progressing  Goal: Patient/Family Short Term Goal  Description: Patient's Short Term Goal:   3/28: Maintain safety  3/29: maintain  safety  03/29/2024 - Blood sugar control  3/30 AM - Wean O2  3/30 noc: decrease sob  4/1 am: GO FOR VIDEO SWALLOW AND PASS - EAT  4/2 am: have a bowel movement      Interventions:   - insulin as ordered  - supp O2, nebs, Flutter  - See additional Care Plan goals for specific interventions  4/2/2024 1606 by Marilee Berrios, RN  Outcome: Progressing  4/2/2024 1606 by Marilee Berrios, RN  Outcome: Progressing     Problem: CARDIOVASCULAR - ADULT  Goal: Maintains optimal cardiac output and hemodynamic stability  Description: INTERVENTIONS:  - Monitor vital signs, rhythm, and trends  - Monitor for bleeding, hypotension and signs of decreased cardiac output  - Evaluate effectiveness of vasoactive medications to optimize hemodynamic stability  - Monitor arterial and/or venous puncture sites for bleeding and/or hematoma  - Assess quality of pulses, skin color and temperature  - Assess for signs of decreased coronary artery perfusion - ex. Angina  - Evaluate fluid balance, assess for edema, trend weights  Outcome: Progressing  Goal: Absence of cardiac arrhythmias or at baseline  Description: INTERVENTIONS:  - Continuous cardiac monitoring, monitor vital signs, obtain 12 lead EKG if indicated  - Evaluate effectiveness of antiarrhythmic and heart rate control medications as ordered  - Initiate emergency measures for life threatening arrhythmias  - Monitor electrolytes and administer replacement therapy as ordered  Outcome: Progressing     Problem: GASTROINTESTINAL - ADULT  Goal: Maintains or returns to baseline bowel function  Description: INTERVENTIONS:  - Assess bowel function  - Maintain adequate hydration with IV or PO as ordered and tolerated  - Evaluate effectiveness of GI medications  - Encourage mobilization and activity  - Obtain nutritional consult as needed  - Establish a toileting routine/schedule  - Consider collaborating with pharmacy to review patient's medication profile  Outcome:  Progressing  Goal: Maintains adequate nutritional intake (undernourished)  Description: INTERVENTIONS:  - Monitor percentage of each meal consumed  - Identify factors contributing to decreased intake, treat as appropriate  - Assist with meals as needed  - Monitor I&O, WT and lab values  - Obtain nutritional consult as needed  - Optimize oral hygiene and moisture  - Encourage food from home; allow for food preferences  - Enhance eating environment  Outcome: Progressing

## 2024-04-02 NOTE — PROGRESS NOTES
AO x3/4. Irdu speaking. Family at bedside to help translate. 2L at rest. BL is RA. Nebs. CPT. Tele - Afib. Heparin gtt running at 7, AM draw. Primofit. Incontinent. Briefed. Generalized pain, Tylenol given as requested. Up x2 walker. Zosyn. Solumedrol. QID accucheck. Nectar thick liquids, pureed diet. Takes pills crushed in applesauce. IVF rate was modified d/t Sodium level per nephrology. Daughter aware. Pt resting in bed with call light in reach. No further needs at this moment.

## 2024-04-02 NOTE — PHYSICAL THERAPY NOTE
PHYSICAL THERAPY TREATMENT NOTE - INPATIENT    Room Number: 531/531-A       Session: 2    Number of Visits to Meet Established Goals: 5    Presenting Problem: SOB, COPD, RSV  Co-Morbidities : DM, A-fib, hx CVA    ASSESSMENT   Patient demonstrates good  progress this session, goals  remain in progress.    Patient continues to function below baseline with transfers, gait, and standing prolonged periods.  Contributing factors to remaining limitations include decreased functional strength, decreased endurance/aerobic capacity, impaired standing  balance, and decreased muscular endurance.  Next session anticipate patient to progress transfers, gait, and standing prolonged periods.  Physical Therapy will continue to follow patient for duration of hospitalization.    Patient continues to benefit from continued skilled PT services: at discharge to promote functional independence and safety with additional support and return home with home health PT.    PLAN  PT Treatment Plan: Energy conservation;Gait training;Bed mobility;Endurance;Strengthening;Transfer training  Rehab Potential : Fair  Frequency (Obs): 3-5x/week    CURRENT GOALS       Goal #1 Patient is able to demonstrate supine - sit EOB @ level: modified independent      Goal #2 Patient is able to demonstrate transfers EOB to/from Chair/Wheelchair at assistance level: modified independent      Goal #3 Patient is able to ambulate 50 feet with assist device: walker - rolling at assistance level: supervision      Goal #4 Pt will negotiate one flight of stairs with railing with cga   Goal #5     Goal #6       2024 all goals ongoing     SUBJECTIVE    \"I am doing fine\"    OBJECTIVE  Precautions: Bed/chair alarm    WEIGHT BEARING RESTRICTION  Weight Bearing Restriction: None                PAIN ASSESSMENT   Ratin  Location: L knee  Management Techniques: Activity promotion;Body mechanics;Repositioning    BALANCE                                                                                                                        Static Sitting: Fair +  Dynamic Sitting: Fair           Static Standing: Fair  Dynamic Standing: Fair -    ACTIVITY TOLERANCE                         O2 WALK  Oxygen Therapy  SPO2% on Room Air at Rest: 90  SPO2% Ambulation on Room Air: 90  SPO2% Ambulation on Oxygen: 95  Ambulation oxygen flow (liters per minute): 2      AM-PAC '6-Clicks' INPATIENT SHORT FORM - BASIC MOBILITY  How much difficulty does the patient currently have...  Patient Difficulty: Turning over in bed (including adjusting bedclothes, sheets and blankets)?: A Little   Patient Difficulty: Sitting down on and standing up from a chair with arms (e.g., wheelchair, bedside commode, etc.): A Little   Patient Difficulty: Moving from lying on back to sitting on the side of the bed?: A Little   How much help from another person does the patient currently need...   Help from Another: Moving to and from a bed to a chair (including a wheelchair)?: A Little   Help from Another: Need to walk in hospital room?: A Little   Help from Another: Climbing 3-5 steps with a railing?: A Little       AM-PAC Score:  Raw Score: 18   Approx Degree of Impairment: 46.58%   Standardized Score (AM-PAC Scale): 43.63   CMS Modifier (G-Code): CK    FUNCTIONAL ABILITY STATUS  Gait Assessment   Functional Mobility/Gait Assessment  Gait Assistance: Contact guard assist  Distance (ft): 12 - 20 - 10  Assistive Device: Rolling walker  Pattern: Shuffle (heavy reliance on RW)    Skilled Therapy Provided    Bed Mobility:  Rolling: NT   Supine<>Sit: NT   Sit<>Supine: NT     Transfer Mobility:  Sit<>Stand: SBA   Stand<>Sit: SBA   Gait: CGA RW    Therapist's Comments: Extended time required for family/caregiver training.  Pt's family given ADL inventory handout and cloth gait belt.     Pt's family preferred O2, d/t WOB.        Patient End of Session: Up in chair;Needs met;Call light within reach;RN aware of session/findings;All  patient questions and concerns addressed;Alarm set;Family present    PT Session Time: 25 minutes  Gait Training: 15 minutes  Therapeutic Activity: 30 minutes  Therapeutic Exercise: 0 minutes   Neuromuscular Re-education: 0 minutes

## 2024-04-02 NOTE — PROGRESS NOTES
Nephrology Progress Note    Hector Nguyen Attending:  Michele Chatterjee MD       SUBJECTIVE:     Patient seen and examined at bedside.     PHYSICAL EXAM:     Vital Signs: /47   Pulse 74   Temp 97.7 °F (36.5 °C) (Oral)   Resp 18   Ht 5' 7\" (1.702 m)   Wt 137 lb (62.1 kg)   SpO2 95%   BMI 21.46 kg/m²   Temp (24hrs), Av.5 °F (36.4 °C), Min:97 °F (36.1 °C), Max:97.9 °F (36.6 °C)       Intake/Output Summary (Last 24 hours) at 2024 1431  Last data filed at 2024 1200  Gross per 24 hour   Intake 358 ml   Output --   Net 358 ml     Wt Readings from Last 3 Encounters:   24 137 lb (62.1 kg)   22 133 lb (60.3 kg)   22 133 lb 2.5 oz (60.4 kg)       General: pleasant, well appearing  HEENT: NCAT  Cardiac: RRR  Lungs: on supplemental oxygen  Abdomen: Soft, non-tender  Extremities: trace LE edema  Neurologic/Psych: awake, alert     LABORATORY DATA:     Lab Results   Component Value Date     (H) 2024    BUN 72 (H) 2024    BUNCREA 18.0 02/10/2022    CREATSERUM 1.94 (H) 2024    ANIONGAP 7 2024    GFRNAA 39 (L) 2022    GFRAA 45 (L) 2022    CA 8.7 2024    OSMOCALC 322 (H) 2024    ALKPHO 109 2024    AST 15 2024    ALT 35 2024    BILT 0.9 2024    TP 6.7 2024    ALB 2.1 (L) 2024    GLOBULIN 4.6 (H) 2024     2024    K 3.9 2024     (H) 2024    CO2 22.0 2024     Lab Results   Component Value Date    WBC 19.3 (H) 2024    RBC 3.85 2024    HGB 10.0 (L) 2024    HCT 30.8 (L) 2024    .0 2024    MCV 80.0 2024    MCH 26.0 2024    MCHC 32.5 2024    RDW 15.6 2024    NEPRELIM 14.87 (H) 2024    NEUTABS 15.00 (H) 2024    LYMPHABS 0.65 (L) 2024    EOSABS 0.33 2024    BASABS 0.00 2024    NEUT 48 2024    LYMPH 4 2024    MON 2 2024    EOS 2 2024    BASO 0  03/30/2024    NEPERCENT 87.9 03/28/2024    LYPERCENT 1.9 03/28/2024    MOPERCENT 9.7 03/28/2024    EOPERCENT 0.0 03/28/2024    BAPERCENT 0.1 03/28/2024    NE 12.81 (H) 03/28/2024    LYMABS 0.27 (L) 03/28/2024    MOABSO 1.41 (H) 03/28/2024    EOABSO 0.00 03/28/2024    BAABSO 0.02 03/28/2024     Lab Results   Component Value Date    MALBP 13.3 01/26/2022    CREUR 81.40 03/30/2024     Lab Results   Component Value Date    COLORUR Yellow 02/22/2022    CLARITY Clear 02/22/2022    SPECGRAVITY 1.015 02/22/2022    GLUUR >=500 (A) 02/22/2022    BILUR Negative 02/22/2022    KETUR 20 (A) 02/22/2022    BLOODURINE Moderate (A) 02/22/2022    PHURINE 7.0 02/22/2022    PROUR 30 (A) 02/22/2022    UROBILINOGEN <2.0 02/22/2022    NITRITE Negative 02/22/2022    LEUUR Negative 02/22/2022    WBCUR 1-5 02/22/2022    RBCUR 0-2 02/22/2022    EPIUR None Seen 02/22/2022    BACUR None Seen 02/22/2022     IMAGING:     Reviewed    ASSESSMENT/PLAN:     QUINTON on CKD (baseline 1.5-1.7) - prerenal due to volume depletion - improving   Hypernatremia  Anemia -iron deficient  Hypertension/diastolic CHF -PTA regimen: Lasix 40 twice daily, metoprolol 12.5 mg twice daily, losartan 100 mg daily  COPD exacerbation with RSV bronchitis        Continue D5W at low rate  Encourage PO intake  Will consider diuretics if worsening volume overload  Hold home furosemide and losartan for now  Monitor hyperglycemia  Avoid nephrotoxins and renally dose medications for creatinine clearance        DO Hieu HernadezChesapeake Regional [facility]

## 2024-04-02 NOTE — SLP NOTE
SPEECH DAILY NOTE - INPATIENT    ASSESSMENT & PLAN   ASSESSMENT  Pt seen for dysphagia tx to assess tolerance with recommended diet, ensure proper utilization of aspiration precautions and provide pt/family education.  Patient received alert in bed with multiple family members present at bedside. Patient reported good tolerance of PO intake following VFSS yesterday, but also endorsed limited PO intake. Results and recommendations of VFSS were reviewed. Education provided to patient and family re: pharyngeal strengthening exercises; Effortful swallow and Roula. Patient was able to complete both exercises with mild-moderate verbal and visual cues. Written instructions provided to patient and family. Encouraged patient to complete 2-3x/day. Recommend patient continue current diet. SLP will continue to follow per POC.    Diet Recommendations - Solids: Puree  Diet Recommendations - Liquids: Nectar thick liquids/ Mildly thick    Compensatory Strategies Recommended: Small bites and sips;Alternate consistencies;Multiple swallows (chin tuck)  Aspiration Precautions: Upright position  Medication Administration Recommendations: Crushed in puree    Patient Experiencing Pain: No                Treatment Plan  Treatment Plan/Recommendations: Dysphagia therapy    Interdisciplinary Communication: Discussed with RN            GOALS  Goal #1 VFSS  Met   Goal #2 The patient will tolerate puree consistency and mildly thick liquids without overt signs or symptoms of aspiration with 95 % accuracy over 1-2 session(s).     In Progress   Goal #3 The patient will utilize compensatory strategies as outlined by  VFSS including Small bites, Small sips, Multiple swallows, Alternate liquids/solids, Chin tuck with mild assistance 90 % of the time across 2 sessions.  In Progress   Goal #4 Patient will complete pharyngeal strengthening exercises with 90% accuracy when provided mild cues within 3 visits.     In Progress   Goal #5       Goal #6        Goal #7       Goal #8            FOLLOW UP  Follow Up Needed (Documentation Required): Yes  SLP Follow-up Date: 04/03/24       Session: 1 following VFSS    If you have any questions, please contact JULIETA Baxter

## 2024-04-02 NOTE — PROGRESS NOTES
Pulmonary Progress Note        NAME: Hector Nguyen - ROOM: [address] [place] [address] - MRN: [id number] - Age: 89 year old - : 1932        Last 24hrs: No events overnight, passed video swallow on , remains on O2    OBJECTIVE:  Vitals:    24 2316 24 0506 24 0730 24 0745   BP: 111/38   120/57   BP Location: Right arm   Left arm   Pulse:   70 63   Resp: 18 18     Temp: 97.4 °F (36.3 °C) 97.9 °F (36.6 °C)     TempSrc: Oral Oral     SpO2:   98% 97%   Weight:       Height:           Oxygen Therapy  SpO2: 97 %  O2 Device: High flow nasal cannula  O2 Flow Rate (L/min): 1 L/min  Pulse Oximetry Type: Continuous  Oximetry Probe Site Changed: No  Pulse Ox Probe Location: Right hand                  Intake/Output Summary (Last 24 hours) at 2024 0922  Last data filed at 2024 0945  Gross per 24 hour   Intake --   Output 100 ml   Net -100 ml       Scheduled Medication:   insulin degludec  20 Units Subcutaneous Nightly    insulin aspart  4-20 Units Subcutaneous TID AC and HS    sodium chloride  4 mL Nebulization Q8H DIONI    ipratropium-albuterol  3 mL Nebulization Q8H DIONI    methylPREDNISolone  20 mg Intravenous Daily    pantoprazole  40 mg Intravenous Q24H    metoprolol  5 mg Intravenous Q6H    dilTIAZem  10 mg Intravenous Once    acetylcysteine  2 mL Nebulization BID    iron sucrose  200 mg Intravenous Daily    piperacillin-tazobactam  3.375 g Intravenous q12h    insulin aspart  10 Units Subcutaneous Once    [Held by provider] predniSONE  40 mg Oral Daily with breakfast    atorvastatin  40 mg Oral Nightly    fluticasone furoate-vilanterol  1 puff Inhalation Daily    levothyroxine  50 mcg Oral Daily    metoprolol tartrate  12.5 mg Oral 2x Daily(Beta Blocker)    guaiFENesin ER  600 mg Oral BID    [Held by provider] apixaban  2.5 mg Oral BID    tamsulosin  0.4 mg Oral Nightly     Continuous Infusing Medication:   dextrose 100 mL/hr at 24 0822    continuous dose heparin 700 Units/hr (24 0846)        Lungs: rales anteriorly and in right base  improved w/ coughing  Heart: S1, S2 normal, no murmur, click, rub or gallop, regular rate and rhythm  Abdomen: soft, non-tender; bowel sounds normal; no masses,  no organomegaly  Extremities: edema trace in shan LE        Labs reviewed as noted below        ASSESSMENT/PLAN:    Acute hypoxic resp failure -- secondary to COPD exac triggered by RSV infection and now with secondary bacterial PNA.  -on 3L nc  - Has significant mucous plugging and inability to clear secretions- cont CPT, suctioning prn,   -pulm toilet  AECOPD- secondary to RSV with secondary bacterial pneumonia.  May be due to aspiration given patient's previous poor swallow eval.  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 Jones.  Dysphagia- diet per speech  -family does not wish to pursue PEG  Proph-eliquis  Dispo -- DNAR/select. No intubation  -discussed with family at bedside.  -will follow      Kerwin Britt  [facility]  Pulmonary and Critical Care

## 2024-04-02 NOTE — CM/SW NOTE
Met with patient, three daughters (one is an internist doctor) at bedside to discuss discharge planning. Discussed HH, they were agreeable. They shared that patient will be going to son's home in [place] and they are interested in RN/OT/PT and HHA. Will follow up with HH providers to see if service that area or if new referral needs to be sent. They inquired on homemaker or caregiver services. Son's address is in the [facility] area. Added resources to AVS and will provide family with information. Discussed DME, daughter requested hospital bed and home oxygen if medically appropriate. He already has grab bars, raised toilet seat, RW, cane, and wheelchair.    Spoke with Therese from [facility] to see if they are able to service in [facility]. Sent messages to the two other accepting HH agencies to check as well.    Sent referral for hospital bed in [facility]. Await approval.    Son (Satinder) Address: [address] [place] [address], [place] [address]        Hospital bed  On 4/2 I had a face to face encounter with Hector Nguyen for a semi electric hospital bed medical necessity evaluation.  Patient requires a semi-electric hospital bed at home due to history of CVA, arthritis, and COPD.  Positioning in an ordinary bed will be insufficient and requires frequent and/or immediate changes in body positions. A variable height bed surface is required to permit safe transfer from the hospital to a chair, wheelchair, or for stand and pivot transfer.  It is in my opinion that a semi-electric hospital bed is reasonable and necessary.        Arlene Bautista, [facility]  Discharge Planner  [phone]

## 2024-04-02 NOTE — PROGRESS NOTES
[facility]  Hospitalist Progress Note                                                                     [facility]   part of [facility] GABE Nguyen  12/8/1932    SUBJECTIVE:    Sitting in chair, dozing off at times.  Family at bedside.  Passed video swallow!.  Poor appetite.  Breathing stable.              OBJECTIVE:  Temp:  [97 °F (36.1 °C)-97.9 °F (36.6 °C)] 97.7 °F (36.5 °C)  Pulse:  [57-74] 74  Resp:  [18] 18  BP: (111-154)/(38-63) 138/47  SpO2:  [93 %-98 %] 95 %  Exam  Gen: No acute distress, alert and oriented x3, no focal neurologic deficits  Pulm: intermittnt romchi , NO wheezing.    CV: Heart with regular rate and rhythm, no murmur.  Normal PMI.    Abd: Abdomen soft, nontender, nondistended, no organomegaly, bowel sounds present  MSK: Full range of motion in extremities, no clubbing, no cyanosis  Skin: + edema to thighs    Labs:   Recent Labs   Lab 03/29/24  0411 03/30/24  0616 03/31/24  0710 04/01/24  0644 04/02/24  0644   WBC 7.3 16.3* 21.9* 19.1* 19.3*   HGB 10.1* 9.0* 9.6* 9.9* 10.0*   MCV 79.7* 80.4 81.3 82.1 80.0   .0 239.0 240.0 229.0 199.0   BAND 49 44  --   --   --        Recent Labs   Lab 03/27/24  0656 03/28/24  0701 03/31/24  0710 03/31/24  1741 04/01/24  0644 04/01/24  1958 04/02/24  0644   *   < > 150* 149* 150* 146* 143   K 4.4   < > 3.4* 2.9* 3.7 3.5 3.9      < > 119* 115* 121* 117* 114*   CO2 26.0   < > 26.0 25.0 22.0 22.0 22.0   BUN 42*   < > 96* 91* 83* 76* 72*   CREATSERUM 1.95*   < > 2.71* 2.51* 2.25* 2.09* 1.94*   CA 9.7   < > 9.3 9.6 9.5 9.1 8.7   MG 2.2  --   --   --   --   --   --    *   < > 229* 324* 235* 209* 189*    < > = values in this interval not displayed.       Recent Labs   Lab 03/27/24  0656 03/31/24  1741 04/01/24 1958   ALT 14* 42 35   AST 14* 21 15   ALB 2.8* 2.2* 2.1*       Recent Labs   Lab 04/01/24  1234 04/01/24  1643 04/01/24 2016 04/02/24  0502  04/02/24  1112   PGLU 230* 296* 231* 191* 164*       Meds:   Scheduled:    apixaban  2.5 mg Oral BID    [START ON 4/3/2024] predniSONE  20 mg Oral Daily with breakfast    piperacillin-tazobactam  3.375 g Intravenous Q8H    insulin degludec  20 Units Subcutaneous Nightly    insulin aspart  4-20 Units Subcutaneous TID AC and HS    sodium chloride  4 mL Nebulization Q8H DIONI    ipratropium-albuterol  3 mL Nebulization Q8H DIONI    pantoprazole  40 mg Intravenous Q24H    metoprolol  5 mg Intravenous Q6H    dilTIAZem  10 mg Intravenous Once    acetylcysteine  2 mL Nebulization BID    insulin aspart  10 Units Subcutaneous Once    [Held by provider] predniSONE  40 mg Oral Daily with breakfast    atorvastatin  40 mg Oral Nightly    fluticasone furoate-vilanterol  1 puff Inhalation Daily    levothyroxine  50 mcg Oral Daily    metoprolol tartrate  12.5 mg Oral 2x Daily(Beta Blocker)    guaiFENesin ER  600 mg Oral BID    tamsulosin  0.4 mg Oral Nightly     Continuous Infusions:    dextrose 100 mL/hr at 04/02/24 0822     PRN: lactulose, polyethylene glycol (PEG 3350), sodium chloride, glycerin-hypromellose-, sennosides, bisacodyl, glucose **OR** glucose **OR** glucose-vitamin C **OR** dextrose **OR** glucose **OR** glucose **OR** glucose-vitamin C, acetaminophen, alum-mag hydroxide-simethicone, acetaminophen, melatonin, ondansetron, metoclopramide, benzonatate, ipratropium-albuterol, benzocaine-menthol, acetaminophen, benzocaine-menthol    Assessment/Plan:  Principal Problem:    COPD exacerbation (HCC)  Active Problems:    Pleural effusion on right    Acute bronchiolitis due to respiratory syncytial virus (RSV)    89 year old male with PMH sig for COPD, paroxysmal atrial fibrillation, hypertension, CKD4, hyperlipidemia, diabetes mellitus type 2, history of CVA, hypothyroidism, 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     Acute hypoxic respiratory failure   Acute  COPD exacerbation 2/2 RSV Bronchitis  Increase R lung infiltrate, likely secondary bacterial PNA, possible aspiration pna  - convert back to po steroids starting tomorrow  (dose given this am)  - 02 increased to 10 L 3/29, now stable on 1 L NC this am  - started zosyn 3/29- to complete 7 d course  - scheduled nebuilzers, add hypertonic saline   - breo, BD protocol  - supportive care  - pulm following     Sepsis from above- resolved  - treat PNA, zosyn  - plan as above      DM2 w steroid induced hyperglycemia  - cont  degludec from 12 u-> 15 u nightly-> 20 u nightly   - increased to high dose ssi    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  - stable     QUINTON on CKD stage 4- resolved  - BLE edema  - IVF, renal consult appreciated   - hold ARB, restart when ok w renal    Anemia, chronic  - likely from CKD, slight downtrend noted  - venofer      PAF  - convert back to oral meds, off heparin gtt    Dysphagia  -passed video: zoe w nectar thcik liquids     Essential HTN  - metoprolol, losartan on HOLD     Hyperlipidemia  - statin     Hx CVA  - asa, statin     Hypothyroidism   - sytnhroid     Constipation  - trial lactulose, bowel regimen      FEN: regular diet, PT/OT  Proph: SCDs, heparin gtt  Code status: Full code     Dispo - cont to monitor       [facility]  Internal Medicine  Answering Service number: [phone]      Hospital bed  On 4/2 I had a face to face encounter with Hector Nguyen for a semi electric hospital bed medical necessity evaluation.  Patient requires a semi-electric hospital bed at home due to history of CVA, arthritis, and COPD.  Positioning in an ordinary bed will be insufficient and requires frequent and/or immediate changes in body positions. A variable height bed surface is required to permit safe transfer from the hospital to a chair, wheelchair, or for stand and pivot transfer.  It is in my opinion that a semi-electric hospital bed is reasonable  and necessary.

## 2024-04-02 NOTE — DIETARY MALNUTRITION NOTE
[facility]   part of [facility]  NUTRITION ASSESSMENT    Pt meets moderate malnutrition criteria at this time.    CRITERIA FOR MALNUTRITION DIAGNOSIS:  Criteria for non-severe malnutrition diagnosis: chronic illness related to wt loss 10% in 6 months and muscle mass mild depletion    NUTRITION INTERVENTION:    RD nutrition Care Plan- Initiated ONS (oral nutritional supplements), Ordered multivitamin, and See RD nutrition assessment for additional recommendations  Meal and Snacks - Continue Pureed diet with nectar thick liquids as tolerated; monitor patient po intake. Encourage adequate po of appropriate diet.  Medical Food Supplements - RD added Glucerna vanilla TID (send with NTL packets). Rationale/use for oral supplements discussed.  Vitamin and Mineral Supplements - Recommend adding Chewable MVI  Coordination of Nutrition Care - Recommend SLP consult prior to diet advancement.    PATIENT STATUS: 89 year old male admitted on 3/26 presents with COPD exacerbation. Pt screened d/t low BMI, consult for \"Poor po intake/appetite, supplement recs, diabetic\", and elevated A1c 9.5%. Visited pt at bedside with family member present who provided hx. She reports pt had a pretty good appetite PTA but has been decreased during admit mostly d/t weakness. Reports didn't eat anything yesterday but had some liquids today for bfast. SLP performed VFSS yesterday and recommends pureed solids and nectar thick liquids. Denies nausea, vomiting and diarrhea but has chronic constipation; last BM yesterday. NKFA but follows vegetarian diet (no egg/fish). Family reports pt's wt ~6 mo ago was around 153 lbs and currently weighs 137 lbs. Discussed ONS options and family requesting vanilla Glucerna. Discussed this would have to be thickened by RN or family which they are agreeable. Continued to encourage PO intake; all questions answered at this time.    PMH: Arthritis of both knees, Asthma, Diabetes, High cholesterol, HTN,  Hydropneumothorax, Hyperlipidemia, Hypothyroidism, Ischemic stroke, LVH, Osteoarthritis.    ANTHROPOMETRICS:  Ht: 170.2 cm (5' 7\")  Wt: 62.1 kg (137 lb).   BMI: Body mass index is 21.46 kg/m².  IBW: 67.3 kg    WEIGHT HISTORY: Family reports ~16 lb wt loss x 6 months (10%, significant per standards).  Patient Weight(s) for the past 336 hrs:   Weight   03/26/24 1356 62.1 kg (137 lb)   03/26/24 0917 62.1 kg (137 lb)       Wt Readings from Last 10 Encounters:   03/26/24 62.1 kg (137 lb)   05/23/22 60.3 kg (133 lb)   02/23/22 60.4 kg (133 lb 2.5 oz)   02/17/22 62.1 kg (136 lb 14.5 oz)   02/10/22 62.6 kg (138 lb)   01/26/22 69.6 kg (153 lb 6.4 oz)   05/13/21 70.8 kg (156 lb)   03/31/21 67.6 kg (149 lb)   03/31/21 67.6 kg (149 lb 0.5 oz)   02/05/21 72.6 kg (160 lb)        NUTRITION:  Diet:       Procedures    Regular/General diet Fluid Consistency: Nectar Thick / Mildly Thick Liquids; Texture Consistency: Pureed; Is Patient on Accuchecks? Yes        Percent Meals Eaten (last 3 days)       None            Food Allergies: No  Cultural/Ethnic/Latter-day Preferences Addressed: Yes    GI SYSTEM REVIEW: swallowing dysfunction and constipation; last BM 4/2  Skin/Wounds: WNL    NUTRITION RELATED PHYSICAL FINDINGS:     1. Body Fat/Muscle Mass: mild muscle depletion Temple region and Clavicle region     2. Fluid Accumulation: none per RN documentation     NUTRITION PRESCRIPTION: 62.1 kg  Calories: 2654-7523 calories/day (25-30 kcal/kg)  Protein: 62-93 grams protein/day (1.0-1.5 gm/kg)  Fluid: ~1 ml/kcal or per MD discretion    NUTRITION DIAGNOSIS/PROBLEM:  Malnutrition related to insufficient appetite resulting in inadequate nutrition intake as evidenced by documented/reported unintentional weight loss and loss of muscle mass    MONITOR AND EVALUATE/NUTRITION GOALS:  PO intake of 75% of meals TID - New  PO intake of 75% of oral nutrition supplement/s - New  Weight stable within 1 to 2 lbs during admission -  New      MEDICATIONS:  Novolog, degludec 20 units nightly, synthroid, protonix, abx, flomax, prn lactulose  Gtt: D5W at 50 ml/hr    LABS:  , POC glucose x 24hrs: 191-296 mg/dl, A1c = 9.5%    Pt is at High nutrition risk    Jeannine Matthew RD, LDN, [facility]  Clinical Dietitian  Spectra: [phone]

## 2024-04-02 NOTE — DISCHARGE INSTRUCTIONS
Sometimes managing your health at home requires assistance.  The [facility] team has recognized your preference to use [facility] (previously known as [facility]), Phone: [phone], Fax: [phone]. A representative from the home health agency will contact you or your family to schedule your first visit.        [facility] - hospital bed and private pay oxygen  Phone: [phone]  Fax: [phone]      [place] Resources:   [email or web address]/[email or web address]_resources/    [facility] services the [facility] portion of [facility] Senior Services:   [email or web address]-Services    [facility] Services:  https://[employer].[place].gov/AGE/ProviderProfileSearch/Search/FacilitySearchResults    [facility]   [address] [place] [address]  Phone: [phone]  Fax: [phone]    [employer] resources - not covered by insurance/can reach out to them for cost.  https://www.alexandro.com/us/en/kasey/products/kasey-primofit.html  https://www.[employer]andro.com/us/en/portfolios/medical-surgical-equipment/urine-management.html

## 2024-04-03 LAB
ANION GAP SERPL CALC-SCNC: 6 MMOL/L (ref 0–18)
APTT PPP: 39.8 SECONDS (ref 23.3–35.6)
BUN BLD-MCNC: 48 MG/DL (ref 9–23)
CALCIUM BLD-MCNC: 8.6 MG/DL (ref 8.5–10.1)
CHLORIDE SERPL-SCNC: 112 MMOL/L (ref 98–112)
CO2 SERPL-SCNC: 26 MMOL/L (ref 21–32)
CREAT BLD-MCNC: 1.6 MG/DL
EGFRCR SERPLBLD CKD-EPI 2021: 40 ML/MIN/1.73M2 (ref 60–?)
ERYTHROCYTE [DISTWIDTH] IN BLOOD BY AUTOMATED COUNT: 15.4 %
GLUCOSE BLD-MCNC: 138 MG/DL (ref 70–99)
GLUCOSE BLD-MCNC: 182 MG/DL (ref 70–99)
GLUCOSE BLD-MCNC: 205 MG/DL (ref 70–99)
GLUCOSE BLD-MCNC: 271 MG/DL (ref 70–99)
GLUCOSE BLD-MCNC: 327 MG/DL (ref 70–99)
HCT VFR BLD AUTO: 33.5 %
HGB BLD-MCNC: 10.5 G/DL
MCH RBC QN AUTO: 25.4 PG (ref 26–34)
MCHC RBC AUTO-ENTMCNC: 31.3 G/DL (ref 31–37)
MCV RBC AUTO: 80.9 FL
OSMOLALITY SERPL CALC.SUM OF ELEC: 313 MOSM/KG (ref 275–295)
PLATELET # BLD AUTO: 226 10(3)UL (ref 150–450)
POTASSIUM SERPL-SCNC: 3.6 MMOL/L (ref 3.5–5.1)
RBC # BLD AUTO: 4.14 X10(6)UL
SODIUM SERPL-SCNC: 144 MMOL/L (ref 136–145)
WBC # BLD AUTO: 20.7 X10(3) UL (ref 4–11)

## 2024-04-03 PROCEDURE — C9113 INJ PANTOPRAZOLE SODIUM, VIA: HCPCS | Performed by: HOSPITALIST

## 2024-04-03 PROCEDURE — 94640 AIRWAY INHALATION TREATMENT: CPT

## 2024-04-03 PROCEDURE — 85730 THROMBOPLASTIN TIME PARTIAL: CPT | Performed by: INTERNAL MEDICINE

## 2024-04-03 PROCEDURE — 94668 MNPJ CHEST WALL SBSQ: CPT

## 2024-04-03 PROCEDURE — 85027 COMPLETE CBC AUTOMATED: CPT | Performed by: INTERNAL MEDICINE

## 2024-04-03 PROCEDURE — 82962 GLUCOSE BLOOD TEST: CPT

## 2024-04-03 PROCEDURE — 94664 DEMO&/EVAL PT USE INHALER: CPT

## 2024-04-03 PROCEDURE — 80048 BASIC METABOLIC PNL TOTAL CA: CPT | Performed by: INTERNAL MEDICINE

## 2024-04-03 RX ORDER — FUROSEMIDE 40 MG/1
40 TABLET ORAL DAILY
Status: DISCONTINUED | OUTPATIENT
Start: 2024-04-03 | End: 2024-04-04

## 2024-04-03 RX ORDER — ALBUTEROL SULFATE 90 UG/1
2 AEROSOL, METERED RESPIRATORY (INHALATION) EVERY 6 HOURS PRN
Status: DISCONTINUED | OUTPATIENT
Start: 2024-04-03 | End: 2024-04-06

## 2024-04-03 NOTE — PLAN OF CARE
Problem: RESPIRATORY - ADULT  Goal: Achieves optimal ventilation and oxygenation  Description: INTERVENTIONS:  - Assess for changes in respiratory status  - Assess for changes in mentation and behavior  - Position to facilitate oxygenation and minimize respiratory effort  - Oxygen supplementation based on oxygen saturation or ABGs  - Provide Smoking Cessation handout, if applicable  - Encourage broncho-pulmonary hygiene including cough, deep breathe, Incentive Spirometry  - Assess the need for suctioning and perform as needed  - Assess and instruct to report SOB or any respiratory difficulty  - Respiratory Therapy support as indicated  - Manage/alleviate anxiety  - Monitor for signs/symptoms of CO2 retention  Outcome: Progressing     Problem: Patient/Family Goals  Goal: Patient/Family Long Term Goal  Description: Patient's Long Term Goal: discharge home    Interventions:  - See additional Care Plan goals for specific interventions  Outcome: Progressing  Goal: Patient/Family Short Term Goal  Description: Patient's Short Term Goal:   3/28: Maintain safety  3/29: maintain safety  03/29/2024 - Blood sugar control  3/30 AM - Wean O2  3/30 noc: decrease sob  4/1 am: GO FOR VIDEO SWALLOW AND PASS - EAT  4/2 am: have a bowel movement  4/2 NOC: sleep      Interventions:   - insulin as ordered  - supp O2, nebs, Flutter  - See additional Care Plan goals for specific interventions  Outcome: Progressing     Problem: CARDIOVASCULAR - ADULT  Goal: Maintains optimal cardiac output and hemodynamic stability  Description: INTERVENTIONS:  - Monitor vital signs, rhythm, and trends  - Monitor for bleeding, hypotension and signs of decreased cardiac output  - Evaluate effectiveness of vasoactive medications to optimize hemodynamic stability  - Monitor arterial and/or venous puncture sites for bleeding and/or hematoma  - Assess quality of pulses, skin color and temperature  - Assess for signs of decreased coronary artery perfusion - ex.  Angina  - Evaluate fluid balance, assess for edema, trend weights  Outcome: Progressing  Goal: Absence of cardiac arrhythmias or at baseline  Description: INTERVENTIONS:  - Continuous cardiac monitoring, monitor vital signs, obtain 12 lead EKG if indicated  - Evaluate effectiveness of antiarrhythmic and heart rate control medications as ordered  - Initiate emergency measures for life threatening arrhythmias  - Monitor electrolytes and administer replacement therapy as ordered  Outcome: Progressing     Problem: GASTROINTESTINAL - ADULT  Goal: Maintains or returns to baseline bowel function  Description: INTERVENTIONS:  - Assess bowel function  - Maintain adequate hydration with IV or PO as ordered and tolerated  - Evaluate effectiveness of GI medications  - Encourage mobilization and activity  - Obtain nutritional consult as needed  - Establish a toileting routine/schedule  - Consider collaborating with pharmacy to review patient's medication profile  Outcome: Progressing  Goal: Maintains adequate nutritional intake (undernourished)  Description: INTERVENTIONS:  - Monitor percentage of each meal consumed  - Identify factors contributing to decreased intake, treat as appropriate  - Assist with meals as needed  - Monitor I&O, WT and lab values  - Obtain nutritional consult as needed  - Optimize oral hygiene and moisture  - Encourage food from home; allow for food preferences  - Enhance eating environment  Outcome: Progressing

## 2024-04-03 NOTE — PLAN OF CARE
Pt is A&Ox4. Primarily Upper sorbian speaking. VSS, afebrile. SPO2 maintained on RA-2L. C/o SOB/ audible wheezing- MD aware, albuterol ordered. Nebs. CPT. Prednisone. Encouraged IS. Tele, a-fib. Eliquis. DW. Last BM 4/2. General/nectar thick/pureed diet. Vegetarian. Meds crushed in a/s. Incontinent x2, briefed, primofit. Up x2 and walker. Denies pain. PIV, IVF. No further needs at this time, continue POC. Safety precautions in place.     Problem: RESPIRATORY - ADULT  Goal: Achieves optimal ventilation and oxygenation  Description: INTERVENTIONS:  - Assess for changes in respiratory status  - Assess for changes in mentation and behavior  - Position to facilitate oxygenation and minimize respiratory effort  - Oxygen supplementation based on oxygen saturation or ABGs  - Provide Smoking Cessation handout, if applicable  - Encourage broncho-pulmonary hygiene including cough, deep breathe, Incentive Spirometry  - Assess the need for suctioning and perform as needed  - Assess and instruct to report SOB or any respiratory difficulty  - Respiratory Therapy support as indicated  - Manage/alleviate anxiety  - Monitor for signs/symptoms of CO2 retention  Outcome: Progressing     Problem: Patient/Family Goals  Goal: Patient/Family Long Term Goal  Description: Patient's Long Term Goal: discharge home    Interventions:  - See additional Care Plan goals for specific interventions  Outcome: Progressing  Goal: Patient/Family Short Term Goal  Description: Patient's Short Term Goal:   3/28: Maintain safety  3/29: maintain safety  03/29/2024 - Blood sugar control  3/30 AM - Wean O2  3/30 noc: decrease sob  4/1 am: GO FOR VIDEO SWALLOW AND PASS - EAT  4/2 am: have a bowel movement  4/2 NOC: sleep      Interventions:   - insulin as ordered  - supp O2, nebs, Flutter  - See additional Care Plan goals for specific interventions  Outcome: Progressing     Problem: CARDIOVASCULAR - ADULT  Goal: Maintains optimal cardiac output and hemodynamic  stability  Description: INTERVENTIONS:  - Monitor vital signs, rhythm, and trends  - Monitor for bleeding, hypotension and signs of decreased cardiac output  - Evaluate effectiveness of vasoactive medications to optimize hemodynamic stability  - Monitor arterial and/or venous puncture sites for bleeding and/or hematoma  - Assess quality of pulses, skin color and temperature  - Assess for signs of decreased coronary artery perfusion - ex. Angina  - Evaluate fluid balance, assess for edema, trend weights  Outcome: Progressing  Goal: Absence of cardiac arrhythmias or at baseline  Description: INTERVENTIONS:  - Continuous cardiac monitoring, monitor vital signs, obtain 12 lead EKG if indicated  - Evaluate effectiveness of antiarrhythmic and heart rate control medications as ordered  - Initiate emergency measures for life threatening arrhythmias  - Monitor electrolytes and administer replacement therapy as ordered  Outcome: Progressing     Problem: GASTROINTESTINAL - ADULT  Goal: Maintains or returns to baseline bowel function  Description: INTERVENTIONS:  - Assess bowel function  - Maintain adequate hydration with IV or PO as ordered and tolerated  - Evaluate effectiveness of GI medications  - Encourage mobilization and activity  - Obtain nutritional consult as needed  - Establish a toileting routine/schedule  - Consider collaborating with pharmacy to review patient's medication profile  Outcome: Progressing  Goal: Maintains adequate nutritional intake (undernourished)  Description: INTERVENTIONS:  - Monitor percentage of each meal consumed  - Identify factors contributing to decreased intake, treat as appropriate  - Assist with meals as needed  - Monitor I&O, WT and lab values  - Obtain nutritional consult as needed  - Optimize oral hygiene and moisture  - Encourage food from home; allow for food preferences  - Enhance eating environment  Outcome: Progressing

## 2024-04-03 NOTE — PROGRESS NOTES
Pulmonary Progress Note     Assessment / Plan:  Acute hypoxic resp failure - secondary to COPD exac triggered by RSV infection and now with secondary bacterial PNA  -wean supplemental O2 as able  -has significant mucous plugging and inability to clear secretions- cont CPT, Mucomyst, suctioning  AECOPD - secondary to RSV with secondary bacterial pneumonia. May be due to aspiration given patient's previous poor swallow eval.  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 Sputum culture with Klebsiella. MRSA swab negative  -zosyn (3/29- )   Abn CXR - there is a R lateral pleural based nodular density on the CXR that does not appear to have been present in the past  -outpatient follow up with Dr. Jones  Dysphagia - diet per speech  - family does not wish to pursue PEG  Proph - eliquis  Dispo - DNAR/select  -will follow      Subjective:  Ongoing productive cough  No other complaints    Objective:  Vitals:    04/02/24 2311 04/03/24 0413 04/03/24 0745 04/03/24 0900   BP: 147/51 140/65 141/63    BP Location: Right arm Right arm     Pulse:  69 61    Resp: 18 18 20    Temp: 97.5 °F (36.4 °C) 97.6 °F (36.4 °C) 98.2 °F (36.8 °C)    TempSrc: Oral Oral     SpO2:   100% 90%   Weight:       Height:         Physical Exam:  General: no apparent distress, conversant  Skin: no rash, ulcers or subcutaneous nodules  Eyes: anicteric sclerae, moist conjunctivae  Head, ears, nose, throat: atraumatic, oropharynx clear with moist mucous membranes  Neck: trachea midline with no thyromegaly  Heart: regular rate and rhythm, no murmurs / rubs / gallops  Lungs: clear bilaterally, normal respiratory effort, no accessory muscle use  Extremities: no edema or cyanosis  Psych: interactive, answering questions appropriately, appropriate affect    Medications:  Reviewed in EMR    Lab Data:  Reviewed in  EMR    Imaging:  I independently visualized all relevant chest imaging in PACS and agree with radiology interpretation except where noted.

## 2024-04-03 NOTE — CDS QUERY
DOCUMENTATION CLARIFICATION FORM    Dear Dr. Chatterjee:  Please provide a nutritional diagnosis, if known, related to the clinical information below:    [ X  ] Moderate Malnutrition   [   ]  Other (please specify) :_____________________________    Documentation from the Medical Record:     Clinical Indicators/Risk Factors:     ___BMI 21.46 kg/m², weight 62.1 kg, height 5'7”     ___Dietician Consult: Pt meets moderate malnutrition criteria at this time. Criteria for non-severe malnutrition diagnosis: chronic illness related to wt loss 10% in 6 months and muscle mass mild depletion Nutritional Physical Findings: Body Fat/Muscle Mass: mild muscle depletion Temple region and Clavicle  Region. NUTRITION DIAGNOSIS/PROBLEM: Malnutrition related to insufficient appetite resulting in inadequate nutrition intake as evidenced by documented/reported unintentional weight loss and loss of muscle mass.     Treatment: Dietician Consult….oral nutritional supplements: Vanilla Glucerna. Modified Diet with thickened liquids.                                Use of terms such as suspected, possible, or probable (associated with a specific diagnosis that is being evaluated, monitored, or treated as if it exists) are acceptable and can be coded in the inpatient setting, when documented at the time of discharge.     Please add any additional documentation to your progress note and continue to document this through discharge.    Thank you. For questions regarding this query, please contact Clinical : Armida Seay [phone]  This form is a permanent part of the medical record.

## 2024-04-03 NOTE — PROGRESS NOTES
[facility]  Hospitalist Progress Note                                                                     [facility]   part of [facility] Wendy  12/8/1932    SUBJECTIVE:    No acute events.  On 2L NC.  No fevers.              OBJECTIVE:  Temp:  [97.2 °F (36.2 °C)-98.3 °F (36.8 °C)] 98.3 °F (36.8 °C)  Pulse:  [61-92] 92  Resp:  [16-20] 16  BP: (140-154)/(46-65) 141/60  SpO2:  [90 %-100 %] 93 %  Exam  Gen: No acute distress, alert and oriented x3, no focal neurologic deficits  Pulm: intermittnt romchi , NO wheezing.    CV: Heart with regular rate and rhythm, no murmur.  Normal PMI.    Abd: Abdomen soft, nontender, nondistended, no organomegaly, bowel sounds present  MSK: Full range of motion in extremities, no clubbing, no cyanosis  Skin: + edema to thighs    Labs:   Recent Labs   Lab 03/29/24  0411 03/30/24  0616 03/31/24  0710 04/01/24  0644 04/02/24  0644 04/03/24  1014   WBC 7.3 16.3* 21.9* 19.1* 19.3* 20.7*   HGB 10.1* 9.0* 9.6* 9.9* 10.0* 10.5*   MCV 79.7* 80.4 81.3 82.1 80.0 80.9   .0 239.0 240.0 229.0 199.0 226.0   BAND 49 44  --   --   --   --        Recent Labs   Lab 03/31/24  1741 04/01/24  0644 04/01/24  1958 04/02/24  0644 04/03/24  1014   * 150* 146* 143 144   K 2.9* 3.7 3.5 3.9 3.6   * 121* 117* 114* 112   CO2 25.0 22.0 22.0 22.0 26.0   BUN 91* 83* 76* 72* 48*   CREATSERUM 2.51* 2.25* 2.09* 1.94* 1.60*   CA 9.6 9.5 9.1 8.7 8.6   * 235* 209* 189* 138*       Recent Labs   Lab 03/31/24  1741 04/01/24 1958   ALT 42 35   AST 21 15   ALB 2.2* 2.1*       Recent Labs   Lab 04/02/24  1112 04/02/24  1649 04/02/24  2035 04/03/24  0508 04/03/24  1117   PGLU 164* 309* 288* 205* 182*       Meds:   Scheduled:    apixaban  2.5 mg Oral BID    predniSONE  20 mg Oral Daily with breakfast    piperacillin-tazobactam  3.375 g Intravenous Q8H    guaiFENesin  400 mg Oral 6 times per day    insulin aspart  2-10  Units Subcutaneous TID AC and HS    multivitamin  1 tablet Oral Daily    insulin degludec  20 Units Subcutaneous Nightly    sodium chloride  4 mL Nebulization Q8H DIONI    ipratropium-albuterol  3 mL Nebulization Q8H DIONI    pantoprazole  40 mg Intravenous Q24H    metoprolol  5 mg Intravenous Q6H    dilTIAZem  10 mg Intravenous Once    acetylcysteine  2 mL Nebulization BID    atorvastatin  40 mg Oral Nightly    fluticasone furoate-vilanterol  1 puff Inhalation Daily    levothyroxine  50 mcg Oral Daily    metoprolol tartrate  12.5 mg Oral 2x Daily(Beta Blocker)    tamsulosin  0.4 mg Oral Nightly     Continuous Infusions:    dextrose 50 mL/hr at 04/02/24 2117     PRN: albuterol, lactulose, polyethylene glycol (PEG 3350), sodium chloride, glycerin-hypromellose-, sennosides, bisacodyl, glucose **OR** glucose **OR** glucose-vitamin C **OR** dextrose **OR** glucose **OR** glucose **OR** glucose-vitamin C, acetaminophen, alum-mag hydroxide-simethicone, acetaminophen, melatonin, ondansetron, metoclopramide, benzonatate, ipratropium-albuterol, benzocaine-menthol, acetaminophen, benzocaine-menthol    Assessment/Plan:  Principal Problem:    COPD exacerbation (HCC)  Active Problems:    Pleural effusion on right    Acute bronchiolitis due to respiratory syncytial virus (RSV)    89 year old male with PMH sig for COPD, paroxysmal atrial fibrillation, hypertension, CKD4, hyperlipidemia, diabetes mellitus type 2, history of CVA, hypothyroidism, 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  - stable     QUINTON on CKD stage 4- resolved  - BLE edema  - IVF, renal consult appreciated   - hold ARB, restart when ok w renal    Anemia, chronic  - likely from CKD, slight downtrend noted  - venofer      PAF  - convert back to oral meds, off heparin gtt    Dysphagia  -passed video: puree w nectar thcik liquids  - ensure     Essential HTN  - metoprolol, losartan on HOLD     Hyperlipidemia  - statin     Hx CVA  - asa, statin     Hypothyroidism   - sytnhroid     Constipation  - trial lactulose, bowel regimen      FEN: regular diet, PT/OT  Proph: SCDs, heparin gtt  Code status: Full code     Dispo - cont to monitor       [facility]  [facility]  Answering Service number: [phone]

## 2024-04-03 NOTE — PROGRESS NOTES
Nephrology Progress Note    Hector Nguyen Attending:  Michele Chatterjee MD       SUBJECTIVE:     Patient seen and examined at bedside.     PHYSICAL EXAM:     Vital Signs: /60 (BP Location: Right arm)   Pulse 92   Temp 98.3 °F (36.8 °C) (Oral)   Resp 16   Ht 5' 7\" (1.702 m)   Wt 137 lb (62.1 kg)   SpO2 93%   BMI 21.46 kg/m²   Temp (24hrs), Av.8 °F (36.6 °C), Min:97.2 °F (36.2 °C), Max:98.3 °F (36.8 °C)       Intake/Output Summary (Last 24 hours) at 4/3/2024 1311  Last data filed at 4/3/2024 0915  Gross per 24 hour   Intake 800 ml   Output 910 ml   Net -110 ml     Wt Readings from Last 3 Encounters:   24 137 lb (62.1 kg)   22 133 lb (60.3 kg)   22 133 lb 2.5 oz (60.4 kg)       General: pleasant, well appearing  HEENT: NCAT  Cardiac: RRR  Lungs: on supplemental oxygen  Abdomen: soft, non-tender  Extremities: trace LE edema  Neurologic/Psych: awake, alert     LABORATORY DATA:     Lab Results   Component Value Date     (H) 2024    BUN 48 (H) 2024    BUNCREA 18.0 02/10/2022    CREATSERUM 1.60 (H) 2024    ANIONGAP 6 2024    GFRNAA 39 (L) 2022    GFRAA 45 (L) 2022    CA 8.6 2024    OSMOCALC 313 (H) 2024    ALKPHO 109 2024    AST 15 2024    ALT 35 2024    BILT 0.9 2024    TP 6.7 2024    ALB 2.1 (L) 2024    GLOBULIN 4.6 (H) 2024     2024    K 3.6 2024     2024    CO2 26.0 2024     Lab Results   Component Value Date    WBC 20.7 (H) 2024    RBC 4.14 2024    HGB 10.5 (L) 2024    HCT 33.5 (L) 2024    .0 2024    MCV 80.9 2024    MCH 25.4 (L) 2024    MCHC 31.3 2024    RDW 15.4 2024    NEPRELIM 14.87 (H) 2024    NEUTABS 15.00 (H) 2024    LYMPHABS 0.65 (L) 2024    EOSABS 0.33 2024    BASABS 0.00 2024    NEUT 48 2024    LYMPH 4 2024    MON 2 2024     EOS 2 03/30/2024    BASO 0 03/30/2024    NEPERCENT 87.9 03/28/2024    LYPERCENT 1.9 03/28/2024    MOPERCENT 9.7 03/28/2024    EOPERCENT 0.0 03/28/2024    BAPERCENT 0.1 03/28/2024    NE 12.81 (H) 03/28/2024    LYMABS 0.27 (L) 03/28/2024    MOABSO 1.41 (H) 03/28/2024    EOABSO 0.00 03/28/2024    BAABSO 0.02 03/28/2024     Lab Results   Component Value Date    MALBP 13.3 01/26/2022    CREUR 81.40 03/30/2024     Lab Results   Component Value Date    COLORUR Yellow 02/22/2022    CLARITY Clear 02/22/2022    SPECGRAVITY 1.015 02/22/2022    GLUUR >=500 (A) 02/22/2022    BILUR Negative 02/22/2022    KETUR 20 (A) 02/22/2022    BLOODURINE Moderate (A) 02/22/2022    PHURINE 7.0 02/22/2022    PROUR 30 (A) 02/22/2022    UROBILINOGEN <2.0 02/22/2022    NITRITE Negative 02/22/2022    LEUUR Negative 02/22/2022    WBCUR 1-5 02/22/2022    RBCUR 0-2 02/22/2022    EPIUR None Seen 02/22/2022    BACUR None Seen 02/22/2022     IMAGING:     Reviewed    ASSESSMENT/PLAN:     QUINTON on CKD (baseline 1.5-1.7) - prerenal due to volume depletion - improving   Hypernatremia  Anemia -iron deficient  Hypertension/diastolic CHF -PTA regimen: Lasix 40 twice daily, metoprolol 12.5 mg twice daily, losartan 100 mg daily  COPD exacerbation with RSV bronchitis        Stop D5W  Encourage PO intake  Will add diuretics today  Hold losartan at this time, can introduce if needed for BP control  Monitor hyperglycemia  Avoid nephrotoxins and renally dose medications for creatinine clearance        Brigette Troy, DO  [facility]

## 2024-04-03 NOTE — PAYOR COMM NOTE
--------------  CONTINUED STAY REVIEW  4/2  Payor: [employer] POS/MC[employer]  Subscriber #:  [id number]  Authorization Number: [id number]    Admit date: 3/29/24  Admit time: 11:15 AM    Admitting Physician: Everardo Woo MD  Attending Physician:  Michele Chatterjee MD  Primary Care Physician: Sp Myers MD    REVIEW DOCUMENTATION:    4/2  dozing off at times. Family at bedside. Passed video swallow!. Poor appetite.     Exam  Gen: No acute distress, alert and oriented x3, no focal neurologic deficits  Pulm: intermittnt romchi , NO wheezing.    CV: Heart with regular rate and rhythm, no murmur.  Normal PMI.    Abd: Abdomen soft, nontender, nondistended, no organomegaly, bowel sounds present  MSK: Full range of motion in extremities, no clubbing, no cyanosis  Skin: + edema to thighs     Labs:           Recent Labs   Lab 03/29/24  0411 03/30/24  0616 03/31/24  0710 04/01/24  0644 04/02/24  0644   WBC 7.3 16.3* 21.9* 19.1* 19.3*   HGB 10.1* 9.0* 9.6* 9.9* 10.0*   MCV 79.7* 80.4 81.3 82.1 80.0   .0 239.0 240.0 229.0 199.0   BAND 49 44  --   --   --                    Recent Labs   Lab 03/27/24  0656 03/28/24  0701 03/31/24  0710 03/31/24  1741 04/01/24  0644 04/01/24  1958 04/02/24  0644   *   < > 150* 149* 150* 146* 143   K 4.4   < > 3.4* 2.9* 3.7 3.5 3.9      < > 119* 115* 121* 117* 114*   CO2 26.0   < > 26.0 25.0 22.0 22.0 22.0   BUN 42*   < > 96* 91* 83* 76* 72*   CREATSERUM 1.95*   < > 2.71* 2.51* 2.25* 2.09* 1.94*   CA 9.7   < > 9.3 9.6 9.5 9.1 8.7   MG 2.2  --   --   --   --   --   --    *   < > 229* 324* 235* 209* 189*    < > = values in this interval not displayed.               Recent Labs   Lab 03/27/24  0656 03/31/24  1741 04/01/24 1958   ALT 14* 42 35   AST 14* 21 15   ALB 2.8* 2.2* 2.1*                 Recent Labs   Lab 04/01/24  1234 04/01/24  1643 04/01/24 2016 04/02/24  0502 04/02/24  1112   PGLU 230* 296* 231* 191* 164*     Meds:   Scheduled:    apixaban  2.5 mg Oral BID     [START ON 4/3/2024] predniSONE  20 mg Oral Daily with breakfast    piperacillin-tazobactam  3.375 g Intravenous Q8H    insulin degludec  20 Units Subcutaneous Nightly    insulin aspart  4-20 Units Subcutaneous TID AC and HS    sodium chloride  4 mL Nebulization Q8H DIONI    ipratropium-albuterol  3 mL Nebulization Q8H DIONI    pantoprazole  40 mg Intravenous Q24H    metoprolol  5 mg Intravenous Q6H    dilTIAZem  10 mg Intravenous Once    acetylcysteine  2 mL Nebulization BID    insulin aspart  10 Units Subcutaneous Once    [Held by provider] predniSONE  40 mg Oral Daily with breakfast    atorvastatin  40 mg Oral Nightly    fluticasone furoate-vilanterol  1 puff Inhalation Daily    levothyroxine  50 mcg Oral Daily    metoprolol tartrate  12.5 mg Oral 2x Daily(Beta Blocker)    guaiFENesin ER  600 mg Oral BID    tamsulosin  0.4 mg Oral Nightly      Continuous Infusions:    dextrose 100 mL/hr at 04/02/24 0822      PRN: lactulose, polyethylene glycol (PEG 3350), sodium chloride, glycerin-hypromellose-, sennosides, bisacodyl, glucose **OR** glucose **OR** glucose-vitamin C **OR** dextrose **OR** glucose **OR** glucose **OR** glucose-vitamin C, acetaminophen, alum-mag hydroxide-simethicone, acetaminophen, melatonin, ondansetron, metoclopramide, benzonatate, ipratropium-albuterol, benzocaine-menthol, acetaminophen, benzocaine-menthol     Assessment/Plan:  Principal Problem:    COPD exacerbation (HCC)  Active Problems:    Pleural effusion on right    Acute bronchiolitis due to respiratory syncytial virus (RSV)     89 year old male with PMH sig for COPD, paroxysmal atrial fibrillation, hypertension, CKD4, hyperlipidemia, diabetes mellitus type 2, history of CVA, hypothyroidism, 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     Acute hypoxic respiratory failure   Acute COPD exacerbation 2/2 RSV Bronchitis  Increase R lung infiltrate, likely secondary bacterial PNA,  possible aspiration pna  - convert back to po steroids starting tomorrow  (dose given this am)  - 02 increased to 10 L 3/29, now stable on 1 L NC this am  - started zosyn 3/29- to complete 7 d course  - scheduled nebuilzers, add hypertonic saline   - breo, BD protocol  - supportive care  - pulm following      Sepsis from above- resolved  - treat PNA, zosyn  - plan as above      DM2 w steroid induced hyperglycemia  - cont  degludec from 12 u-> 15 u nightly-> 20 u nightly   - increased to high dose ssi     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  - stable     QUINTON on CKD stage 4- resolved  - BLE edema  - IVF, renal consult appreciated   - hold ARB, restart when ok w renal     Anemia, chronic  - likely from CKD, slight downtrend noted  - venofer      PAF  - convert back to oral meds, off heparin gtt     Dysphagia  -passed video: puree w nectar thcik liquids     Essential HTN  - metoprolol, losartan on HOLD     Hyperlipidemia  - statin     Hx CVA  - asa, statin     Hypothyroidism   - sytnhroid      Constipation  - trial lactulose, bowel regimen      FEN: regular diet, PT/OT  Proph: SCDs, heparin gtt  Code status: Full code           4/2 Pulmonary  Acute hypoxic resp failure -- secondary to COPD exac triggered by RSV infection and now with secondary bacterial PNA.  -on 3L nc  - Has significant mucous plugging and inability to clear secretions- cont CPT, suctioning prn,   -pulm toilet  AECOPD- secondary to RSV with secondary bacterial pneumonia.  May be due to aspiration given patient's previous poor swallow eval.  -IV steroid burst- convert to PO  -Breo  -BD protocol  Pleural Effusion -- chronic R effusion- unchanged  -tapped in past with trapped lung  -would not rec thora at this time  Pneumonia- likely aspiration- as above  - MRSA swab negative  -zosyn (3/29-4/4)   - sputum cx in process  -cont saline and duonebs Q8, mucomyst Q12  Abn CXR -- there is a R lateral pleural based nodular density on the CXR that does  not appear to have been present in the past  -will need out patient follow up with Dr. Jones.  Dysphagia- diet per speech  -family does not wish to pursue PEG  Proph-eliquis  Dispo -- DNAR/select. No intubation          4/2 Nephrology  QUINTON on CKD (baseline 1.5-1.7) - prerenal due to volume depletion - improving   Hypernatremia  Anemia -iron deficient  Hypertension/diastolic CHF -PTA regimen: Lasix 40 twice daily, metoprolol 12.5 mg twice daily, losartan 100 mg daily  COPD exacerbation with RSV bronchitis        Continue D5W at low rate  Encourage PO intake  Will consider diuretics if worsening volume overload  Hold home furosemide and losartan for now  Monitor hyperglycemia  Avoid nephrotoxins and renally dose medications for creatinine clearance           MEDICATIONS ADMINISTERED IN LAST 1 DAY:  acetylcysteine (Mucomyst) 20 % nebulizer solution 2 mL       Date Action Dose Route User    4/2/2024 1708 Given 2 mL Nebulization Renaldo Rose RCP    4/2/2024 0919 Given 2 mL Nebulization Renaldo Rose RCP          apixaban (Eliquis) tab 2.5 mg       Date Action Dose Route User    4/2/2024 2120 Given 2.5 mg Oral Alice Batista RN    4/2/2024 1156 Given 2.5 mg Oral Marilee Berrios RN          atorvastatin (Lipitor) tab 40 mg       Date Action Dose Route User    4/2/2024 2120 Given 40 mg Oral Ailce Batista RN          heparin (Porcine) 84342 units/250mL infusion ACS/AFIB CONTINUOUS       Date Action Dose Route User    4/2/2024 0846 New Bag 700 Units/hr Intravenous Marilee Berrios RN          dextrose 5% infusion       Date Action Dose Route User    4/2/2024 2117 New Bag (none) Intravenous Alice Batista RN    4/2/2024 1134 Rate/Dose Change (none) Intravenous Marilee Berrios RN    4/2/2024 0822 New Bag (none) Intravenous Marilee Berrios RN          fluticasone furoate-vilanterol (Breo Ellipta) 200-25 MCG/ACT inhaler 1 puff       Date Action Dose Route User    4/2/2024 0821  Given 1 puff Inhalation Marilee Berrios RN          guaiFENesin (Mucinex) tab 400 mg       Date Action Dose Route User    4/3/2024 0524 Given 400 mg Oral Alice Batista RN    4/3/2024 0205 Given 400 mg Oral Alice Batista RN    4/2/2024 2120 Given 400 mg Oral Alice Batista RN    4/2/2024 1705 Given 400 mg Oral Marilee Berrios RN    4/2/2024 1218 Given 400 mg Oral Marilee Berrios RN          insulin aspart (NovoLOG) 100 Units/mL FlexPen 2-10 Units       Date Action Dose Route User    4/3/2024 0529 Given 3 Units Subcutaneous (Left Lower Abdomen) Alice Batista RN    4/2/2024 2111 Given 8 Units Subcutaneous (Left Upper Arm) Alice Batista RN    4/2/2024 1702 Given 10 Units Subcutaneous (Left Upper Arm) Marilee Berrios RN    4/2/2024 1305 Given 2 Units Subcutaneous (Right Upper Arm) Marilee Berriso RN          insulin degludec 100 units/mL flextouch 20 Units       Date Action Dose Route User    4/2/2024 2111 Given 20 Units Subcutaneous (Left Upper Arm) Alice Batista RN          ipratropium-albuterol (Duoneb) 0.5-2.5 (3) MG/3ML inhalation solution 3 mL       Date Action Dose Route User    4/3/2024 0129 Given 3 mL Nebulization Jamla Kaufman RCP    4/2/2024 1707 Given 3 mL Nebulization Renaldo Rose RCP    4/2/2024 0918 Given 3 mL Nebulization Renaldo Rose, JUSTINA          lactulose (CHRONULAC) 10 GM/15ML solution 20 g       Date Action Dose Route User    4/2/2024 1156 Given 20 g Oral Marilee Berrios RN          levothyroxine (Synthroid) tab 50 mcg       Date Action Dose Route User    4/2/2024 0822 Given 50 mcg Oral Marilee Berrios RN          melatonin tab 3 mg       Date Action Dose Route User    4/3/2024 0205 Given 3 mg Oral Alice Batista RN          methylPREDNISolone sodium succinate (Solu-MEDROL) injection 20 mg       Date Action Dose Route User    4/2/2024 0822 Given 20 mg Intravenous Marilee Berrios RN           metoprolol tartrate (Lopressor) partial tab 12.5 mg       Date Action Dose Route User    4/3/2024 0524 Given 12.5 mg Oral Alice Batista RN    4/2/2024 1708 Given 12.5 mg Oral Marilee Berrios RN          pantoprazole (Protonix) injection 40 mg       Date Action Dose Route User    4/3/2024 0521 Given 40 mg Intravenous Alice Batista RN          piperacillin-tazobactam (Zosyn) 3.375 g in dextrose 5% 100 mL IVPB-ADDV       Date Action Dose Route User    4/3/2024 0520 New Bag 3.375 g Intravenous Alice Batista RN    4/2/2024 2117 New Bag 3.375 g Intravenous Alice Batista RN    4/2/2024 1156 New Bag 3.375 g Intravenous Marilee Berrios RN          sodium chloride (Saline Mist) 0.65 % nasal solution 1 spray       Date Action Dose Route User    4/2/2024 0821 Given 1 spray Each Nare Marilee Berrios RN          sodium chloride 7 % nebulizer solution 4 mL       Date Action Dose Route User    4/3/2024 0129 Given 4 mL Nebulization Jamal Kaufman S, JUSTINA    4/2/2024 1710 Given 4 mL Nebulization Renaldo Rose RCP    4/2/2024 0920 Given 4 mL Nebulization Renaldo Rose, [facility]          tamsulosin (Flomax) cap 0.4 mg       Date Action Dose Route User    4/2/2024 2120 Given 0.4 mg Oral Alice Batista RN            Vitals (last day)       Date/Time Temp Pulse Resp BP SpO2 Weight O2 Device O2 Flow Rate (L/min) [facility]    04/03/24 0532 -- -- -- -- -- -- None (Room air) --     04/03/24 0413 -- -- -- -- -- -- Nasal cannula 2 L/min     04/03/24 0413 97.6 °F (36.4 °C) 69 18 140/65 -- -- -- --     04/02/24 2311 97.5 °F (36.4 °C) -- 18 147/51 -- -- None (Room air) --     04/02/24 1944 97.2 °F (36.2 °C) -- -- 154/54 -- -- None (Room air) -- AC    04/02/24 1708 -- 73 -- 146/63 95 % -- None (Room air) 0 L/min KD    04/02/24 1530 97.7 °F (36.5 °C) 69 -- 148/46 97 % -- High flow nasal cannula 2 L/min TJ    04/02/24 1115 97.7 °F (36.5 °C) 74 -- 138/47 95 % -- High flow nasal cannula 2 L/min TJ     04/02/24 0945 -- 67 -- 147/41 96 % -- -- -- KD    04/02/24 0920 -- -- -- -- -- -- High flow nasal cannula 1 L/min EM    04/02/24 0745 -- 63 -- 120/57 97 % -- High flow nasal cannula 1 L/min TJ    04/02/24 0730 -- 70 -- -- 98 % -- High flow nasal cannula 1 L/min TJ    04/02/24 0506 97.9 °F (36.6 °C) -- 18 -- -- -- Nasal cannula 2 L/min AC          CIWA Scores (since admission)       None              Procedures:      Plan:

## 2024-04-04 LAB
ANION GAP SERPL CALC-SCNC: 7 MMOL/L (ref 0–18)
BUN BLD-MCNC: 41 MG/DL (ref 9–23)
CALCIUM BLD-MCNC: 8.6 MG/DL (ref 8.5–10.1)
CHLORIDE SERPL-SCNC: 112 MMOL/L (ref 98–112)
CO2 SERPL-SCNC: 25 MMOL/L (ref 21–32)
CREAT BLD-MCNC: 1.59 MG/DL
EGFRCR SERPLBLD CKD-EPI 2021: 41 ML/MIN/1.73M2 (ref 60–?)
ERYTHROCYTE [DISTWIDTH] IN BLOOD BY AUTOMATED COUNT: 15 %
GLUCOSE BLD-MCNC: 126 MG/DL (ref 70–99)
GLUCOSE BLD-MCNC: 126 MG/DL (ref 70–99)
GLUCOSE BLD-MCNC: 157 MG/DL (ref 70–99)
GLUCOSE BLD-MCNC: 337 MG/DL (ref 70–99)
GLUCOSE BLD-MCNC: 339 MG/DL (ref 70–99)
HCT VFR BLD AUTO: 30.5 %
HGB BLD-MCNC: 10.1 G/DL
MAGNESIUM SERPL-MCNC: 2.3 MG/DL (ref 1.6–2.6)
MCH RBC QN AUTO: 25.7 PG (ref 26–34)
MCHC RBC AUTO-ENTMCNC: 33.1 G/DL (ref 31–37)
MCV RBC AUTO: 77.6 FL
OSMOLALITY SERPL CALC.SUM OF ELEC: 310 MOSM/KG (ref 275–295)
PLATELET # BLD AUTO: 238 10(3)UL (ref 150–450)
POTASSIUM SERPL-SCNC: 3.3 MMOL/L (ref 3.5–5.1)
RBC # BLD AUTO: 3.93 X10(6)UL
SODIUM SERPL-SCNC: 144 MMOL/L (ref 136–145)
WBC # BLD AUTO: 19.7 X10(3) UL (ref 4–11)

## 2024-04-04 PROCEDURE — 83735 ASSAY OF MAGNESIUM: CPT | Performed by: STUDENT IN AN ORGANIZED HEALTH CARE EDUCATION/TRAINING PROGRAM

## 2024-04-04 PROCEDURE — 94640 AIRWAY INHALATION TREATMENT: CPT

## 2024-04-04 PROCEDURE — 80048 BASIC METABOLIC PNL TOTAL CA: CPT | Performed by: INTERNAL MEDICINE

## 2024-04-04 PROCEDURE — 85027 COMPLETE CBC AUTOMATED: CPT | Performed by: INTERNAL MEDICINE

## 2024-04-04 PROCEDURE — 94668 MNPJ CHEST WALL SBSQ: CPT

## 2024-04-04 PROCEDURE — 82962 GLUCOSE BLOOD TEST: CPT

## 2024-04-04 PROCEDURE — 92526 ORAL FUNCTION THERAPY: CPT

## 2024-04-04 PROCEDURE — 94667 MNPJ CHEST WALL 1ST: CPT

## 2024-04-04 PROCEDURE — C9113 INJ PANTOPRAZOLE SODIUM, VIA: HCPCS | Performed by: HOSPITALIST

## 2024-04-04 RX ORDER — FUROSEMIDE 40 MG/1
40 TABLET ORAL
Status: DISCONTINUED | OUTPATIENT
Start: 2024-04-04 | End: 2024-04-06

## 2024-04-04 RX ORDER — INSULIN DEGLUDEC 100 U/ML
12 INJECTION, SOLUTION SUBCUTANEOUS NIGHTLY
Status: DISCONTINUED | OUTPATIENT
Start: 2024-04-04 | End: 2024-04-06

## 2024-04-04 RX ORDER — PREDNISONE 10 MG/1
10 TABLET ORAL
Status: DISCONTINUED | OUTPATIENT
Start: 2024-04-05 | End: 2024-04-06

## 2024-04-04 RX ORDER — POTASSIUM CHLORIDE 20 MEQ/1
40 TABLET, EXTENDED RELEASE ORAL ONCE
Status: COMPLETED | OUTPATIENT
Start: 2024-04-04 | End: 2024-04-04

## 2024-04-04 NOTE — PROGRESS NOTES
[facility]  Hospitalist Progress Note                                                                     [facility]   part of [facility] GABE Nguyen  12/8/1932    SUBJECTIVE:    No acute events.  On RA this am.  Family at bedside.  Report desat to 88% with exertion.  Some LE edema.              OBJECTIVE:  Temp:  [97.8 °F (36.6 °C)-98.8 °F (37.1 °C)] 98.8 °F (37.1 °C)  Pulse:  [65-87] 73  Resp:  [16-22] 18  BP: (117-144)/(36-53) 117/41  SpO2:  [92 %-100 %] 92 %  Exam  Gen: No acute distress, alert and oriented x3, no focal neurologic deficits  Pulm: intermittnt romchi , NO wheezing.    CV: Heart with regular rate and rhythm, no murmur.  Normal PMI.    Abd: Abdomen soft, nontender, nondistended, no organomegaly, bowel sounds present  MSK: Full range of motion in extremities, no clubbing, no cyanosis  Skin: + edema to thighs    Labs:   Recent Labs   Lab 03/29/24  0411 03/30/24  0616 03/31/24  0710 04/01/24  0644 04/02/24  0644 04/03/24  1014 04/04/24  0556   WBC 7.3 16.3* 21.9* 19.1* 19.3* 20.7* 19.7*   HGB 10.1* 9.0* 9.6* 9.9* 10.0* 10.5* 10.1*   MCV 79.7* 80.4 81.3 82.1 80.0 80.9 77.6*   .0 239.0 240.0 229.0 199.0 226.0 238.0   BAND 49 44  --   --   --   --   --        Recent Labs   Lab 04/01/24  0644 04/01/24  1958 04/02/24  0644 04/03/24  1014 04/04/24  0556   * 146* 143 144 144   K 3.7 3.5 3.9 3.6 3.3*   * 117* 114* 112 112   CO2 22.0 22.0 22.0 26.0 25.0   BUN 83* 76* 72* 48* 41*   CREATSERUM 2.25* 2.09* 1.94* 1.60* 1.59*   CA 9.5 9.1 8.7 8.6 8.6   MG  --   --   --   --  2.3   * 209* 189* 138* 126*       Recent Labs   Lab 03/31/24  1741 04/01/24 1958   ALT 42 35   AST 21 15   ALB 2.2* 2.1*       Recent Labs   Lab 04/03/24  1117 04/03/24  1617 04/03/24  2045 04/04/24  0509 04/04/24  1202   PGLU 182* 327* 271* 126* 157*       Meds:   Scheduled:    insulin degludec  12 Units Subcutaneous Nightly    [START  ON 4/5/2024] predniSONE  10 mg Oral Daily with breakfast    furosemide  40 mg Oral BID (Diuretic)    apixaban  2.5 mg Oral BID    piperacillin-tazobactam  3.375 g Intravenous Q8H    guaiFENesin  400 mg Oral 6 times per day    insulin aspart  2-10 Units Subcutaneous TID AC and HS    multivitamin  1 tablet Oral Daily    sodium chloride  4 mL Nebulization Q8H DIONI    ipratropium-albuterol  3 mL Nebulization Q8H DIONI    pantoprazole  40 mg Intravenous Q24H    dilTIAZem  10 mg Intravenous Once    acetylcysteine  2 mL Nebulization BID    atorvastatin  40 mg Oral Nightly    fluticasone furoate-vilanterol  1 puff Inhalation Daily    levothyroxine  50 mcg Oral Daily    metoprolol tartrate  12.5 mg Oral 2x Daily(Beta Blocker)    tamsulosin  0.4 mg Oral Nightly     Continuous Infusions:       PRN: albuterol, lactulose, polyethylene glycol (PEG 3350), sodium chloride, glycerin-hypromellose-, sennosides, bisacodyl, glucose **OR** glucose **OR** glucose-vitamin C **OR** dextrose **OR** glucose **OR** glucose **OR** glucose-vitamin C, acetaminophen, alum-mag hydroxide-simethicone, acetaminophen, melatonin, ondansetron, metoclopramide, benzonatate, ipratropium-albuterol, benzocaine-menthol, acetaminophen, benzocaine-menthol    Assessment/Plan:  Principal Problem:    COPD exacerbation (HCC)  Active Problems:    Pleural effusion on right    Acute bronchiolitis due to respiratory syncytial virus (RSV)    89 year old male with PMH sig for COPD, paroxysmal atrial fibrillation, hypertension, CKD4, hyperlipidemia, diabetes mellitus type 2, history of CVA, hypothyroidism, 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     Acute hypoxic respiratory failure   Acute COPD exacerbation 2/2 RSV Bronchitis  Increase R lung infiltrate, likely secondary bacterial PNA, possible aspiration pna  - PO steroids  - on 2L NC, will need home 02 eval prior to dc  - started zosyn 3/29- to complete 7  d course  - scheduled nebuilzers, add hypertonic saline   - breo, BD protocol  - supportive care  - pulm following     Sepsis from above- resolved  - treat PNA, zosyn  - plan as above      DM2 w steroid induced hyperglycemia  - BG reviewed:  with little po and reducing steroid dosing will decrease degludec to 12 u   - may need insulin on dc while on steroids  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  - stable     QUINTON on CKD stage 4- resolved  - BLE edema  - IVF, renal consult appreciated   - hold ARB, restart when ok w renal    Anemia, chronic  - likely from CKD, slight downtrend noted  - venofer      PAF  - cont metoprool and eliquis     Dysphagia  -passed video: puree w nectar thcik liquids  - ensure     Essential HTN  - metoprolol, losartan now resumed    Hyperlipidemia  - statin     Hx CVA  - asa, statin     Hypothyroidism   - sytnhroid     Constipation  - trial lactulose, bowel regimen      FEN: regular diet, PT/OT  Proph: SCDs, heparin gtt  Code status: Full code     Dispo - cont to monitor, possible dc home tomorrow      [facility]  Internal Medicine  Answering Service number: [phone]

## 2024-04-04 NOTE — PROGRESS NOTES
Nephrology Progress Note    Hector Nguyen Attending:  Michele Chatterjee MD       SUBJECTIVE:     Patient seen and examined at bedside.     PHYSICAL EXAM:     Vital Signs: /41 (BP Location: Left arm)   Pulse 74   Temp 98.8 °F (37.1 °C) (Oral)   Resp 18   Ht 5' 7\" (1.702 m)   Wt 137 lb (62.1 kg)   SpO2 96%   BMI 21.46 kg/m²   Temp (24hrs), Av.1 °F (36.7 °C), Min:97.8 °F (36.6 °C), Max:98.8 °F (37.1 °C)       Intake/Output Summary (Last 24 hours) at 2024 1409  Last data filed at 2024 1207  Gross per 24 hour   Intake --   Output 2500 ml   Net -2500 ml     Wt Readings from Last 3 Encounters:   24 137 lb (62.1 kg)   22 133 lb (60.3 kg)   22 133 lb 2.5 oz (60.4 kg)       General: pleasant, well appearing  HEENT: NCAT  Cardiac: RRR  Lungs: no distress  Abdomen: soft, non-tender  Extremities: trace LE edema  Neurologic/Psych: awake, alert     LABORATORY DATA:     Lab Results   Component Value Date     (H) 2024    BUN 41 (H) 2024    BUNCREA 18.0 02/10/2022    CREATSERUM 1.59 (H) 2024    ANIONGAP 7 2024    GFRNAA 39 (L) 2022    GFRAA 45 (L) 2022    CA 8.6 2024    OSMOCALC 310 (H) 2024    ALKPHO 109 2024    AST 15 2024    ALT 35 2024    BILT 0.9 2024    TP 6.7 2024    ALB 2.1 (L) 2024    GLOBULIN 4.6 (H) 2024     2024    K 3.3 (L) 2024     2024    CO2 25.0 2024     Lab Results   Component Value Date    WBC 19.7 (H) 2024    RBC 3.93 2024    HGB 10.1 (L) 2024    HCT 30.5 (L) 2024    .0 2024    MCV 77.6 (L) 2024    MCH 25.7 (L) 2024    MCHC 33.1 2024    RDW 15.0 2024    NEPRELIM 14.87 (H) 2024    NEUTABS 15.00 (H) 2024    LYMPHABS 0.65 (L) 2024    EOSABS 0.33 2024    BASABS 0.00 2024    NEUT 48 2024    LYMPH 4 2024    MON 2 2024    EOS 2  03/30/2024    BASO 0 03/30/2024    NEPERCENT 87.9 03/28/2024    LYPERCENT 1.9 03/28/2024    MOPERCENT 9.7 03/28/2024    EOPERCENT 0.0 03/28/2024    BAPERCENT 0.1 03/28/2024    NE 12.81 (H) 03/28/2024    LYMABS 0.27 (L) 03/28/2024    MOABSO 1.41 (H) 03/28/2024    EOABSO 0.00 03/28/2024    BAABSO 0.02 03/28/2024     Lab Results   Component Value Date    MALBP 13.3 01/26/2022    CREUR 81.40 03/30/2024     Lab Results   Component Value Date    COLORUR Yellow 02/22/2022    CLARITY Clear 02/22/2022    SPECGRAVITY 1.015 02/22/2022    GLUUR >=500 (A) 02/22/2022    BILUR Negative 02/22/2022    KETUR 20 (A) 02/22/2022    BLOODURINE Moderate (A) 02/22/2022    PHURINE 7.0 02/22/2022    PROUR 30 (A) 02/22/2022    UROBILINOGEN <2.0 02/22/2022    NITRITE Negative 02/22/2022    LEUUR Negative 02/22/2022    WBCUR 1-5 02/22/2022    RBCUR 0-2 02/22/2022    EPIUR None Seen 02/22/2022    BACUR None Seen 02/22/2022     IMAGING:     Reviewed    ASSESSMENT/PLAN:     QUINTON on CKD (baseline 1.5-1.7) - prerenal due to volume depletion - improved  Hypernatremia - improved  Anemia -iron deficient  Hypertension/diastolic CHF -PTA regimen: Lasix 40 twice daily, metoprolol 12.5 mg twice daily, losartan 100 mg daily  COPD exacerbation with RSV bronchitis          Encourage PO intake  Will add diuretics today - lasix 40mg BID (home dose)  Okay to resume losartan for better BP control  Monitor hyperglycemia  S/p IV iron, no indication for EPO currently  Avoid nephrotoxins and renally dose medications for creatinine clearance        DO Jovani Hernadez [facility] - Nephrology

## 2024-04-04 NOTE — PAYOR COMM NOTE
--------------  CONTINUED STAY REVIEW  4/3  Payor: [employer]  Subscriber #:  [id number]  Authorization Number: [id number]    Admit date: 3/29/24  Admit time: 11:15 AM    Admitting Physician: Everardo Woo MD  Attending Physician:  Michele Chatterjee MD  Primary Care Physician: Sp Myers MD    REVIEW DOCUMENTATION:    4/3 Pulmonary  Assessment / Plan:  Acute hypoxic resp failure - secondary to COPD exac triggered by RSV infection and now with secondary bacterial PNA  -wean supplemental O2 as able  -has significant mucous plugging and inability to clear secretions- cont CPT, Mucomyst, suctioning  AECOPD - secondary to RSV with secondary bacterial pneumonia. May be due to aspiration given patient's previous poor swallow eval.  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 Sputum culture with Klebsiella. MRSA swab negative  -zosyn (3/29- )   Abn CXR - there is a R lateral pleural based nodular density on the CXR that does not appear to have been present in the past  -outpatient follow up with Dr. Jones  Dysphagia - diet per speech  - family does not wish to pursue PEG  Proph - eliquis  Dispo - DNAR/select  -will follow            4/3 IM    On 2L NC.     Skin: + edema to thighs     Lab 03/29/24  0411 03/30/24  0616 03/31/24  0710 04/01/24  0644 04/02/24  0644 04/03/24  1014   WBC 7.3 16.3* 21.9* 19.1* 19.3* 20.7*   HGB 10.1* 9.0* 9.6* 9.9* 10.0* 10.5*   MCV 79.7* 80.4 81.3 82.1 80.0 80.9   .0 239.0 240.0 229.0 199.0 226.0   BAND 49 44  --   --   --   --                  Recent Labs   Lab 03/31/24  1741 04/01/24  0644 04/01/24  1958 04/02/24  0644 04/03/24  1014   * 150* 146* 143 144   K 2.9* 3.7 3.5 3.9 3.6   * 121* 117* 114* 112   CO2 25.0 22.0 22.0 22.0 26.0   BUN 91* 83* 76* 72* 48*   CREATSERUM 2.51* 2.25* 2.09* 1.94* 1.60*   CA 9.6  9.5 9.1 8.7 8.6   * 235* 209* 189* 138*     Meds:   Scheduled:    apixaban  2.5 mg Oral BID    predniSONE  20 mg Oral Daily with breakfast    piperacillin-tazobactam  3.375 g Intravenous Q8H    guaiFENesin  400 mg Oral 6 times per day    insulin aspart  2-10 Units Subcutaneous TID AC and HS    multivitamin  1 tablet Oral Daily    insulin degludec  20 Units Subcutaneous Nightly    sodium chloride  4 mL Nebulization Q8H DIONI    ipratropium-albuterol  3 mL Nebulization Q8H DIONI    pantoprazole  40 mg Intravenous Q24H    metoprolol  5 mg Intravenous Q6H    dilTIAZem  10 mg Intravenous Once    acetylcysteine  2 mL Nebulization BID    atorvastatin  40 mg Oral Nightly    fluticasone furoate-vilanterol  1 puff Inhalation Daily    levothyroxine  50 mcg Oral Daily    metoprolol tartrate  12.5 mg Oral 2x Daily(Beta Blocker)    tamsulosin  0.4 mg Oral Nightly      Continuous Infusions:    dextrose 50 mL/hr at 04/02/24 2117      PRN: albuterol, lactulose, polyethylene glycol (PEG 3350), sodium chloride, glycerin-hypromellose-, sennosides, bisacodyl, glucose **OR** glucose **OR** glucose-vitamin C **OR** dextrose **OR** glucose **OR** glucose **OR** glucose-vitamin C, acetaminophen, alum-mag hydroxide-simethicone, acetaminophen, melatonin, ondansetron, metoclopramide, benzonatate, ipratropium-albuterol, benzocaine-menthol, acetaminophen, benzocaine-menthol     Assessment/Plan:  Principal Problem:    COPD exacerbation (HCC)  Active Problems:    Pleural effusion on right    Acute bronchiolitis due to respiratory syncytial virus (RSV)     89 year old male with PMH sig for COPD, paroxysmal atrial fibrillation, hypertension, CKD4, hyperlipidemia, diabetes mellitus type 2, history of CVA, hypothyroidism, 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  - stable     QUINTON on CKD stage 4- resolved  - BLE edema  - IVF, renal consult appreciated   - hold ARB, restart when ok w renal     Anemia, chronic  - likely from CKD, slight downtrend noted  - venofer      PAF  - convert back to oral meds, off heparin gtt     Dysphagia  -passed video: puree w nectar thcik liquids  - ensure     Essential HTN  - metoprolol, losartan on HOLD     Hyperlipidemia  - statin     Hx CVA  - asa, statin     Hypothyroidism   - sytnhroid      Constipation  - trial lactulose, bowel regimen      FEN: regular diet, PT/OT  Proph: SCDs, heparin gtt  Code status: Full code             4/3 Nephrology    ASSESSMENT/PLAN:      QUINTON on CKD (baseline 1.5-1.7) - prerenal due to volume depletion - improving   Hypernatremia  Anemia -iron deficient  Hypertension/diastolic CHF -PTA regimen: Lasix 40 twice daily, metoprolol 12.5 mg twice daily, losartan 100 mg daily  COPD exacerbation with RSV bronchitis        Stop D5W  Encourage PO intake  Will add diuretics today  Hold losartan at this time, can introduce if needed for BP control  Monitor hyperglycemia  Avoid nephrotoxins and renally dose medications for creatinine clearance            MEDICATIONS ADMINISTERED IN LAST 1 DAY:  acetylcysteine (Mucomyst) 20 % nebulizer solution 2 mL       Date Action Dose Route User    4/4/2024 0901 Given 2 mL Nebulization Anabel Mcdermott, RCP    4/3/2024 2102 Given 2 mL Nebulization Nirmala Allred          apixaban (Eliquis) tab 2.5 mg        Date Action Dose Route User    4/4/2024 0834 Given 2.5 mg Oral Flory Grant RN    4/3/2024 2044 Given 2.5 mg Oral Emely Mckeon RN          atorvastatin (Lipitor) tab 40 mg       Date Action Dose Route User    4/3/2024 2044 Given 40 mg Oral Emely Mckeon RN          multivitamin (Centrum) chewable tab (Adult) 1 tablet       Date Action Dose Route User    4/4/2024 0835 Given 1 tablet Oral Flory Grant RN          fluticasone furoate-vilanterol (Breo Ellipta) 200-25 MCG/ACT inhaler 1 puff       Date Action Dose Route User    4/4/2024 0834 Given 1 puff Inhalation Flory Grant RN          furosemide (Lasix) tab 40 mg       Date Action Dose Route User    4/4/2024 0834 Given 40 mg Oral Flory Grant RN    4/3/2024 1406 Given 40 mg Oral Flory Grant RN          guaiFENesin (Mucinex) tab 400 mg       Date Action Dose Route User    4/4/2024 0834 Given 400 mg Oral Flory Grant RN    4/4/2024 0506 Given 400 mg Oral Emely Mckeon RN    4/4/2024 0131 Given 400 mg Oral Emely Mckeon RN    4/3/2024 2044 Given 400 mg Oral Emely Mckeon RN    4/3/2024 1700 Given 400 mg Oral Flory Grant RN    4/3/2024 1220 Given 400 mg Oral Flory Grant RN          insulin aspart (NovoLOG) 100 Units/mL FlexPen 2-10 Units       Date Action Dose Route User    4/3/2024 2052 Given 8 Units Subcutaneous (Left Lower Abdomen) Emely Mckeon RN    4/3/2024 1645 Given 10 Units Subcutaneous (Left Lower Abdomen) Flory Grant RN    4/3/2024 1148 Given 3 Units Subcutaneous (Left Upper Abdomen) Flory Grant RN          insulin degludec 100 units/mL flextouch 20 Units       Date Action Dose Route User    4/3/2024 2052 Given 20 Units Subcutaneous (Left Lower Abdomen) Emely Mckeon RN          ipratropium-albuterol (Duoneb) 0.5-2.5 (3) MG/3ML inhalation solution 3 mL       Date Action Dose Route User    4/3/2024 2103 Given 3 mL Nebulization Nirmala Allred          ipratropium-albuterol (Duoneb) 0.5-2.5 (3)  MG/3ML inhalation solution 3 mL       Date Action Dose Route User    4/4/2024 0901 Given 3 mL Nebulization Anabel Mcdermott RCP    4/4/2024 0110 Given 3 mL Nebulization Nuzhat Lockhart RCP    4/3/2024 1624 Given 3 mL Nebulization Bruno Kramer, KAIN          levothyroxine (Synthroid) tab 50 mcg       Date Action Dose Route User    4/4/2024 0834 Given 50 mcg Oral Flory Grant RN          metoprolol tartrate (Lopressor) partial tab 12.5 mg       Date Action Dose Route User    4/4/2024 0506 Given 12.5 mg Oral Emely Mckeon RN    4/3/2024 1700 Given 12.5 mg Oral Flory Grant RN          pantoprazole (Protonix) injection 40 mg       Date Action Dose Route User    4/4/2024 0514 Given 40 mg Intravenous Emely Mckeon RN          piperacillin-tazobactam (Zosyn) 3.375 g in dextrose 5% 100 mL IVPB-ADDV       Date Action Dose Route User    4/4/2024 0510 New Bag 3.375 g Intravenous Emely Mckeon RN    4/3/2024 2050 New Bag 3.375 g Intravenous Emely Mckeon RN    4/3/2024 1220 New Bag 3.375 g Intravenous Flory Grant RN          predniSONE (Deltasone) tab 20 mg       Date Action Dose Route User    4/4/2024 0835 Given 20 mg Oral Flory Garnt RN          sodium chloride (Saline Mist) 0.65 % nasal solution 1 spray       Date Action Dose Route User    4/3/2024 1700 Given 1 spray Each Nare Flory Grant RN          sodium chloride 7 % nebulizer solution 4 mL       Date Action Dose Route User    4/4/2024 0902 Given 4 mL Nebulization Anabel Mcdermott RCP    4/4/2024 0110 Given 4 mL Nebulization Nuzhat Lockhart RCP    4/3/2024 1634 Given by Other 4 mL Nebulization Bruno Kramer, KAIN          tamsulosin (Flomax) cap 0.4 mg       Date Action Dose Route User    4/3/2024 2044 Given 0.4 mg Oral Emely Mckeon RN            Vitals (last day)       Date/Time Temp Pulse Resp BP SpO2 Weight O2 Device O2 Flow Rate (L/min) [facility]    04/04/24 0910 -- -- -- -- 96 % -- None (Room air) -- EI    04/04/24 0845 98 °F (36.7 °C) --  18 -- -- -- -- -- WB    04/04/24 0500 98.3 °F (36.8 °C) 74 18 142/36 94 % -- None (Room air) -- TC    04/04/24 0120 -- 87 22 -- 98 % -- -- -- TK    04/04/24 0110 -- 86 20 -- 92 % -- None (Room air) -- TK    04/03/24 2300 97.8 °F (36.6 °C) 81 16 122/47 93 % -- -- -- AN    04/03/24 2100 98 °F (36.7 °C) 65 16 130/53 100 % -- None (Room air) -- AN    04/03/24 1624 97.8 °F (36.6 °C) 72 20 156/56 -- -- -- -- TJ    04/03/24 1624 -- -- -- -- 95 % -- None (Room air) -- BD    04/03/24 1115 98.3 °F (36.8 °C) 92 16 141/60 93 % -- High flow nasal cannula 2 L/min TJ    04/03/24 0900 -- -- -- -- 90 % -- None (Room air) -- LR    04/03/24 0745 -- -- 20 -- -- -- Nasal cannula 3 L/min KW    04/03/24 0745 98.2 °F (36.8 °C) 61 -- 141/63 100 % -- -- -- TJ    04/03/24 0532 -- -- -- -- -- -- None (Room air) -- TC    04/03/24 0413 -- -- -- -- -- -- Nasal cannula 2 L/min TC    04/03/24 0413 97.6 °F (36.4 °C) 69 18 140/65 -- -- -- -- AC

## 2024-04-04 NOTE — PLAN OF CARE
Patient is a/o x4. Albanian speaking. On room air. PRN 2L with ambulation. Afib on tele. Nectar thick liquids and puree diet. QID accu-checks. BLE edema +2. Primofit in place. Incontinent of bladder. IV zosyn. PO prednisone. Up SBA and walker. Takes meds crushed in apple sauce. Safety precautions in place. No further needs at this time.  Problem: RESPIRATORY - ADULT  Goal: Achieves optimal ventilation and oxygenation  Description: INTERVENTIONS:  - Assess for changes in respiratory status  - Assess for changes in mentation and behavior  - Position to facilitate oxygenation and minimize respiratory effort  - Oxygen supplementation based on oxygen saturation or ABGs  - Provide Smoking Cessation handout, if applicable  - Encourage broncho-pulmonary hygiene including cough, deep breathe, Incentive Spirometry  - Assess the need for suctioning and perform as needed  - Assess and instruct to report SOB or any respiratory difficulty  - Respiratory Therapy support as indicated  - Manage/alleviate anxiety  - Monitor for signs/symptoms of CO2 retention  Outcome: Progressing     Problem: Patient/Family Goals  Goal: Patient/Family Long Term Goal  Description: Patient's Long Term Goal: discharge home    Interventions:  - See additional Care Plan goals for specific interventions  Outcome: Progressing  Goal: Patient/Family Short Term Goal  Description: Patient's Short Term Goal:   3/28: Maintain safety  3/29: maintain safety  03/29/2024 - Blood sugar control  3/30 AM - Wean O2  3/30 noc: decrease sob  4/1 am: GO FOR VIDEO SWALLOW AND PASS - EAT  4/2 am: have a bowel movement  4/2 NOC: sleep  4/3 noc: manage cough    Interventions:   - insulin as ordered  - supp O2, nebs, Flutter  - take medications per MAR  - See additional Care Plan goals for specific interventions  Outcome: Progressing     Problem: CARDIOVASCULAR - ADULT  Goal: Maintains optimal cardiac output and hemodynamic stability  Description: INTERVENTIONS:  - Monitor vital  signs, rhythm, and trends  - Monitor for bleeding, hypotension and signs of decreased cardiac output  - Evaluate effectiveness of vasoactive medications to optimize hemodynamic stability  - Monitor arterial and/or venous puncture sites for bleeding and/or hematoma  - Assess quality of pulses, skin color and temperature  - Assess for signs of decreased coronary artery perfusion - ex. Angina  - Evaluate fluid balance, assess for edema, trend weights  Outcome: Progressing  Goal: Absence of cardiac arrhythmias or at baseline  Description: INTERVENTIONS:  - Continuous cardiac monitoring, monitor vital signs, obtain 12 lead EKG if indicated  - Evaluate effectiveness of antiarrhythmic and heart rate control medications as ordered  - Initiate emergency measures for life threatening arrhythmias  - Monitor electrolytes and administer replacement therapy as ordered  Outcome: Progressing     Problem: GASTROINTESTINAL - ADULT  Goal: Maintains or returns to baseline bowel function  Description: INTERVENTIONS:  - Assess bowel function  - Maintain adequate hydration with IV or PO as ordered and tolerated  - Evaluate effectiveness of GI medications  - Encourage mobilization and activity  - Obtain nutritional consult as needed  - Establish a toileting routine/schedule  - Consider collaborating with pharmacy to review patient's medication profile  Outcome: Progressing  Goal: Maintains adequate nutritional intake (undernourished)  Description: INTERVENTIONS:  - Monitor percentage of each meal consumed  - Identify factors contributing to decreased intake, treat as appropriate  - Assist with meals as needed  - Monitor I&O, WT and lab values  - Obtain nutritional consult as needed  - Optimize oral hygiene and moisture  - Encourage food from home; allow for food preferences  - Enhance eating environment  Outcome: Progressing

## 2024-04-04 NOTE — PLAN OF CARE
Patient is Aox4. Hungarian speaking. Family at bedside. Room air, . Nebs. Meds crushed with apple sauce. Pureed diet, nectar thick. Tele afib, on eliquis. Daily weights. Inc x2, primofit. LBM 4/2. Family at bedside, updated on plan of care.    Problem: RESPIRATORY - ADULT  Goal: Achieves optimal ventilation and oxygenation  Description: INTERVENTIONS:  - Assess for changes in respiratory status  - Assess for changes in mentation and behavior  - Position to facilitate oxygenation and minimize respiratory effort  - Oxygen supplementation based on oxygen saturation or ABGs  - Provide Smoking Cess  Problem: Patient/Family Goals  Goal: Patient/Family Long Term Goal  Description: Patient's Long Term Goal: discharge home    Interventions:  - See additional Care Plan goals for specific interventions  Outcome: Progressing  Goal: Patient/Family Short Term Goal  Description: Patient's Short Term Goal:   3/28: Maintain safety  3/29: maintain safety  03/29/2024 - Blood sugar control  3/30 AM - Wean O2  3/30 noc: decrease sob  4/1 am: GO FOR VIDEO SWALLOW AND PASS - EAT  4/2 am: have a bowel movement  4/2 NOC: sleep  4/3 noc: manage cough    Interventions:   - insulin as ordered  - supp O2, nebs, Flutter  - take medications per MAR  - See additional Care Plan goals for specific interventions  Outcome: Progressing     Problem: CARDIOVASCULAR - ADULT  Goal: Maintains optimal cardiac output and hemodynamic stability  Description: INTERVENTIONS:  - Monitor vital signs, rhythm, and trends  - Monitor for bleeding, hypotension and signs of decreased cardiac output  - Evaluate effectiveness of vasoactive medications to optimize hemodynamic stability  - Monitor arterial and/or venous puncture sites for bleeding and/or hematoma  - Assess quality of pulses, skin color and temperature  - Assess for signs of decreased coronary artery perfusion - ex. Angina  - Evaluate fluid balance, assess for edema, trend weights  Outcome: Progressing  Goal:  Absence of cardiac arrhythmias or at baseline  Description: INTERVENTIONS:  - Continuous cardiac monitoring, monitor vital signs, obtain 12 lead EKG if indicated  - Evaluate effectiveness of antiarrhythmic and heart rate control medications as ordered  - Initiate emergency measures for life threatening arrhythmias  - Monitor electrolytes and administer replacement therapy as ordered  Outcome: Progressing     Problem: GASTROINTESTINAL - ADULT  Goal: Maintains or returns to baseline bowel function  Description: INTERVENTIONS:  - Assess bowel function  - Maintain adequate hydration with IV or PO as ordered and tolerated  - Evaluate effectiveness of GI medications  - Encourage mobilization and activity  - Obtain nutritional consult as needed  - Establish a toileting routine/schedule  - Consider collaborating with pharmacy to review patient's medication profile  Outcome: Progressing  Goal: Maintains adequate nutritional intake (undernourished)  Description: INTERVENTIONS:  - Monitor percentage of each meal consumed  - Identify factors contributing to decreased intake, treat as appropriate  - Assist with meals as needed  - Monitor I&O, WT and lab values  - Obtain nutritional consult as needed  - Optimize oral hygiene and moisture  - Encourage food from home; allow for food preferences  - Enhance eating environment  Outcome: Progressing   ation handout, if applicable  - Encourage broncho-pulmonary hygiene including cough, deep breathe, Incentive Spirometry  - Assess the need for suctioning and perform as needed  - Assess and instruct to report SOB or any respiratory difficulty  - Respiratory Therapy support as indicated  - Manage/alleviate anxiety  - Monitor for signs/symptoms of CO2 retention  Outcome: Progressing

## 2024-04-04 NOTE — CM/SW NOTE
Met with patient and two daughters at bedside to follow up on discharge planning.     Informed them hospital bed was approved with [facility], will arrange delivery within 24 hours of discharge. They inquired on home oxygen and if not medically appropriate asked if they could pay for home oxygen. Informed them if not medically appropriate it wouldn't be recommended, private pay cost could be a few hundred dollars a month. Encouraged follow ups with pulmonology after discharge.    Discussed plan for patient to go to son Jaylin)'s home in [facility]. Provided updated HH provider list that services [place] and [facility] are his two options. [facility] unable to service that area. Informed them none of the accepting HH providers are able to staff [facility] at this time. Discussed [facility] and/or contacting [facility] for caregiver services. They brought up rehab, informed them therapy has continued to work with patient during hospitalization and anticipated benefit from HH. Discussed he is not appropriate for [facility] at this time, they were understanding.     Plan to follow up again tomorrow to confirm plan again.    Son (Clem) # [phone]  Address: [address] [place] [id number]       Addendum 3:48pm: Acknowledged order for nebulizer. Sent order to [facility] in [place]. Await approval.     4pm: Spoke with Lakshmi from [facility] regarding anticipated discharge tomorrow and neb order. Will monitor for home oxygen.         LORI Geronimo  Discharge Planner  [phone]

## 2024-04-04 NOTE — SLP NOTE
SPEECH DAILY NOTE - INPATIENT    ASSESSMENT & PLAN   ASSESSMENT  Pt seen for dysphagia tx to assess tolerance with recommended diet, ensure proper utilization of aspiration precautions and provide pt/family education.  Patient alert and oriented in bedside chair with multiple family member present at bedside. Patient reported improved appetite and good tolerance of PO intake. He endorsed ongoing chronic cough, but feels it has improved slightly. Patient demonstrated accurate completion of effortful swallow and [place] with mild cues. No overt s/s of aspiration observed with recommended diet during today's visit. Recommend patient continue pharyngeal strengthening exercises. Will plan to repeat VFSS in the coming days to evaluate for possible diet advancement. Education provided to patient and family who were agreeable to plan.    Diet Recommendations - Solids: Puree  Diet Recommendations - Liquids: Nectar thick liquids/ Mildly thick    Compensatory Strategies Recommended: Small bites and sips;Alternate consistencies;Multiple swallows (chin tuck)  Aspiration Precautions: Upright position  Medication Administration Recommendations: Crushed in puree    Patient Experiencing Pain: No                Treatment Plan  Treatment Plan/Recommendations: Dysphagia therapy;Videofluoroscopic swallow study    Interdisciplinary Communication: NA            GOALS  Goal #1 VFSS  Met   Goal #2 The patient will tolerate puree consistency and mildly thick liquids without overt signs or symptoms of aspiration with 95 % accuracy over 1-2 session(s).     In Progress   Goal #3 The patient will utilize compensatory strategies as outlined by  VFSS including Small bites, Small sips, Multiple swallows, Alternate liquids/solids, Chin tuck with mild assistance 90 % of the time across 2 sessions.  In Progress   Goal #4 Patient will complete pharyngeal strengthening exercises with 90% accuracy when provided mild cues within 3 visits.     In Progress    Goal #5       Goal #6       Goal #7       Goal #8          FOLLOW UP  Follow Up Needed (Documentation Required): Yes  SLP Follow-up Date: 04/05/24       Session: 2    If you have any questions, please contact JULIETA Baxter

## 2024-04-04 NOTE — PROGRESS NOTES
Pulmonary Progress Note        NAME: Hector Nguyen - ROOM: [address] [place], [place] [address] - MRN: [id number] - Age: 89 year old - : 1932        Last 24hrs: No events overnight, off O2 currently, legs are more swollen    OBJECTIVE:  Vitals:    24 0120 24 0500 24 0845 24 0910   BP:  142/36     BP Location:  Left arm     Pulse: 87 74     Resp: 22 18 18    Temp:  98.3 °F (36.8 °C) 98 °F (36.7 °C)    TempSrc:  Oral Oral    SpO2: 98% 94%  96%   Weight:       Height:           Oxygen Therapy  SpO2: 96 %  O2 Device: None (Room air)  O2 Flow Rate (L/min): 2 L/min  Pulse Oximetry Type: Continuous  Oximetry Probe Site Changed: No  Pulse Ox Probe Location: Right hand                  Intake/Output Summary (Last 24 hours) at 2024 0959  Last data filed at 2024 0100  Gross per 24 hour   Intake --   Output 1800 ml   Net -1800 ml       Scheduled Medication:   insulin degludec  12 Units Subcutaneous Nightly    furosemide  40 mg Oral Daily    apixaban  2.5 mg Oral BID    predniSONE  20 mg Oral Daily with breakfast    piperacillin-tazobactam  3.375 g Intravenous Q8H    guaiFENesin  400 mg Oral 6 times per day    insulin aspart  2-10 Units Subcutaneous TID AC and HS    multivitamin  1 tablet Oral Daily    sodium chloride  4 mL Nebulization Q8H DIONI    ipratropium-albuterol  3 mL Nebulization Q8H DIONI    pantoprazole  40 mg Intravenous Q24H    dilTIAZem  10 mg Intravenous Once    acetylcysteine  2 mL Nebulization BID    atorvastatin  40 mg Oral Nightly    fluticasone furoate-vilanterol  1 puff Inhalation Daily    levothyroxine  50 mcg Oral Daily    metoprolol tartrate  12.5 mg Oral 2x Daily(Beta Blocker)    tamsulosin  0.4 mg Oral Nightly     Continuous Infusing Medication:    Lungs: clear to auscultation bilaterally  Heart: S1, S2 normal, no murmur, click, rub or gallop, regular rate and rhythm  Abdomen: soft, non-tender; bowel sounds normal; no masses,  no organomegaly  Extremities: edema 1-2+ in LE shan    Labs  reviewed as noted below        ASSESSMENT/PLAN:    Acute hypoxic resp failure - secondary to COPD exac triggered by RSV infection and now with secondary bacterial PNA  -wean supplemental O2 as able- per PT note from 4/2 pt does not need O2  -has significant mucous plugging and inability to clear secretions- cont CPT, Mucomyst, suctioning  AECOPD - secondary to RSV with secondary bacterial pneumonia. May be due to aspiration given patient's previous poor swallow eval.  -prednisone, taper to 10mg tomorrow  -Breo  -BD protocol  Pleural effusion - chronic R effusion- unchanged  -tapped in past with trapped lung  -no indication for repeat thora  Pneumonia - likely aspiration- as above. Sputum culture with Klebsiella. MRSA swab negative  -await sensitivities  -zosyn (3/29-4/4 )  Abn CXR - there is a R lateral pleural based nodular density on the CXR that does not appear to have been present in the past  -outpatient follow up with Dr. Jones  Dysphagia - diet per speech  - family does not wish to pursue PEG  Proph - eliquis  Dispo - DNAR/select  -will follow  -dc planning      Kerwin Britt  [facility]  Pulmonary and Critical Care

## 2024-04-04 NOTE — PHYSICAL THERAPY NOTE
Attempted to see Pt this PM - RN aware of attempt.  Pt's family prefer tomorrow for PT.  Pt occupied with digesting lunch.  Will f/u later today if time permits, after all other patients are attempted per tentative schedule.

## 2024-04-05 LAB
ANION GAP SERPL CALC-SCNC: 5 MMOL/L (ref 0–18)
BUN BLD-MCNC: 39 MG/DL (ref 9–23)
CALCIUM BLD-MCNC: 8.6 MG/DL (ref 8.5–10.1)
CHLORIDE SERPL-SCNC: 112 MMOL/L (ref 98–112)
CO2 SERPL-SCNC: 25 MMOL/L (ref 21–32)
CREAT BLD-MCNC: 1.69 MG/DL
EGFRCR SERPLBLD CKD-EPI 2021: 38 ML/MIN/1.73M2 (ref 60–?)
ERYTHROCYTE [DISTWIDTH] IN BLOOD BY AUTOMATED COUNT: 16.1 %
GLUCOSE BLD-MCNC: 122 MG/DL (ref 70–99)
GLUCOSE BLD-MCNC: 160 MG/DL (ref 70–99)
GLUCOSE BLD-MCNC: 195 MG/DL (ref 70–99)
GLUCOSE BLD-MCNC: 236 MG/DL (ref 70–99)
GLUCOSE BLD-MCNC: 318 MG/DL (ref 70–99)
HCT VFR BLD AUTO: 31.8 %
HGB BLD-MCNC: 10.3 G/DL
MCH RBC QN AUTO: 25.8 PG (ref 26–34)
MCHC RBC AUTO-ENTMCNC: 32.4 G/DL (ref 31–37)
MCV RBC AUTO: 79.5 FL
OSMOLALITY SERPL CALC.SUM OF ELEC: 305 MOSM/KG (ref 275–295)
PLATELET # BLD AUTO: 263 10(3)UL (ref 150–450)
POTASSIUM SERPL-SCNC: 3.3 MMOL/L (ref 3.5–5.1)
RBC # BLD AUTO: 4 X10(6)UL
SODIUM SERPL-SCNC: 142 MMOL/L (ref 136–145)
WBC # BLD AUTO: 19.9 X10(3) UL (ref 4–11)

## 2024-04-05 PROCEDURE — 85027 COMPLETE CBC AUTOMATED: CPT | Performed by: INTERNAL MEDICINE

## 2024-04-05 PROCEDURE — 92526 ORAL FUNCTION THERAPY: CPT

## 2024-04-05 PROCEDURE — 82962 GLUCOSE BLOOD TEST: CPT

## 2024-04-05 PROCEDURE — 97116 GAIT TRAINING THERAPY: CPT

## 2024-04-05 PROCEDURE — 80048 BASIC METABOLIC PNL TOTAL CA: CPT | Performed by: INTERNAL MEDICINE

## 2024-04-05 PROCEDURE — 94640 AIRWAY INHALATION TREATMENT: CPT

## 2024-04-05 PROCEDURE — 97530 THERAPEUTIC ACTIVITIES: CPT

## 2024-04-05 PROCEDURE — C9113 INJ PANTOPRAZOLE SODIUM, VIA: HCPCS | Performed by: HOSPITALIST

## 2024-04-05 RX ORDER — POTASSIUM CHLORIDE 20 MEQ/1
40 TABLET, EXTENDED RELEASE ORAL ONCE
Status: COMPLETED | OUTPATIENT
Start: 2024-04-05 | End: 2024-04-05

## 2024-04-05 NOTE — PLAN OF CARE
Patient aox4, Swedish speaking. Room air, neb tx. . Tele afib. On Eliquis. Daily weights. Puree diet, nectar thick liquids. QID accu checks. PO Prednisone. LBM 4/2. Incx2, primofit. IV Zosyn. Family at bedside. Safety precautions in place.    Problem: RESPIRATORY - ADULT  Goal: Achieves optimal ventilation and oxygenation  Description: INTERVENTIONS:  - Assess for changes in respiratory status  - Assess for changes in mentation and behavior  - Position to facilitate oxygenation and minimize respiratory effort  - Oxygen supplementation based on oxygen saturation or ABGs  - Provide Smoking Cessation handout, if applicable  - Encourage broncho-pulmonary hygiene including cough, deep breathe, Incentive Spirometry  - Assess the need for suctioning and perform as needed  - Assess and instruct to report SOB or any respiratory difficulty  - Respiratory Therapy support as indicated  - Manage/alleviate anxiety  - Monitor for signs/symptoms of CO2 retention  Outcome: Progressing     Problem: Patient/Family Goals  Goal: Patient/Family Long Term Goal  Description: Patient's Long Term Goal: discharge home    Interventions:  - See additional Care Plan goals for specific interventions  Outcome: Progressing  Goal: Patient/Family Short Term Goal  Description: Patient's Short Term Goal:   3/28: Maintain safety  3/29: maintain safety  03/29/2024 - Blood sugar control  3/30 AM - Wean O2  3/30 noc: decrease sob  4/1 am: GO FOR VIDEO SWALLOW AND PASS - EAT  4/2 am: have a bowel movement  4/2 NOC: sleep  4/3 noc: manage cough    Interventions:   - insulin as ordered  - supp O2, nebs, Flutter  - take medications per MAR  - See additional Care Plan goals for specific interventions  Outcome: Progressing     Problem: Patient/Family Goals  Goal: Patient/Family Short Term Goal  Description: Patient's Short Term Goal:   3/28: Maintain safety  3/29: maintain safety  03/29/2024 - Blood sugar control  3/30 AM - Wean O2  3/30 noc: decrease sob  4/1  am: GO FOR VIDEO SWALLOW AND PASS - EAT  4/2 am: have a bowel movement  4/2 NOC: sleep  4/3 noc: manage cough    Interventions:   - insulin as ordered  - supp O2, nebs, Flutter  - take medications per MAR  - See additional Care Plan goals for specific interventions  Outcome: Progressing     Problem: CARDIOVASCULAR - ADULT  Goal: Maintains optimal cardiac output and hemodynamic stability  Description: INTERVENTIONS:  - Monitor vital signs, rhythm, and trends  - Monitor for bleeding, hypotension and signs of decreased cardiac output  - Evaluate effectiveness of vasoactive medications to optimize hemodynamic stability  - Monitor arterial and/or venous puncture sites for bleeding and/or hematoma  - Assess quality of pulses, skin color and temperature  - Assess for signs of decreased coronary artery perfusion - ex. Angina  - Evaluate fluid balance, assess for edema, trend weights  Outcome: Progressing  Goal: Absence of cardiac arrhythmias or at baseline  Description: INTERVENTIONS:  - Continuous cardiac monitoring, monitor vital signs, obtain 12 lead EKG if indicated  - Evaluate effectiveness of antiarrhythmic and heart rate control medications as ordered  - Initiate emergency measures for life threatening arrhythmias  - Monitor electrolytes and administer replacement therapy as ordered  Outcome: Progressing     Problem: GASTROINTESTINAL - ADULT  Goal: Maintains or returns to baseline bowel function  Description: INTERVENTIONS:  - Assess bowel function  - Maintain adequate hydration with IV or PO as ordered and tolerated  - Evaluate effectiveness of GI medications  - Encourage mobilization and activity  - Obtain nutritional consult as needed  - Establish a toileting routine/schedule  - Consider collaborating with pharmacy to review patient's medication profile  Outcome: Progressing  Goal: Maintains adequate nutritional intake (undernourished)  Description: INTERVENTIONS:  - Monitor percentage of each meal consumed  -  Identify factors contributing to decreased intake, treat as appropriate  - Assist with meals as needed  - Monitor I&O, WT and lab values  - Obtain nutritional consult as needed  - Optimize oral hygiene and moisture  - Encourage food from home; allow for food preferences  - Enhance eating environment  Outcome: Progressing

## 2024-04-05 NOTE — PROGRESS NOTES
Counts include 234 beds at the [facility] and [facility]  Hospitalist Progress Note                                                                     [facility]   part of [facility] GABE Nguyen  12/8/1932    SUBJECTIVE:    Sitting up watching show on phone.  90% 02 w ambulation.  More awake today.  Edema slight improvement.      OBJECTIVE:  Temp:  [97.4 °F (36.3 °C)-98.2 °F (36.8 °C)] 98.1 °F (36.7 °C)  Pulse:  [63-74] 74  Resp:  [16-18] 18  BP: (125-143)/(38-65) 125/46  SpO2:  [93 %-95 %] 93 %  Exam  Gen: No acute distress, alert and oriented x3, no focal neurologic deficits  Pulm: intermittnt romchi , NO wheezing.    CV: Heart with regular rate and rhythm, no murmur.  Normal PMI.    Abd: Abdomen soft, nontender, nondistended, no organomegaly, bowel sounds present  MSK: Full range of motion in extremities, no clubbing, no cyanosis  Skin: + edema to thighs    Labs:   Recent Labs   Lab 03/30/24  0616 03/31/24  0710 04/01/24  0644 04/02/24  0644 04/03/24  1014 04/04/24  0556 04/05/24  0614   WBC 16.3*   < > 19.1* 19.3* 20.7* 19.7* 19.9*   HGB 9.0*   < > 9.9* 10.0* 10.5* 10.1* 10.3*   MCV 80.4   < > 82.1 80.0 80.9 77.6* 79.5*   .0   < > 229.0 199.0 226.0 238.0 263.0   BAND 44  --   --   --   --   --   --     < > = values in this interval not displayed.       Recent Labs   Lab 04/01/24 1958 04/02/24  0644 04/03/24  1014 04/04/24  0556 04/05/24  0614   * 143 144 144 142   K 3.5 3.9 3.6 3.3* 3.3*   * 114* 112 112 112   CO2 22.0 22.0 26.0 25.0 25.0   BUN 76* 72* 48* 41* 39*   CREATSERUM 2.09* 1.94* 1.60* 1.59* 1.69*   CA 9.1 8.7 8.6 8.6 8.6   MG  --   --   --  2.3  --    * 189* 138* 126* 122*       Recent Labs   Lab 03/31/24  1741 04/01/24 1958   ALT 42 35   AST 21 15   ALB 2.2* 2.1*       Recent Labs   Lab 04/04/24  1202 04/04/24  1707 04/04/24  2200 04/05/24  0457 04/05/24  1201   PGLU 157* 337* 339* 160* 195*       Meds:   Scheduled:    insulin degludec   12 Units Subcutaneous Nightly    predniSONE  10 mg Oral Daily with breakfast    furosemide  40 mg Oral BID (Diuretic)    apixaban  2.5 mg Oral BID    guaiFENesin  400 mg Oral 6 times per day    insulin aspart  2-10 Units Subcutaneous TID AC and HS    multivitamin  1 tablet Oral Daily    sodium chloride  4 mL Nebulization Q8H DIONI    ipratropium-albuterol  3 mL Nebulization Q8H DIONI    pantoprazole  40 mg Intravenous Q24H    dilTIAZem  10 mg Intravenous Once    acetylcysteine  2 mL Nebulization BID    atorvastatin  40 mg Oral Nightly    fluticasone furoate-vilanterol  1 puff Inhalation Daily    levothyroxine  50 mcg Oral Daily    metoprolol tartrate  12.5 mg Oral 2x Daily(Beta Blocker)    tamsulosin  0.4 mg Oral Nightly     Continuous Infusions:       PRN: albuterol, lactulose, polyethylene glycol (PEG 3350), sodium chloride, glycerin-hypromellose-, sennosides, bisacodyl, glucose **OR** glucose **OR** glucose-vitamin C **OR** dextrose **OR** glucose **OR** glucose **OR** glucose-vitamin C, acetaminophen, alum-mag hydroxide-simethicone, acetaminophen, melatonin, ondansetron, metoclopramide, benzonatate, ipratropium-albuterol, benzocaine-menthol, acetaminophen, benzocaine-menthol    Assessment/Plan:  Principal Problem:    COPD exacerbation (HCC)  Active Problems:    Pleural effusion on right    Acute bronchiolitis due to respiratory syncytial virus (RSV)    89 year old male with PMH sig for COPD, paroxysmal atrial fibrillation, hypertension, CKD4, hyperlipidemia, diabetes mellitus type 2, history of CVA, hypothyroidism, 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     Acute hypoxic respiratory failure   Acute COPD exacerbation 2/2 RSV Bronchitis  Increase R lung infiltrate, likely secondary bacterial PNA, possible aspiration pna  - PO steroids  -- weaned to RA, family would like comfort 02 for home  - started zosyn 3/29- to complete 7 d course  - scheduled  nebuilzers, add hypertonic saline   - breo, BD protocol  - supportive care  - pulm following     Sepsis from above- resolved  - treat PNA, sp sozsyn   - plan as above      DM2 w steroid induced hyperglycemia  - BG reviewed:  with little po and reducing steroid dosing will decrease degludec to 12 u   - may need insulin on dc while on steroids- dw family.  Education provided.    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  - stable     QUINTON on CKD stage 4- resolved  - BLE edema  - IVF, renal consult appreciated   - restart arb, po lasix    Anemia, chronic  - likely from CKD, slight downtrend noted  - venofer      PAF  - cont metoprool and eliquis     Dysphagia  -passed video: puree w nectar thcik liquids  - ensure     Essential HTN  - metoprolol, losartan now resumed    Hyperlipidemia  - statin     Hx CVA  - asa, statin     Hypothyroidism   - sytnhroid     Constipation  - trial lactulose, bowel regimen      FEN: regular diet, PT/OT  Proph: SCDs, heparin gtt  Code status: Full code     Dispo - cont to monitor, possible dc home tomorrow      Estiven Chatterjee  [facility]  Internal Medicine  Answering Service number: [phone]

## 2024-04-05 NOTE — PLAN OF CARE
Patient AAOx4, mainly Khmer speaking. Room air. Tele afib controlled. Eliquis. BM today. Primofit. Assist x1/2 with walker. Worked with physical therapy today, passed O2 walk without O2. Seen by speech, advanced to soft/bite sized diet, nectar thick, meds crushed in applesauce. Video swallow ordered for tomorrow. Plan for DC home tomorrow. Patient rounded on routinely. Patient and family updated on plan of care.    Began insulin education with daughter at bedside, demonstrated use of insulin pen. Ongoing teaching encouraged.      Problem: RESPIRATORY - ADULT  Goal: Achieves optimal ventilation and oxygenation  Description: INTERVENTIONS:  - Assess for changes in respiratory status  - Assess for changes in mentation and behavior  - Position to facilitate oxygenation and minimize respiratory effort  - Oxygen supplementation based on oxygen saturation or ABGs  - Provide Smoking Cessation handout, if applicable  - Encourage broncho-pulmonary hygiene including cough, deep breathe, Incentive Spirometry  - Assess the need for suctioning and perform as needed  - Assess and instruct to report SOB or any respiratory difficulty  - Respiratory Therapy support as indicated  - Manage/alleviate anxiety  - Monitor for signs/symptoms of CO2 retention  Outcome: Progressing     Problem: Patient/Family Goals  Goal: Patient/Family Long Term Goal  Description: Patient's Long Term Goal: discharge home    Interventions:  - See additional Care Plan goals for specific interventions  Outcome: Progressing  Goal: Patient/Family Short Term Goal  Description: Patient's Short Term Goal:   3/28: Maintain safety  3/29: maintain safety  03/29/2024 - Blood sugar control  3/30 AM - Wean O2  3/30 noc: decrease sob  4/1 am: GO FOR VIDEO SWALLOW AND PASS - EAT  4/2 am: have a bowel movement  4/2 NOC: sleep  4/3 noc: manage cough    Interventions:   - insulin as ordered  - supp O2, nebs, Flutter  - take medications per MAR  - See additional Care Plan goals  for specific interventions  Outcome: Progressing     Problem: CARDIOVASCULAR - ADULT  Goal: Maintains optimal cardiac output and hemodynamic stability  Description: INTERVENTIONS:  - Monitor vital signs, rhythm, and trends  - Monitor for bleeding, hypotension and signs of decreased cardiac output  - Evaluate effectiveness of vasoactive medications to optimize hemodynamic stability  - Monitor arterial and/or venous puncture sites for bleeding and/or hematoma  - Assess quality of pulses, skin color and temperature  - Assess for signs of decreased coronary artery perfusion - ex. Angina  - Evaluate fluid balance, assess for edema, trend weights  Outcome: Progressing  Goal: Absence of cardiac arrhythmias or at baseline  Description: INTERVENTIONS:  - Continuous cardiac monitoring, monitor vital signs, obtain 12 lead EKG if indicated  - Evaluate effectiveness of antiarrhythmic and heart rate control medications as ordered  - Initiate emergency measures for life threatening arrhythmias  - Monitor electrolytes and administer replacement therapy as ordered  Outcome: Progressing     Problem: GASTROINTESTINAL - ADULT  Goal: Maintains or returns to baseline bowel function  Description: INTERVENTIONS:  - Assess bowel function  - Maintain adequate hydration with IV or PO as ordered and tolerated  - Evaluate effectiveness of GI medications  - Encourage mobilization and activity  - Obtain nutritional consult as needed  - Establish a toileting routine/schedule  - Consider collaborating with pharmacy to review patient's medication profile  Outcome: Progressing  Goal: Maintains adequate nutritional intake (undernourished)  Description: INTERVENTIONS:  - Monitor percentage of each meal consumed  - Identify factors contributing to decreased intake, treat as appropriate  - Assist with meals as needed  - Monitor I&O, WT and lab values  - Obtain nutritional consult as needed  - Optimize oral hygiene and moisture  - Encourage food from  home; allow for food preferences  - Enhance eating environment  Outcome: Progressing

## 2024-04-05 NOTE — PROGRESS NOTES
Pulmonary Progress Note        NAME: Hector Nguyen - ROOM: [address] [place] [address] - MRN: [id number] - Age: 89 year old - : 1932        Last 24hrs: No events overnight, repeating swallow study today, will do O2 assessment today as well    OBJECTIVE:  Vitals:    24 2128 24 2355 24 0500 24 0821   BP: 136/49 140/38 135/65 143/43   BP Location: Left arm Left arm Left arm Left arm   Pulse: 66 63 74    Resp: 17 16 18 18   Temp: 98.2 °F (36.8 °C) 97.4 °F (36.3 °C) 98.2 °F (36.8 °C) 97.8 °F (36.6 °C)   TempSrc: Oral Oral Oral Oral   SpO2: 93% 95% 93% 93%   Weight:       Height:           Oxygen Therapy  SpO2: 93 %  O2 Device: None (Room air)  O2 Flow Rate (L/min): 2 L/min  Pulse Oximetry Type: Continuous  Oximetry Probe Site Changed: No  Pulse Ox Probe Location: Right hand                  Intake/Output Summary (Last 24 hours) at 2024 0854  Last data filed at 2024 0500  Gross per 24 hour   Intake --   Output 2400 ml   Net -2400 ml       Scheduled Medication:   insulin degludec  12 Units Subcutaneous Nightly    predniSONE  10 mg Oral Daily with breakfast    furosemide  40 mg Oral BID (Diuretic)    apixaban  2.5 mg Oral BID    guaiFENesin  400 mg Oral 6 times per day    insulin aspart  2-10 Units Subcutaneous TID AC and HS    multivitamin  1 tablet Oral Daily    sodium chloride  4 mL Nebulization Q8H DIONI    ipratropium-albuterol  3 mL Nebulization Q8H DIONI    pantoprazole  40 mg Intravenous Q24H    dilTIAZem  10 mg Intravenous Once    acetylcysteine  2 mL Nebulization BID    atorvastatin  40 mg Oral Nightly    fluticasone furoate-vilanterol  1 puff Inhalation Daily    levothyroxine  50 mcg Oral Daily    metoprolol tartrate  12.5 mg Oral 2x Daily(Beta Blocker)    tamsulosin  0.4 mg Oral Nightly     Continuous Infusing Medication:    Lungs:  slightly diminished in right base, clear on left  Heart: S1, S2 normal, no murmur, click, rub or gallop, regular rate and rhythm  Abdomen: soft, non-tender;  bowel sounds normal; no masses,  no organomegaly  Extremities: edema 1+ LE shan    Labs reviewed as noted below        ASSESSMENT/PLAN:    Acute hypoxic resp failure - secondary to COPD exac triggered by RSV infection and now with secondary bacterial PNA  -weaned to RA  -has significant mucous plugging and inability to clear secretions- cont CPT, Mucomyst, suctioning  AECOPD - secondary to RSV with secondary bacterial pneumonia. May be due to aspiration given patient's previous poor swallow eval.  -prednisone, taper to 10mg today  -Breo  -BD protocol  -needs home nebulizer  Pleural effusion - chronic R effusion- unchanged  -tapped in past with trapped lung  -no indication for repeat thora  Pneumonia - likely aspiration- as above. Sputum culture with Klebsiella. MRSA swab negative  -zosyn (3/29-4/4 )  Abn CXR - there is a R lateral pleural based nodular density on the CXR that does not appear to have been present in the past  -outpatient follow up with Dr. Jones  Dysphagia - diet per speech  - family does not wish to pursue PEG  Proph - eliquis  Dispo - DNAR/select  -will follow  -dc planning      Kerwin Britt  [facility]  Pulmonary and Critical Care

## 2024-04-05 NOTE — PROGRESS NOTES
Nephrology Progress Note    Hector Nguyen Attending:  Michele Chatterjee MD       SUBJECTIVE:     Patient seen and examined at bedside.     PHYSICAL EXAM:     Vital Signs: /46 (BP Location: Left arm)   Pulse 74   Temp 98.1 °F (36.7 °C) (Oral)   Resp 18   Ht 5' 7\" (1.702 m)   Wt 137 lb (62.1 kg)   SpO2 93%   BMI 21.46 kg/m²   Temp (24hrs), Av °F (36.7 °C), Min:97.4 °F (36.3 °C), Max:98.2 °F (36.8 °C)       Intake/Output Summary (Last 24 hours) at 2024 1255  Last data filed at 2024 0900  Gross per 24 hour   Intake 240 ml   Output 1700 ml   Net -1460 ml     Wt Readings from Last 3 Encounters:   24 137 lb (62.1 kg)   22 133 lb (60.3 kg)   22 133 lb 2.5 oz (60.4 kg)       General: pleasant  HEENT: NCAT  Lungs: no distress  Abdomen: soft, non-tender  Extremities: trace LE edema  Neurologic/Psych: awake, alert     LABORATORY DATA:     Lab Results   Component Value Date     (H) 2024    BUN 39 (H) 2024    BUNCREA 18.0 02/10/2022    CREATSERUM 1.69 (H) 2024    ANIONGAP 5 2024    GFRNAA 39 (L) 2022    GFRAA 45 (L) 2022    CA 8.6 2024    OSMOCALC 305 (H) 2024    ALKPHO 109 2024    AST 15 2024    ALT 35 2024    BILT 0.9 2024    TP 6.7 2024    ALB 2.1 (L) 2024    GLOBULIN 4.6 (H) 2024     2024    K 3.3 (L) 2024     2024    CO2 25.0 2024     Lab Results   Component Value Date    WBC 19.9 (H) 2024    RBC 4.00 2024    HGB 10.3 (L) 2024    HCT 31.8 (L) 2024    .0 2024    MCV 79.5 (L) 2024    MCH 25.8 (L) 2024    MCHC 32.4 2024    RDW 16.1 2024    NEPRELIM 14.87 (H) 2024    NEUTABS 15.00 (H) 2024    LYMPHABS 0.65 (L) 2024    EOSABS 0.33 2024    BASABS 0.00 2024    NEUT 48 2024    LYMPH 4 2024    MON 2 2024    EOS 2 2024    BASO 0  03/30/2024    NEPERCENT 87.9 03/28/2024    LYPERCENT 1.9 03/28/2024    MOPERCENT 9.7 03/28/2024    EOPERCENT 0.0 03/28/2024    BAPERCENT 0.1 03/28/2024    NE 12.81 (H) 03/28/2024    LYMABS 0.27 (L) 03/28/2024    MOABSO 1.41 (H) 03/28/2024    EOABSO 0.00 03/28/2024    BAABSO 0.02 03/28/2024     Lab Results   Component Value Date    MALBP 13.3 01/26/2022    CREUR 81.40 03/30/2024     Lab Results   Component Value Date    COLORUR Yellow 02/22/2022    CLARITY Clear 02/22/2022    SPECGRAVITY 1.015 02/22/2022    GLUUR >=500 (A) 02/22/2022    BILUR Negative 02/22/2022    KETUR 20 (A) 02/22/2022    BLOODURINE Moderate (A) 02/22/2022    PHURINE 7.0 02/22/2022    PROUR 30 (A) 02/22/2022    UROBILINOGEN <2.0 02/22/2022    NITRITE Negative 02/22/2022    LEUUR Negative 02/22/2022    WBCUR 1-5 02/22/2022    RBCUR 0-2 02/22/2022    EPIUR None Seen 02/22/2022    BACUR None Seen 02/22/2022     IMAGING:     Reviewed    ASSESSMENT/PLAN:     QUINTON on CKD (baseline 1.5-1.7) - prerenal due to volume depletion - improved  Hypernatremia - improved  Anemia -iron deficient  Hypertension/diastolic CHF -PTA regimen: Lasix 40 twice daily, metoprolol 12.5 mg twice daily, losartan 100 mg daily  COPD exacerbation with RSV bronchitis          Encourage PO intake  Continue lasix 40mg BID (home dose), may need lower dose on discharge   Okay to resume losartan for better BP control  Monitor hyperglycemia  S/p IV iron, no indication for EPO currently  Avoid nephrotoxins and renally dose medications for creatinine clearance            Brigette Troy, DO  [facility] - Nephrology

## 2024-04-05 NOTE — SLP NOTE
SPEECH DAILY NOTE - INPATIENT    ASSESSMENT & PLAN   ASSESSMENT  Pt seen for dysphagia tx to assess tolerance with recommended diet, ensure appropriate utilization of aspiration precautions and provide pt/family education. Per RN, family requesting repeat video swallow study to objectively assess for advance in diet. Video swallow study completed 4/1/24 revealed moderate oropharyngeal dysphagia with deep laryngeal penetration of thin liquids and recommended pureed diet with nectar thick liquids. Most recent dysphagia tx note stated plan to repeat VFSS for possible advance in diet in coming days.     Pt found sitting upright in bedside chair; alert & participatory; able to follow commands with help from daughter present in room when interpretation needed. Patient requesting thin liquids and daughter inquiring re: repeat video swallow study to assess for advancement. Patient observed with po trials of thin via spoon and small, single sips from cup. Continued delay in pharyngeal response suspected with intermittent throat clear. Clear vocal quality following several po trials. Prolonged mastication but complete mastication of hard solids with mild oral residue noted post prandial requiring liquid wash. Agreed to advance to soft & bite sized and plan for repeat video swallow study in the am prior to advancement of liquids. RN informed of results and recommendations.     Diet Recommendations - Solids: Mechanical soft chopped/ Soft & Bite Sized  Diet Recommendations - Liquids: Nectar thick liquids/ Mildly thick    Compensatory Strategies Recommended: Small bites and sips;Alternate consistencies;Multiple swallows (chin tuck)  Aspiration Precautions: Upright position  Medication Administration Recommendations: Crushed in puree    Patient Experiencing Pain: No              GOALS  Goal #1 VFSS    Repeat VFSS in the am to assess for advance in diet to thin liquids  Met    New goal   Goal #2 The patient will tolerate puree  consistency and mildly thick liquids without overt signs or symptoms of aspiration with 95 % accuracy over 1-2 session(s).    Pt will tolerate soft & bite sized diet with mildly thick liquids without s/s of aspiration with 95% accuracy over 1 session    Met   Goal #3 The patient will utilize compensatory strategies as outlined by  VFSS including Small bites, Small sips, Multiple swallows, Alternate liquids/solids, Chin tuck with mild assistance 90 % of the time across 2 sessions.  In Progress   Goal #4 Patient will complete pharyngeal strengthening exercises with 90% accuracy when provided mild cues within 3 visits.     In Progress          Treatment Plan  Treatment Plan/Recommendations: Videofluoroscopic swallow study    Interdisciplinary Communication: Discussed with RN  daughter              FOLLOW UP  Follow Up Needed (Documentation Required): Yes  SLP Follow-up Date: 04/06/24  Number of Visits to Meet Established Goals: 2    Session: 3    If you have any questions, please contact Allie HORN, SLP    Allie Mcghee MA, CCC-SLP  Pager [phone]

## 2024-04-05 NOTE — PHYSICAL THERAPY NOTE
PHYSICAL THERAPY TREATMENT NOTE - INPATIENT    Room Number: 531/531-A       Session: 3    Number of Visits to Meet Established Goals: 5    Presenting Problem: SOB, COPD, RSV  Co-Morbidities : DM, A-fib, hx CVA    ASSESSMENT   Patient demonstrates good  progress this session, goals  remain in progress.    Patient continues to function below baseline with transfers, gait, and standing prolonged periods.  Contributing factors to remaining limitations include decreased functional strength, decreased endurance/aerobic capacity, impaired standing  balance, and decreased muscular endurance.  Next session anticipate patient to progress transfers, gait, and standing prolonged periods.  Physical Therapy will continue to follow patient for duration of hospitalization.    Patient continues to benefit from continued skilled PT services: at discharge to promote functional independence and safety with additional support and return home with home health PT.    PLAN  PT Treatment Plan: Energy conservation;Gait training;Bed mobility;Endurance;Strengthening;Transfer training  Rehab Potential : Fair  Frequency (Obs): 3-5x/week    CURRENT GOALS       Goal #1 Patient is able to demonstrate supine - sit EOB @ level: modified independent      Goal #2 Patient is able to demonstrate transfers EOB to/from Chair/Wheelchair at assistance level: modified independent      Goal #3 Patient is able to ambulate 50 feet with assist device: walker - rolling at assistance level: supervision      Goal #4 Pt will negotiate one flight of stairs with railing with cga   Goal #5     Goal #6       2024 all goals ongoing     SUBJECTIVE    \"I need to sit down\"     OBJECTIVE  Precautions: Bed/chair alarm    WEIGHT BEARING RESTRICTION  Weight Bearing Restriction: None                PAIN ASSESSMENT   Ratin  Location: pt denies  Management Techniques: Activity promotion;Body mechanics;Repositioning    BALANCE                                                                                                                        Static Sitting: Fair  Dynamic Sitting: Fair -           Static Standing: Fair -  Dynamic Standing: Poor +    ACTIVITY TOLERANCE                         O2 WALK         AM-PAC '6-Clicks' INPATIENT SHORT FORM - BASIC MOBILITY  How much difficulty does the patient currently have...  Patient Difficulty: Turning over in bed (including adjusting bedclothes, sheets and blankets)?: None   Patient Difficulty: Sitting down on and standing up from a chair with arms (e.g., wheelchair, bedside commode, etc.): A Little   Patient Difficulty: Moving from lying on back to sitting on the side of the bed?: A Little   How much help from another person does the patient currently need...   Help from Another: Moving to and from a bed to a chair (including a wheelchair)?: A Little   Help from Another: Need to walk in hospital room?: A Little   Help from Another: Climbing 3-5 steps with a railing?: A Lot       AM-PAC Score:  Raw Score: 18   Approx Degree of Impairment: 46.58%   Standardized Score (AM-PAC Scale): 43.63   CMS Modifier (G-Code): CK    FUNCTIONAL ABILITY STATUS  Gait Assessment   Functional Mobility/Gait Assessment  Gait Assistance: Contact guard assist  Distance (ft): 15,15,20  Assistive Device: Rolling walker  Pattern: Shuffle (Kyphotic)    Skilled Therapy Provided  RN consulted prior to session   Pt presents seated in BS chair PCT present as pt just returned from shower  Min A STS cues for hand placement - pt demos fair - standing balance c RW - mod A to don brief in standing   Pt required seated rest breaks 2/2 c/o SOB   O2 sats monitored throughout session     Pt requires increased time d/t frequent rest breaks - pt demos good insight into deficits -  Supportive family present and encouraging pt.   RN aware of results of session and pulse ox readings    Bed Mobility:  Rolling: NT   Supine<>Sit: NT   Sit<>Supine: NT     Transfer Mobility:  Sit<>Stand:  min A progressed to supervision    Stand<>Sit: CGA   Gait: CGA RW    Therapist's Comments: pt on RA throughout - desats to 90% c activity, quickly recovers to 93% c seated rest     Patient End of Session: Up in chair;Needs met;RN aware of session/findings;Call light within reach;All patient questions and concerns addressed;Family present;Alarm set    PT Session Time: 25 minutes  Gait Training: 15 minutes  Therapeutic Activity: 15 minutes  Therapeutic Exercise: 0 minutes   Neuromuscular Re-education: 0 minutes

## 2024-04-05 NOTE — OCCUPATIONAL THERAPY NOTE
OCCUPATIONAL THERAPY TREATMENT NOTE - INPATIENT     Room Number: 531/531-A  Session: 2  Number of Visits to Meet Established Goals: 2    Presenting Problem: RSV, COPD exacerbation, bronchiolitis, chronic pleural effusion with trapped lung  Prior Level of Function: Pt is now living with his son and his son's family. Pt has someone with him at all times. Pt stays in his room 24/7 except for coming down the stairs once per month when they have visitors. Pt uses a RW at home. Pt is typically mod independent with toileting. Pt has family assist as needed with showers and dressing and occasionally depends management.     ASSESSMENT   Patient demonstrates fair progress this session, goals remain in progress.    Patient continues to function below baseline with toileting, lower body dressing, static standing balance, dynamic standing balance, functional standing tolerance, and energy conservation strategies.   Contributing factors to remaining limitations include decreased functional strength, decreased endurance, and decreased muscular endurance.  Next session anticipate patient to progress toileting, lower body dressing, static standing balance, dynamic standing balance, and functional standing tolerance.  Occupational Therapy will continue to follow patient for duration of hospitalization.    Patient continues to benefit from continued skilled OT services: at discharge to promote functional independence and safety with additional support and return home with home health OT.          OT Device Recommendations: TBD    History: Patient is a 89 year old male admitted on 3/26/2024 with Presenting Problem: RSV, COPD exacerbation, bronchiolitis, chronic pleural effusion with trapped lung. Co-Morbidities : DM, A-fib, hx CVA    WEIGHT BEARING RESTRICTION  Weight Bearing Restriction: None                  TREATMENT SESSION:  Patient Start of Session: seated in chair; family present  FUNCTIONAL TRANSFER ASSESSMENT  Sit to Stand:  Chair  Chair: Contact Guard Assist    BED MOBILITY     BALANCE ASSESSMENT     FUNCTIONAL ADL ASSESSMENT  LB Dressing Standing: Maximum Assist (depends management)      ACTIVITY TOLERANCE: WFL; maintained O2 sats around  on RA with activity                         O2 SATURATIONS       EDUCATION PROVIDED  Patient: Role of Occupational Therapy; Plan of Care; Discharge Recommendations; Functional Transfer Techniques; Fall Prevention; Posture/Positioning; Energy Conservation; Proper Body Mechanics  Patient's Response to Education: Verbalized Understanding; Requires Further Education  Family/Caregiver: Role of Occupational Therapy; Plan of Care; Discharge Recommendations  Family/Caregiver's Response to Education: Verbalized Understanding      Therapist comments: Pt engages short distance functional mobility with RW in preparation for ambulatory toilet transfers. Sit to stand transfers performed at CGA and engages in functional mobility requiring CGA. Pt requires frequent rest breaks and max education on pursed lip breathing techniques to ease SOB and to help improve activity tolerance. Pt and family educated on daily mobility for improved activity tolerance and improved strength.   Patient End of Session: Up in chair;Needs met;Call light within reach;RN aware of session/findings;All patient questions and concerns addressed;Alarm set;Family present    SUBJECTIVE  \"Let me sit.\"    PAIN ASSESSMENT  Ratin  Location: denies        OBJECTIVE  Precautions: Bed/chair alarm    AM-PAC ‘6-Clicks’ Inpatient Daily Activity Short Form  -   Putting on and taking off regular lower body clothing?: A Little  -   Bathing (including washing, rinsing, drying)?: A Little  -   Toileting, which includes using toilet, bedpan or urinal? : A Little  -   Putting on and taking off regular upper body clothing?: A Little  -   Taking care of personal grooming such as brushing teeth?: A Little  -   Eating meals?: None    AM-PAC Score:  Score:  19  Approx Degree of Impairment: 42.8%  Standardized Score (AM-PAC Scale): 40.22    PLAN  OT Treatment Plan: Balance activities;Energy conservation/work simplification techniques;ADL training;Functional transfer training;Endurance training;Patient/Family education;Patient/Family training;Equipment eval/education;Compensatory technique education;Continued evaluation  Rehab Potential : Good  Frequency: Daily    OT Goals:     All goals ongoing 04/05    ADL Goals  Patient will perform toileting with supervision and AE PRN  Patient will perform LB dressing with min A and AE PRN     Functional Transfer Goals  Patient will perform bed mobility supine to sit with supervision  Patient will perform bed mobility sit to supine with supervision  Patient will perform toilet transfer with supervision    OT Session Time: 30 minutes  Therapeutic Activity: 25 minutes

## 2024-04-05 NOTE — PLAN OF CARE
Oxygen Walk results:      SPO2% on Room Air at Rest: 93 (04/05/24 1204)       SPO2% Ambulation on Room Air: 90 (04/05/24 1204)

## 2024-04-05 NOTE — PAYOR COMM NOTE
--------------  CONTINUED STAY REVIEW  4/5  Payor: [facility] POS/MCNP  Subscriber #:  [id number]  Authorization Number: [id number]    Admit date: 3/29/24  Admit time: 11:15 AM    Admitting Physician: Everardo Woo MD  Attending Physician:  Michele Chatterjee MD  Primary Care Physician: Sp Myers MD    REVIEW DOCUMENTATION:    4/5 Pulmonary    repeating swallow study today, will do O2 assessment today as well     Oxygen Therapy  SpO2: 93 %  O2 Device: None (Room air)  O2 Flow Rate (L/min): 2 L/min     sodium chloride  4 mL Nebulization Q8H DIONI    ipratropium-albuterol  3 mL Nebulization Q8H DIONI    pantoprazole  40 mg Intravenous Q24H    dilTIAZem  10 mg Intravenous Once    acetylcysteine  2 mL Nebulization BID    atorvastatin  40 mg Oral Nightly    fluticasone furoate-vilanterol  1 puff Inhalation Daily     Lungs:  slightly diminished in right base, clear on left  Heart: S1, S2 normal, no murmur, click, rub or gallop, regular rate and rhythm  Abdomen: soft, non-tender; bowel sounds normal; no masses,  no organomegaly  Extremities: edema 1+ LE shan     Labs reviewed as noted below           ASSESSMENT/PLAN:     Acute hypoxic resp failure - secondary to COPD exac triggered by RSV infection and now with secondary bacterial PNA  -weaned to RA  -has significant mucous plugging and inability to clear secretions- cont CPT, Mucomyst, suctioning  AECOPD - secondary to RSV with secondary bacterial pneumonia. May be due to aspiration given patient's previous poor swallow eval.  -prednisone, taper to 10mg today  -Breo  -BD protocol  -needs home nebulizer  Pleural effusion - chronic R effusion- unchanged  -tapped in past with trapped lung  -no indication for repeat thora  Pneumonia - likely aspiration- as above. Sputum culture with Klebsiella. MRSA swab negative  -zosyn (3/29-4/4 )  Abn CXR - there is a R lateral pleural based nodular density on the CXR that does not appear to have been present in the past  -outpatient  follow up with Dr. Jones  Dysphagia - diet per speech  - family does not wish to pursue PEG  Proph - eliquis     Latest Reference Range & Units 04/05/24 06:14   Glucose 70 - 99 mg/dL 122 (H)   Sodium 136 - 145 mmol/L 142   Potassium 3.5 - 5.1 mmol/L 3.3 (L)   Chloride 98 - 112 mmol/L 112   Carbon Dioxide, Total 21.0 - 32.0 mmol/L 25.0   BUN 9 - 23 mg/dL 39 (H)   CREATININE 0.70 - 1.30 mg/dL 1.69 (H)   CALCIUM 8.5 - 10.1 mg/dL 8.6   EGFR >=60 mL/min/1.73m2 38 (L)   ANION GAP 0 - 18 mmol/L 5   CALCULATED OSMOLALITY 275 - 295 mOsm/kg 305 (H)   WBC 4.0 - 11.0 x10(3) uL 19.9 (H)   Hemoglobin 13.0 - 17.5 g/dL 10.3 (L)   Hematocrit 39.0 - 53.0 % 31.8 (L)   Platelet Count 150.0 - 450.0 10(3)uL 263.0   RBC 3.80 - 5.80 x10(6)uL 4.00            MEDICATIONS ADMINISTERED IN LAST 1 DAY:  acetylcysteine (Mucomyst) 20 % nebulizer solution 2 mL       Date Action Dose Route User    4/5/2024 0832 Given 2 mL Nebulization Ryan Fletcher RCP    4/5/2024 0027 Given 2 mL Nebulization Mike Mac, P          apixaban (Eliquis) tab 2.5 mg       Date Action Dose Route User    4/5/2024 0817 Given 2.5 mg Oral Franchesca Levin RN    4/4/2024 2201 Given 2.5 mg Oral Emely Mckeon RN          atorvastatin (Lipitor) tab 40 mg       Date Action Dose Route User    4/4/2024 2201 Given 40 mg Oral Emely Mckeon RN          multivitamin (Centrum) chewable tab (Adult) 1 tablet       Date Action Dose Route User    4/5/2024 0817 Given 1 tablet Oral Franchesca Levin RN          fluticasone furoate-vilanterol (Breo Ellipta) 200-25 MCG/ACT inhaler 1 puff       Date Action Dose Route User    4/5/2024 0818 Given 1 puff Inhalation Franchesca Levin RN          furosemide (Lasix) tab 40 mg       Date Action Dose Route User    4/5/2024 0817 Given 40 mg Oral Franchesca Levin RN    4/4/2024 1717 Given 40 mg Oral Flory Grant RN          guaiFENesin (Mucinex) tab 400 mg       Date Action Dose Route User    4/5/2024 0817 Given 400 mg Oral Franchesca Levin RN    4/5/2024  0511 Given 400 mg Oral Emely Mckeon RN    4/4/2024 2201 Given 400 mg Oral Emely Mckeon RN    4/4/2024 1716 Given 400 mg Oral Flory Grant RN          insulin aspart (NovoLOG) 100 Units/mL FlexPen 2-10 Units       Date Action Dose Route User    4/5/2024 0511 Given 2 Units Subcutaneous (Right Lower Abdomen) Emely Mckeon RN    4/4/2024 2206 Given 10 Units Subcutaneous (Left Lower Abdomen) Emely Mckeon RN    4/4/2024 1720 Given 10 Units Subcutaneous (Right Lower Abdomen) Flory Grant RN    4/4/2024 1253 Given 2 Units Subcutaneous (Left Lower Abdomen) Flory Grant RN          insulin degludec 100 units/mL flextouch 12 Units       Date Action Dose Route User    4/4/2024 2236 Given 12 Units Subcutaneous (Left Lower Abdomen) Emely Mckeon RN          ipratropium-albuterol (Duoneb) 0.5-2.5 (3) MG/3ML inhalation solution 3 mL       Date Action Dose Route User    4/5/2024 0830 Given 3 mL Nebulization Ryan Fletcher, [facility]    4/5/2024 0027 Given 3 mL Nebulization Mike Mac, [facility]    4/4/2024 1643 Given 3 mL Nebulization Anabel Mcdermott, [facility]          levothyroxine (Synthroid) tab 50 mcg       Date Action Dose Route User    4/5/2024 0817 Given 50 mcg Oral Franchesca Levin RN          metoprolol tartrate (Lopressor) partial tab 12.5 mg       Date Action Dose Route User    4/5/2024 0511 Given 12.5 mg Oral Emely Mckeon RN    4/4/2024 1716 Given 12.5 mg Oral Flory Grant RN          pantoprazole (Protonix) injection 40 mg       Date Action Dose Route User    4/5/2024 0511 Given 40 mg Intravenous Emely Mckeon RN          piperacillin-tazobactam (Zosyn) 3.375 g in dextrose 5% 100 mL IVPB-ADDV       Date Action Dose Route User    4/4/2024 2208 New Bag 3.375 g Intravenous Emely Mckeon RN    4/4/2024 1234 New Bag 3.375 g Intravenous Flory Grant, KIESHA          potassium chloride (K-Dur) tab 40 mEq       Date Action Dose Route User    4/4/2024 1235 Given 40 mEq Oral Flory Grant, RN           predniSONE (Deltasone) tab 10 mg       Date Action Dose Route User    4/5/2024 0817 Given 10 mg Oral Franchesca Levin, RN          sodium chloride 7 % nebulizer solution 4 mL       Date Action Dose Route User    4/5/2024 0833 Given 4 mL Nebulization Ryan Fletcher, P    4/5/2024 0027 Given 4 mL Nebulization Mike Mac, P    4/4/2024 1644 Given 4 mL Nebulization Anabel Mcdermott, P          tamsulosin (Flomax) cap 0.4 mg       Date Action Dose Route User    4/4/2024 2236 Given 0.4 mg Oral Emely Mckeon, RN            Vitals (last day)       Date/Time Temp Pulse Resp BP SpO2 Weight O2 Device O2 Flow Rate (L/min) [facility]    04/05/24 1204 98.1 °F (36.7 °C) -- 18 125/46 -- -- -- --     04/05/24 0821 97.8 °F (36.6 °C) -- 18 143/43 -- -- -- --     04/05/24 0821 -- -- -- -- 93 % -- None (Room air) --     04/05/24 0500 98.2 °F (36.8 °C) 74 18 135/65 93 % -- None (Room air) -- TC    04/04/24 2355 97.4 °F (36.3 °C) 63 16 140/38 95 % -- None (Room air) --     04/04/24 2128 98.2 °F (36.8 °C) 66 17 136/49 93 % -- None (Room air) --     04/04/24 1700 98.2 °F (36.8 °C) -- 18 136/52 -- -- -- --     04/04/24 1700 -- -- -- -- -- -- None (Room air) --     04/04/24 1207 98.8 °F (37.1 °C) -- 18 117/41 -- -- -- --     04/04/24 1207 -- 73 -- -- 92 % -- None (Room air) --     04/04/24 0910 -- -- -- -- 96 % -- None (Room air) -- EI    04/04/24 0845 98 °F (36.7 °C) -- 18 -- -- -- -- -- WB    04/04/24 0845 -- 80 -- 144/37 93 % -- None (Room air) -- LR    04/04/24 0500 98.3 °F (36.8 °C) 74 18 142/36 94 % -- None (Room air) -- TC    04/04/24 0120 -- 87 22 -- 98 % -- -- -- TK    04/04/24 0110 -- 86 20 -- 92 % -- None (Room air) -- TK

## 2024-04-05 NOTE — CM/SW NOTE
Informed by RN that we are planning to discharge patient tomorrow morning.    Met with patient and two daughters at bedside. Provided POLST form for them to complete. They inquired about HC POA so printed off form and placed order for spiritual care to go over with them. Patient passed o2 walk therefore not medically appropriate to order home oxygen, they inquired on private pay cost. They also inquired on home primofit, informed not covered by can look up resources and add to AVS. Daughters state patient already has home nebulizer. Asked for HH choice, they will follow up with SW later.    Spoke with Lakshmi from [facility] to ensure hospital bed is delivered today. Provided son Clem's phone # and address. Informed her daughters said neb was not needed. Inquired on private pay home o2 cost. Await response.    CYN/BULMARO to remain available for support and/or discharge planning.    Addendum 3:30pm: Met with patient, 2 daughters, 1 son. Patient completed POLST form, will place on paper chart for MD to sign. They are completing the HC POA with patient. They would like to pay for home oxygen, informed them of $145/month cost and they were agreeable. They chose [facility], reserved in aidin.     Informed Dr. Chatterjee of POLST, he plans to sign tomorrow morning.    Spoke with Lakshmi from [facility] about private pay home oxygen. She will plan to have delivered tomorrow.     4:05pm: HC POA completed and placed on chart to be scanned into medical records.         LORI Geronimo  Discharge Planner  [phone]

## 2024-04-05 NOTE — DIETARY MALNUTRITION NOTE
[facility]   part of [facility]  NUTRITION ASSESSMENT    Pt meets moderate malnutrition criteria at this time.    CRITERIA FOR MALNUTRITION DIAGNOSIS:  Criteria for non-severe malnutrition diagnosis: chronic illness related to wt loss 10% in 6 months and muscle mass mild depletion    NUTRITION INTERVENTION:    RD nutrition Care Plan- Initiated ONS (oral nutritional supplements), Ordered multivitamin, and See RD nutrition assessment for additional recommendations  Meal and Snacks - Continue Pureed Vegetarian diet with nectar thick liquids as tolerated; monitor patient po intake. Encourage adequate po of appropriate diet.  Medical Food Supplements - Discontinue Glucerna for now per family request. Rationale/use for oral supplements discussed.  Vitamin and Mineral Supplements - Continue Chewable MVI  Coordination of Nutrition Care - Recommend SLP consult prior to diet advancement.    PATIENT STATUS: 4/5: Visited pt at bedside with family members present. Family reports pt having better appetite but still stating he is not hungry. He has been drinking some of his Glucerna shakes with thickening packet but has multiple unopened bottles at bedside. SLP continues to recommend pureed solids and nectar thick liquids but family requesting another VFSS as they feel pt doing better. Continued to encourage PO intake; all questions answered at this time.    4/2: 89 year old male admitted on 3/26 presents with COPD exacerbation. Pt screened d/t low BMI, consult for \"Poor po intake/appetite, supplement recs, diabetic\", and elevated A1c 9.5%. Visited pt at bedside with family member present who provided hx. She reports pt had a pretty good appetite PTA but has been decreased during admit mostly d/t weakness. Reports didn't eat anything yesterday but had some liquids today for bfast. SLP performed VFSS yesterday and recommends pureed solids and nectar thick liquids. Denies nausea, vomiting and diarrhea but has chronic  constipation; last BM yesterday. NKFA but follows vegetarian diet (no egg/fish). Family reports pt's wt ~6 mo ago was around 153 lbs and currently weighs 137 lbs. Discussed ONS options and family requesting vanilla Glucerna. Discussed this would have to be thickened by RN or family which they are agreeable. Continued to encourage PO intake; all questions answered at this time.    PMH: Arthritis of both knees, Asthma, Diabetes, High cholesterol, HTN, Hydropneumothorax, Hyperlipidemia, Hypothyroidism, Ischemic stroke, LVH, Osteoarthritis.    ANTHROPOMETRICS:  Ht: 170.2 cm (5' 7\")  Wt: 62.1 kg (137 lb).   BMI: Body mass index is 21.46 kg/m².  IBW: 67.3 kg    WEIGHT HISTORY: Family reports ~16 lb wt loss x 6 months (10%, significant per standards).  Patient Weight(s) for the past 336 hrs:   Weight   03/26/24 1356 62.1 kg (137 lb)   03/26/24 0917 62.1 kg (137 lb)       Wt Readings from Last 10 Encounters:   03/26/24 62.1 kg (137 lb)   05/23/22 60.3 kg (133 lb)   02/23/22 60.4 kg (133 lb 2.5 oz)   02/17/22 62.1 kg (136 lb 14.5 oz)   02/10/22 62.6 kg (138 lb)   01/26/22 69.6 kg (153 lb 6.4 oz)   05/13/21 70.8 kg (156 lb)   03/31/21 67.6 kg (149 lb)   03/31/21 67.6 kg (149 lb 0.5 oz)   02/05/21 72.6 kg (160 lb)        NUTRITION:  Diet:       Procedures    Regular/General diet Fluid Consistency: Nectar Thick / Mildly Thick Liquids; Texture Consistency: Pureed; Is Patient on Accuchecks? Yes; Misc Restriction: Vegetarian        Percent Meals Eaten (last 3 days)       Date/Time Percent Meals Eaten (%)    04/05/24 0900 75 %            Food Allergies: No  Cultural/Ethnic/Sikh Preferences Addressed: Yes    GI SYSTEM REVIEW: swallowing dysfunction; last BM 4/3  Skin/Wounds: WNL    NUTRITION RELATED PHYSICAL FINDINGS:     1. Body Fat/Muscle Mass: mild muscle depletion Temple region and Clavicle region     2. Fluid Accumulation: lower extremity edema     NUTRITION PRESCRIPTION: 62.1 kg  Calories: 9670-7143 calories/day (25-30  kcal/kg)  Protein: 62-93 grams protein/day (1.0-1.5 gm/kg)  Fluid: ~1 ml/kcal or per MD discretion    NUTRITION DIAGNOSIS/PROBLEM:  Malnutrition related to insufficient appetite resulting in inadequate nutrition intake as evidenced by documented/reported unintentional weight loss and loss of muscle mass    MONITOR AND EVALUATE/NUTRITION GOALS:  PO intake of 75% of meals TID - Not met, Continues  PO intake of 75% of oral nutrition supplement/s - Not met, Continues  Weight stable within 1 to 2 lbs during admission - Ongoing      MEDICATIONS:  Lasix, Novolog, degludec 12 units nightly, synthroid, multivitamin, protonix, prednisone, flomax    LABS:  , K+ 3.3, POC glucose x 24hrs: 157-339 mg/dl, A1c = 9.5%    Pt is at Moderate nutrition risk    Jeannine Matthew RD, LDN, [facility]  Clinical Dietitian  Spectra: [phone]

## 2024-04-06 ENCOUNTER — APPOINTMENT (OUTPATIENT)
Dept: GENERAL RADIOLOGY | Facility: HOSPITAL | Age: 89
End: 2024-04-06
Attending: INTERNAL MEDICINE
Payer: COMMERCIAL

## 2024-04-06 VITALS
HEIGHT: 67 IN | HEART RATE: 72 BPM | TEMPERATURE: 98 F | OXYGEN SATURATION: 95 % | DIASTOLIC BLOOD PRESSURE: 40 MMHG | RESPIRATION RATE: 17 BRPM | BODY MASS INDEX: 19.82 KG/M2 | WEIGHT: 126.31 LBS | SYSTOLIC BLOOD PRESSURE: 135 MMHG

## 2024-04-06 LAB
ALBUMIN SERPL-MCNC: 2 G/DL (ref 3.4–5)
ANION GAP SERPL CALC-SCNC: 7 MMOL/L (ref 0–18)
BUN BLD-MCNC: 36 MG/DL (ref 9–23)
CALCIUM BLD-MCNC: 8.8 MG/DL (ref 8.5–10.1)
CHLORIDE SERPL-SCNC: 109 MMOL/L (ref 98–112)
CO2 SERPL-SCNC: 25 MMOL/L (ref 21–32)
CREAT BLD-MCNC: 1.71 MG/DL
EGFRCR SERPLBLD CKD-EPI 2021: 37 ML/MIN/1.73M2 (ref 60–?)
GLUCOSE BLD-MCNC: 134 MG/DL (ref 70–99)
GLUCOSE BLD-MCNC: 206 MG/DL (ref 70–99)
OSMOLALITY SERPL CALC.SUM OF ELEC: 306 MOSM/KG (ref 275–295)
PHOSPHATE SERPL-MCNC: 2.5 MG/DL (ref 2.5–4.9)
POTASSIUM SERPL-SCNC: 3.6 MMOL/L (ref 3.5–5.1)
SODIUM SERPL-SCNC: 141 MMOL/L (ref 136–145)

## 2024-04-06 PROCEDURE — 92611 MOTION FLUOROSCOPY/SWALLOW: CPT

## 2024-04-06 PROCEDURE — 92526 ORAL FUNCTION THERAPY: CPT

## 2024-04-06 PROCEDURE — 80069 RENAL FUNCTION PANEL: CPT | Performed by: INTERNAL MEDICINE

## 2024-04-06 PROCEDURE — C9113 INJ PANTOPRAZOLE SODIUM, VIA: HCPCS | Performed by: HOSPITALIST

## 2024-04-06 PROCEDURE — 74230 X-RAY XM SWLNG FUNCJ C+: CPT | Performed by: INTERNAL MEDICINE

## 2024-04-06 PROCEDURE — 82962 GLUCOSE BLOOD TEST: CPT

## 2024-04-06 RX ORDER — BENZONATATE 200 MG/1
200 CAPSULE ORAL 3 TIMES DAILY PRN
Qty: 90 CAPSULE | Refills: 0 | Status: SHIPPED | OUTPATIENT
Start: 2024-04-06 | End: 2024-05-06

## 2024-04-06 RX ORDER — IPRATROPIUM BROMIDE AND ALBUTEROL SULFATE 2.5; .5 MG/3ML; MG/3ML
3 SOLUTION RESPIRATORY (INHALATION) 2 TIMES DAILY
Qty: 360 ML | Refills: 0 | Status: SHIPPED | OUTPATIENT
Start: 2024-04-06 | End: 2024-06-05

## 2024-04-06 RX ORDER — OMEPRAZOLE 40 MG/1
40 CAPSULE, DELAYED RELEASE ORAL DAILY
Qty: 30 CAPSULE | Refills: 3 | Status: SHIPPED | OUTPATIENT
Start: 2024-04-06

## 2024-04-06 RX ORDER — FUROSEMIDE 40 MG/1
40 TABLET ORAL 2 TIMES DAILY
Qty: 60 TABLET | Refills: 0 | Status: SHIPPED | OUTPATIENT
Start: 2024-04-06

## 2024-04-06 RX ORDER — LOSARTAN POTASSIUM 100 MG/1
100 TABLET ORAL DAILY
Qty: 30 TABLET | Refills: 2 | Status: SHIPPED | OUTPATIENT
Start: 2024-04-06

## 2024-04-06 RX ORDER — BLOOD SUGAR DIAGNOSTIC
STRIP MISCELLANEOUS
Qty: 100 STRIP | Refills: 6 | Status: SHIPPED | OUTPATIENT
Start: 2024-04-06

## 2024-04-06 RX ORDER — BLOOD-GLUCOSE METER
EACH MISCELLANEOUS
Qty: 1 KIT | Refills: 0 | Status: SHIPPED | OUTPATIENT
Start: 2024-04-06

## 2024-04-06 RX ORDER — BENZONATATE 200 MG/1
200 CAPSULE ORAL 3 TIMES DAILY PRN
Qty: 90 CAPSULE | Refills: 0 | Status: SHIPPED | OUTPATIENT
Start: 2024-04-06 | End: 2024-04-06

## 2024-04-06 RX ORDER — ATORVASTATIN CALCIUM 40 MG/1
40 TABLET, FILM COATED ORAL NIGHTLY
Qty: 30 TABLET | Refills: 0 | Status: SHIPPED | OUTPATIENT
Start: 2024-04-06

## 2024-04-06 RX ORDER — GUAIFENESIN 400 MG/1
400 TABLET ORAL
Qty: 90 TABLET | Refills: 0 | Status: SHIPPED | OUTPATIENT
Start: 2024-04-06

## 2024-04-06 RX ORDER — TAMSULOSIN HYDROCHLORIDE 0.4 MG/1
0.4 CAPSULE ORAL DAILY
Qty: 30 CAPSULE | Refills: 0 | Status: SHIPPED | OUTPATIENT
Start: 2024-04-06

## 2024-04-06 RX ORDER — PREDNISONE 10 MG/1
10 TABLET ORAL
Qty: 1 TABLET | Refills: 0 | Status: SHIPPED | OUTPATIENT
Start: 2024-04-07 | End: 2024-04-08

## 2024-04-06 RX ORDER — PEN NEEDLE, DIABETIC 32GX 5/32"
NEEDLE, DISPOSABLE MISCELLANEOUS
Qty: 100 EACH | Refills: 6 | Status: SHIPPED | OUTPATIENT
Start: 2024-04-06

## 2024-04-06 RX ORDER — GUAIFENESIN 400 MG/1
400 TABLET ORAL
Qty: 90 TABLET | Refills: 0 | Status: SHIPPED | OUTPATIENT
Start: 2024-04-06 | End: 2024-04-06

## 2024-04-06 RX ORDER — SODIUM CHLORIDE FOR INHALATION 7 %
4 VIAL, NEBULIZER (ML) INHALATION 2 TIMES DAILY
Qty: 480 ML | Refills: 0 | Status: SHIPPED | OUTPATIENT
Start: 2024-04-06 | End: 2024-06-05

## 2024-04-06 RX ORDER — POTASSIUM CHLORIDE 1500 MG/1
1 TABLET, EXTENDED RELEASE ORAL
Qty: 30 TABLET | Refills: 0 | Status: SHIPPED | OUTPATIENT
Start: 2024-04-06

## 2024-04-06 RX ORDER — LANCETS 33 GAUGE
EACH MISCELLANEOUS
Qty: 100 EACH | Refills: 6 | Status: SHIPPED | OUTPATIENT
Start: 2024-04-06

## 2024-04-06 RX ORDER — INSULIN DETEMIR 100 [IU]/ML
12 INJECTION, SOLUTION SUBCUTANEOUS NIGHTLY
Qty: 5 EACH | Refills: 3 | Status: SHIPPED | OUTPATIENT
Start: 2024-04-06 | End: 2024-04-08

## 2024-04-06 RX ORDER — LEVOTHYROXINE SODIUM 0.05 MG/1
50 TABLET ORAL DAILY
Qty: 30 TABLET | Refills: 0 | Status: SHIPPED | OUTPATIENT
Start: 2024-04-06

## 2024-04-06 RX ORDER — ACETYLCYSTEINE 200 MG/ML
2 SOLUTION ORAL; RESPIRATORY (INHALATION) 2 TIMES DAILY
Qty: 240 ML | Refills: 0 | Status: SHIPPED | OUTPATIENT
Start: 2024-04-06 | End: 2024-06-05

## 2024-04-06 NOTE — CM/SW NOTE
CM called [facility] to confirm home oxygen for discharge; CM informed by on call  oxygen is scheduled for delivery today, delivery time unable to be provided.  RN updated.      [facility] updated on discharge status, will send AVS via [employer] system when available in Epic.     Angelita Carter RN Case Manager [phone]

## 2024-04-06 NOTE — PROGRESS NOTES
NURSING DISCHARGE NOTE    Discharged Home via Wheelchair.  Accompanied by Family member  Belongings Taken by patient/family  Discharge instructions given. IV's removed. .

## 2024-04-06 NOTE — PROGRESS NOTES
[facility]   part of [facility]    Nephrology Progress Note    Hector Nguyen Attending:  Michele Chatterjee MD       SUBJECTIVE:     Pt denies any complaints  Per family at bedside his appetite has overall improved; no issues with urination.  Has leg swelling but is taking lasix.    PHYSICAL EXAM:     Vital Signs: /40 (BP Location: Left arm)   Pulse 72   Temp 98 °F (36.7 °C) (Oral)   Resp 17   Ht 170.2 cm (5' 7\")   Wt 126 lb 5.2 oz (57.3 kg)   SpO2 95%   BMI 19.79 kg/m²   Temp (24hrs), Av.1 °F (36.7 °C), Min:98 °F (36.7 °C), Max:98.3 °F (36.8 °C)       Intake/Output Summary (Last 24 hours) at 2024 1315  Last data filed at 2024 0500  Gross per 24 hour   Intake --   Output 1500 ml   Net -1500 ml     Wt Readings from Last 3 Encounters:   24 126 lb 5.2 oz (57.3 kg)   22 133 lb (60.3 kg)   22 133 lb 2.5 oz (60.4 kg)       General: pleasant, thin, nad  Skin: no visible rashes  HEENT: NCAT  Cardiac: Regular rate and rhythm, no murmur/gallop or rub  Lungs: CTAB, no wheeze, no rale, no rhonchi  Abdomen: Soft, NTND  Extremities: warm, well perfused, 1+ edema  Neurologic/Psych: mentating well       LABORATORY DATA:     Lab Results   Component Value Date     (H) 2024    BUN 36 (H) 2024    BUNCREA 18.0 02/10/2022    CREATSERUM 1.71 (H) 2024    ANIONGAP 7 2024    GFRNAA 39 (L) 2022    GFRAA 45 (L) 2022    CA 8.8 2024    OSMOCALC 306 (H) 2024    ALKPHO 109 2024    AST 15 2024    ALT 35 2024    BILT 0.9 2024    TP 6.7 2024    ALB 2.0 (L) 2024    GLOBULIN 4.6 (H) 2024     2024    K 3.6 2024     2024    CO2 25.0 2024     Lab Results   Component Value Date    WBC 19.9 (H) 2024    RBC 4.00 2024    HGB 10.3 (L) 2024    HCT 31.8 (L) 2024    .0 2024    MCV 79.5 (L) 2024    MCH 25.8 (L) 2024    MCHC  32.4 04/05/2024    RDW 16.1 04/05/2024    NEPRELIM 14.87 (H) 03/30/2024    NEUTABS 15.00 (H) 03/30/2024    LYMPHABS 0.65 (L) 03/30/2024    EOSABS 0.33 03/30/2024    BASABS 0.00 03/30/2024    NEUT 48 03/30/2024    LYMPH 4 03/30/2024    MON 2 03/30/2024    EOS 2 03/30/2024    BASO 0 03/30/2024    NEPERCENT 87.9 03/28/2024    LYPERCENT 1.9 03/28/2024    MOPERCENT 9.7 03/28/2024    EOPERCENT 0.0 03/28/2024    BAPERCENT 0.1 03/28/2024    NE 12.81 (H) 03/28/2024    LYMABS 0.27 (L) 03/28/2024    MOABSO 1.41 (H) 03/28/2024    EOABSO 0.00 03/28/2024    BAABSO 0.02 03/28/2024     Lab Results   Component Value Date    MALBP 13.3 01/26/2022    CREUR 81.40 03/30/2024     Lab Results   Component Value Date    COLORUR Yellow 02/22/2022    CLARITY Clear 02/22/2022    SPECGRAVITY 1.015 02/22/2022    GLUUR >=500 (A) 02/22/2022    BILUR Negative 02/22/2022    KETUR 20 (A) 02/22/2022    BLOODURINE Moderate (A) 02/22/2022    PHURINE 7.0 02/22/2022    PROUR 30 (A) 02/22/2022    UROBILINOGEN <2.0 02/22/2022    NITRITE Negative 02/22/2022    LEUUR Negative 02/22/2022    WBCUR 1-5 02/22/2022    RBCUR 0-2 02/22/2022    EPIUR None Seen 02/22/2022    BACUR None Seen 02/22/2022         IMAGING:     Reviewed.    MEDICATIONS:       Current Facility-Administered Medications   Medication Dose Route Frequency    insulin degludec 100 units/mL flextouch 12 Units  12 Units Subcutaneous Nightly    predniSONE (Deltasone) tab 10 mg  10 mg Oral Daily with breakfast    furosemide (Lasix) tab 40 mg  40 mg Oral BID (Diuretic)    albuterol (Ventolin HFA) 108 (90 Base) MCG/ACT inhaler 2 puff  2 puff Inhalation Q6H PRN    lactulose (CHRONULAC) 10 GM/15ML solution 20 g  20 g Oral Daily PRN    polyethylene glycol (PEG 3350) (Miralax) 17 g oral packet 17 g  17 g Oral Daily PRN    apixaban (Eliquis) tab 2.5 mg  2.5 mg Oral BID    guaiFENesin (Mucinex) tab 400 mg  400 mg Oral 6 times per day    insulin aspart (NovoLOG) 100 Units/mL FlexPen 2-10 Units  2-10 Units  Subcutaneous TID AC and HS    multivitamin (Centrum) chewable tab (Adult) 1 tablet  1 tablet Oral Daily    sodium chloride 7 % nebulizer solution 4 mL  4 mL Nebulization Q8H DIONI    ipratropium-albuterol (Duoneb) 0.5-2.5 (3) MG/3ML inhalation solution 3 mL  3 mL Nebulization Q8H DIONI    sodium chloride (Saline Mist) 0.65 % nasal solution 1 spray  1 spray Each Nare Q3H PRN    glycerin-hypromellose- (Artifical Tears) 0.2-0.2-1 % ophthalmic solution 1 drop  1 drop Both Eyes QID PRN    pantoprazole (Protonix) injection 40 mg  40 mg Intravenous Q24H    dilTIAZem (cardIZEM) 25 mg/5mL injection 10 mg  10 mg Intravenous Once    acetylcysteine (Mucomyst) 20 % nebulizer solution 2 mL  2 mL Nebulization BID    sennosides (Senokot) tab 8.6 mg  8.6 mg Oral Daily PRN    bisacodyl (Dulcolax) 10 MG rectal suppository 10 mg  10 mg Rectal Daily PRN    glucose (Dex4) 15 GM/59ML oral liquid 15 g  15 g Oral Q15 Min PRN    Or    glucose (Glutose) 40% oral gel 15 g  15 g Oral Q15 Min PRN    Or    glucose-vitamin C (Dex-4) chewable tab 4 tablet  4 tablet Oral Q15 Min PRN    Or    dextrose 50% injection 50 mL  50 mL Intravenous Q15 Min PRN    Or    glucose (Dex4) 15 GM/59ML oral liquid 30 g  30 g Oral Q15 Min PRN    Or    glucose (Glutose) 40% oral gel 30 g  30 g Oral Q15 Min PRN    Or    glucose-vitamin C (Dex-4) chewable tab 8 tablet  8 tablet Oral Q15 Min PRN    acetaminophen (Tylenol) rectal suppository 650 mg  650 mg Rectal Q6H PRN    alum-mag hydroxide-simethicone (Maalox) 200-200-20 MG/5ML oral suspension 30 mL  30 mL Oral QID PRN    atorvastatin (Lipitor) tab 40 mg  40 mg Oral Nightly    fluticasone furoate-vilanterol (Breo Ellipta) 200-25 MCG/ACT inhaler 1 puff  1 puff Inhalation Daily    levothyroxine (Synthroid) tab 50 mcg  50 mcg Oral Daily    metoprolol tartrate (Lopressor) partial tab 12.5 mg  12.5 mg Oral 2x Daily(Beta Blocker)    acetaminophen (Tylenol Extra Strength) tab 500 mg  500 mg Oral Q4H PRN    melatonin tab 3  mg  3 mg Oral Nightly PRN    ondansetron (Zofran) 4 MG/2ML injection 4 mg  4 mg Intravenous Q6H PRN    metoclopramide (Reglan) 5 mg/mL injection 5 mg  5 mg Intravenous Q8H PRN    benzonatate (Tessalon) cap 200 mg  200 mg Oral TID PRN    ipratropium-albuterol (Duoneb) 0.5-2.5 (3) MG/3ML inhalation solution 3 mL  3 mL Nebulization Q4H PRN    benzocaine-menthol (Cepacol) lozenge 1 lozenge  1 lozenge Oral PRN    tamsulosin (Flomax) cap 0.4 mg  0.4 mg Oral Nightly    acetaminophen (Tylenol) tab 325-650 mg  325-650 mg Oral Q6H PRN    benzocaine-menthol (Cepacol) lozenge 1 lozenge  1 lozenge Oral PRN       ASSESSMENT/PLAN:     QUINTON on CKD 3: baseline Cr ~1.5-1.7 mg/dl  -- pre-renal, s/p IV fluids, stable now on home diuretics  -- ok to restart home losartan if needed for HTN  -- avoid NSAIDs    Hypernatremia:  -- s/p D5W  -- maintain hydration, monitor on diuretics    Anemia with iron deficiency:  -- s/p IV iron  -- no indication for TERI, Hb > 10    HTN, dCHF:  -- ok to resume home regimen    COPD with RSV bronchuitis:  -- per primary/pulmonary    Ok to discharge from nephrology standpoint.      Thank you for allowing me to participate in the care of this patient. Please do not hesitate to call with questions or concerns.        Kelly Talbot MD  [facility]ph[facility]  [address] [place] [id number]    4/6/2024  1:15 PM

## 2024-04-06 NOTE — PROGRESS NOTES
O2 walk completed. Pt walked from bathroom to chair in room and stated that he didn't want to go any further. O2 on RA 95%.

## 2024-04-06 NOTE — PLAN OF CARE
Pt is A&O x4, Romansh speaking family present. RA, tele afib, VSS. up with assist. Video swallow eval completed, POC discussed.

## 2024-04-06 NOTE — PROGRESS NOTES
AO x4. Lithuanian speaking. Family at bedside to help translate. JANICE. Gypsy. Tele - Afib. PO Lasix. DW. Eliquis. Primofit. Incontinent. Briefed. No pain reported. Up x2 walker. QID accuchecks. IV SL. Nectar thick pureed diet. Small bite sized pieces. Plan for video swallow and O2 walk tomorrow. Possible discharge. Family updated on POC. No further needs at this moment. Call light within reach.

## 2024-04-06 NOTE — DISCHARGE SUMMARY
General Medicine Discharge Summary     Patient ID:  Hector Nguyen  89 year old  12/8/1932    Admit date: 3/26/2024    Discharge date and time:4/6/2024    Attending Physician: Michele Chatterjee MD     Primary Care Physician: Sp Myers MD     Discharge Diagnoses: Acute bronchiolitis due to respiratory syncytial virus (RSV) [J21.0]  Acute COPD exacerbation (HCC) [J44.1]  Pleural effusion on right [J90]  Aspiration  Pna  Acute hypoxemic resp failure  Sepsis  Dm2 w steroid induced hyperglycemia  QUINTON on CKD4  Chronic anemia  Dysphagia  pafib        Primary Diagnosis at discharge from Hospital: Pneumonia    Please note that only IHP DMG and EMG patients enrolled in the Medicare ACO, [facility] ACO and [facility] HMOs will be handled by the [facility] Care Management team.  For all other patients, please follow usual protocol for discharge care transition.    Secondary Diagnoses: None    Risk of readmission: Hector Nguyen has Moderate Risk of readmission after discharge from the hospital.    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     Acute hypoxic respiratory failure   Acute COPD exacerbation 2/2 RSV Bronchitis  Increase R lung infiltrate, likely secondary bacterial PNA, possible aspiration pna  - PO steroids  -- weaned to RA, family would like comfort 02 for home  - started zosyn 3/29- to complete 7 d course  - scheduled  nebuilzers, add hypertonic saline   - breo, BD protocol  - supportive care  - pulm following      Sepsis from above- resolved  - treat PNA, sp sozsyn   - plan as above      DM2 w steroid induced hyperglycemia  - BG reviewed:  with little po and reducing steroid dosing will decrease degludec to 12 u   - may need insulin on dc while on steroids- dw family.  Education provided.    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  - stable     QUINTON on CKD stage 4- resolved  - BLE edema  - IVF, renal consult appreciated   - restart arb, po lasix     Anemia, chronic  - likely from CKD, slight downtrend noted  - venofer      PAF  - cont metoprool and eliquis      Dysphagia  -passed video: puree w nectar thcik liquids  - ensure     Essential HTN  - metoprolol, losartan now resumed     Hyperlipidemia  - statin     Hx CVA  - asa, statin     Hypothyroidism   - sytnhroid      Constipation  - trial lactulose, bowel regimen           Consults: IP CONSULT TO PULMONOLOGY  IP CONSULT TO PULMONOLOGY  IP CONSULT TO SOCIAL WORK  IP CONSULT TO NEPHROLOGY  IP CONSULT TO SOCIAL WORK  IP CONSULT TO FOOD AND NUTRITION SERVICES  IP CONSULT TO SOCIAL WORK  IP CONSULT TO SOCIAL WORK  IP CONSULT TO SPIRITUAL CARE  IP CONSULT TO SOCIAL WORK    Operative Procedures:        Patient instructions:      Current Discharge Medication List        START taking these medications    Details   benzonatate 200 MG Oral Cap Take 1 capsule (200 mg total) by mouth 3 (three) times daily as needed for cough.      guaiFENesin 400 MG Oral Tab Take 1 tablet (400 mg total) by mouth every 4 (four) hours.      insulin detemir (LEVEMIR FLEXTOUCH) 100 UNIT/ML Subcutaneous Solution Pen-injector Inject 12 Units into the skin nightly for 2 days.      Blood Glucose Monitoring Suppl (ONETOUCH VERIO FLEX SYSTEM) w/Device Does not apply Kit Check BG twice daily      Glucose Blood (ONETOUCH VERIO) In Vitro Strip Check BG twice daily      OneTouch Delica Lancets  33G Does not apply Misc Use as directed      Insulin Pen Needle (BD PEN NEEDLE COLLIN U/F) 32G X 4 MM Does not apply Misc Use a new pen needle with each injection as directed by your doctor           CONTINUE these medications which have NOT CHANGED    Details   acetaminophen 325 MG Oral Tab Take 1-2 tablets (325-650 mg total) by mouth every 6 (six) hours as needed for Pain.      furosemide 40 MG Oral Tab Take 1 tablet (40 mg total) by mouth 2 (two) times daily.      apixaban 2.5 MG Oral Tab Take 1 tablet (2.5 mg total) by mouth 2 (two) times daily.      tamsulosin 0.4 MG Oral Cap Take 1 capsule (0.4 mg total) by mouth daily.      BREO ELLIPTA 200-25 MCG/INH Inhalation Aerosol Powder, Breath Activated Inhale 1 puff into the lungs daily. Use one puff daily.      atorvastatin 40 MG Oral Tab Take 1 tablet (40 mg total) by mouth nightly.      metoprolol tartrate 25 MG Oral Tab Take 0.5 tablets (12.5 mg total) by mouth 2x Daily(Beta Blocker).      levothyroxine 50 MCG Oral Tab Take 1 tablet (50 mcg total) by mouth daily.      Potassium Chloride ER 20 MEQ Oral Tab CR TAKE 1 TABLET BY MOUTH DAILY WHEN USING LASIX      Omeprazole 40 MG Oral Capsule Delayed Release Take 1 capsule (40 mg total) by mouth daily. Before meal      losartan 100 MG Oral Tab Take 1 tablet (100 mg total) by mouth daily.             I PERSONALLY RECONCILED CURRENT AND DISCHARGE MEDICATIONS ON DAY OF DISCHARGE      Activity: activity as tolerated  Diet: cardiac diet and diabetic diet  Wound Care: as directed  Code Status: DNAR/Selective Treatment      Exam on day of discharge:     Vitals:    04/06/24 0758   BP: 135/40   Pulse: 72   Resp: 17   Temp: 98 °F (36.7 °C)       General: no acute distress, alert and oriented x 3  Heart: RRR  Lungs: clear bilaterally, no active wheezing  Abdomen: nontender, nondistended, intact BS  Extremities: no pedal edema   Neuro: CN inact, no focal deficits      Total time coordinating care for discharge: Greater than 30  minutes    .Estiven Chatterjee  [facility] Hospitalist  [phone]

## 2024-04-06 NOTE — VIDEO SWALLOW STUDY NOTE
ADULT VIDEOFLUOROSCOPIC SWALLOWING STUDY    Admission Date: 3/26/2024  Evaluation Date: 04/06/24  Radiologist: Sravanthi    RECOMMENDATIONS   Diet Recommendations - Solids: Soft/ Easy to chew  Diet Recommendations - Liquids: Thin Liquids    Further Follow-up:  Follow Up Needed (Documentation Required): Yes  SLP Follow-up Date: 04/08/24  Compensatory Strategies Recommended: Slow rate;Small bites and sips;Extra sauce/gravy  Aspiration Precautions: Upright position;Slow rate;Small bites and sips  Medication Administration Recommendations: One pill at a time  Treatment Plan/Recommendations: Dysphagia therapy    HISTORY   Background/Objective Information:    Problem List  Principal Problem:    COPD exacerbation (HCC)  Active Problems:    Pleural effusion on right    Acute bronchiolitis due to respiratory syncytial virus (RSV)      Past Medical History  Past Medical History:   Diagnosis Date    Abnormal CXR 3/30/2015    Arthritis of both knees 2/27/2015    Advanced, xrays 2013    Asthma (HCC)     Diabetes (HCC)     Diabetes mellitus (HCC) 2/27/2015    Disorder of thyroid     High blood pressure     High cholesterol     HTN (hypertension), benign 2/27/2015    Hydropneumothorax 2/17/2022    Hyperlipidemia 2/27/2015    Hypothyroidism due to acquired atrophy of thyroid 4/6/2015    Ischemic stroke (HCC)     LVH (left ventricular hypertrophy) 2/27/2015    Osteoarthritis     Shortness of breath     Solitary pulmonary nodule on lung CT 5/1/2015    A 3.5 mm nodular opacity in the caudal and posterior lateral corner of the posterior segment of the right upper lobe (image 113 of series 3) represents a nonspecific finding.       Current Diet Consistency: Puree;Nectar thick liquids/ Mildly thick  Prior Level of Function: Independent     History of Recent: Difficulty breathing  Precautions: Aspiration  Imaging results: NA    Reason for Referral: R/O aspiration    Family/Patient Goals:  To drink coffee     ASSESSMENT   DYSPHAGIA  ASSESSMENT  Test completed in conjunction with Radiologist.  Patient Positioned: Upright;[facility] chair.  Patient Viewed: Lateral.  Patient Alertness: Fully alert.  Consistencies Presented: Thin liquids;Soft solid;Puree to assess oropharyngeal swallow function and assess for compensatory strategies to improve safe swallow function.    THIN LIQUIDS  Method of Presentation: Cup  Oral Phase of Swallow (VFSS - Thin Liquids): Within Functional Limits  Triggered at: Base of tongue  Residue Severity, Location: Mild  Cleared/Reduced with: Secondary swallow  Laryngeal Penetration: During the swallow (transient)  Tracheal Aspiration: None  Cough/Throat Clear Response: No  Cough/Throat Clear Effective:  (NA)  Strategy(ies) Implemented (Thin Liquids): Slow rate;Small bites and sips  Effectiveness: Yes       PUREE  Oral Phase of Swallow (VFSS - Puree): Within Functional Limits  Triggered at: Base of tongue  Residue Severity, Location: Moderate;Valleculae;Pyriform sinuses  Cleared/Reduced with: Multiple swallows;Liquid assist  Laryngeal Penetration: During the swallow (suspect residue from thin liquid trial)  Tracheal Aspiration: None  Cough/Throat Clear Response: Yes  SOFT SOLID  Oral Phase of Swallow (VFSS - Soft Solid): Impaired  Mastication (VFSS - Soft Solid): Impaired  Triggered at: Base of tongue  Residue Severity, Location: Moderate;Valleculae;Pyriform sinuses  Cleared/Reduced with: Multiple swallows;Liquid assist  Laryngeal Penetration: None  Tracheal Aspiration: None     Penetration Aspiration Scale Score: Score 2: Material enters the airway, remains above the vocal folds, and is ejected from the airway       Overall Impression: Pt demonstrated reduced hyolaryngeal elevation and excursion, base of tongue retraction with reduced clearance of thicker boluses. Double swallow and liquid wash effective in clearing residue. No aspiration demonstrated with any consistency. Penetration transient with thin and nectar, residue did not  remain in laryngeal vestibule. Given improvements in medical status, recommend proceed with thin liquids and soft solid diet with utilization of compensatory strategies/swallow precaution.              GOALS  Goal #1 The patient will tolerate soft solid consistency and thin liquids without overt signs or symptoms of aspiration with 100 % accuracy over 1-2 session(s). New Goal   Goal #2 The patient/family/caregiver will demonstrate understanding and implementation of aspiration precautions and swallow strategies independently over 1-2 session(s).    In Progress     PLAN: Continue with therapy for education    EDUCATION/INSTRUCTION  Reviewed results and recommendations with patient/family/caregiver.  Agreement/Understanding verbalized and all questions answered to their apparent satisfaction. Daughter in law is internist, educated at length on results/recommendations including pillars of pneumonia and how to reduce aspiration pneumonia development risk; daughter in law verbalized understanding.    INTERDISCIPLINARY COMMUNICATION  Reviewed results with Radiologist; agreement verbalized.    Patient Experiencing Pain: No          FOLLOW UP  Treatment Plan/Recommendations: Dysphagia therapy  Number of Visits to Meet Established Goals: 1      Thank you for your referral.   If you have any questions, please contact Madonna Calabrese, JULIETA

## 2024-04-06 NOTE — PROGRESS NOTES
Pulmonary Progress Note        NAME: Hector Nguyen - ROOM: [address] [place] [address] - MRN: [id number] - Age: 89 year old - : 1932        Last 24hrs: No events overnight, repeating swallow study today, passed O2 walk yesterday and today    OBJECTIVE:  Vitals:    24 2342 24 0430 24 0758   BP: 121/54 141/51 128/86 135/40   BP Location: Right arm Left arm  Left arm   Pulse:  60 70 72   Resp: 18 17  17   Temp: 98 °F (36.7 °C) 98.3 °F (36.8 °C)  98 °F (36.7 °C)   TempSrc: Oral Oral  Oral   SpO2:  95% 95% 95%   Weight:       Height:           Oxygen Therapy  SpO2: 95 %  O2 Device: None (Room air)  O2 Flow Rate (L/min): 2 L/min  Pulse Oximetry Type: Continuous  Oximetry Probe Site Changed: No  Pulse Ox Probe Location: Right hand                  Intake/Output Summary (Last 24 hours) at 2024 0859  Last data filed at 2024 0500  Gross per 24 hour   Intake 240 ml   Output 1500 ml   Net -1260 ml       Scheduled Medication:   insulin degludec  12 Units Subcutaneous Nightly    predniSONE  10 mg Oral Daily with breakfast    furosemide  40 mg Oral BID (Diuretic)    apixaban  2.5 mg Oral BID    guaiFENesin  400 mg Oral 6 times per day    insulin aspart  2-10 Units Subcutaneous TID AC and HS    multivitamin  1 tablet Oral Daily    sodium chloride  4 mL Nebulization Q8H IDONI    ipratropium-albuterol  3 mL Nebulization Q8H DIONI    pantoprazole  40 mg Intravenous Q24H    dilTIAZem  10 mg Intravenous Once    acetylcysteine  2 mL Nebulization BID    atorvastatin  40 mg Oral Nightly    fluticasone furoate-vilanterol  1 puff Inhalation Daily    levothyroxine  50 mcg Oral Daily    metoprolol tartrate  12.5 mg Oral 2x Daily(Beta Blocker)    tamsulosin  0.4 mg Oral Nightly     Continuous Infusing Medication:    Lungs:  slightly diminished in right base, clear on left  Heart: S1, S2 normal, no murmur, click, rub or gallop, regular rate and rhythm  Abdomen: soft, non-tender; bowel sounds normal; no masses,  no  organomegaly  Extremities: edema 1+ LE shan    Labs reviewed as noted below        ASSESSMENT/PLAN:    Acute hypoxic resp failure - secondary to COPD exac triggered by RSV infection and now with secondary bacterial PNA  -weaned to RA  -has significant mucous plugging and inability to clear secretions- cont CPT, Mucomyst, suctioning  AECOPD - secondary to RSV with secondary bacterial pneumonia. May be due to aspiration given patient's previous poor swallow eval.  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 Sputum culture with Klebsiella. MRSA swab negative  -zosyn (3/29-4/4 )  Abn CXR - there is a R lateral pleural based nodular density on the CXR that does not appear to have been present in the past  -outpatient follow up with Dr. Jones  Dysphagia - diet per speech  - family does not wish to pursue PEG  Proph - eliquis  Dispo - DNAR/select  -stable to dc home  -f/u w/ Dr. Jones in 2 weeks      [facility]  Pulmonary and Critical Care

## 2024-04-08 NOTE — PAYOR COMM NOTE
--------------  DISCHARGE REVIEW    Payor: [facility] POS/MCNP  Subscriber #:  [id number]  Authorization Number: [id number]    Admit date: 3/29/24  Admit time:  11:15 AM  Discharge Date: 4/6/2024  1:00 PM     Admitting Physician: Everardo Woo MD  Attending Physician:  No att. providers found  Primary Care Physician: Sp Myers MD          Discharge Summary Notes        Discharge Summary signed by Michele Chatterjee MD at 4/6/2024 11:31 AM       Author: Michele Chatterjee MD Specialty: HOSPITALIST, Internal Medicine Author Type: Physician    Filed: 4/6/2024 11:31 AM Date of Service: 4/6/2024 11:29 AM Status: Signed    : Michele Chatterjee MD (Physician)                                                              General Medicine Discharge Summary     Patient ID:  Hector Nguyen  89 year old  12/8/1932    Admit date: 3/26/2024    Discharge date and time:4/6/2024    Attending Physician: Michele Chatterjee MD     Primary Care Physician: Sp Myers MD     Discharge Diagnoses: Acute bronchiolitis due to respiratory syncytial virus (RSV) [J21.0]  Acute COPD exacerbation (HCC) [J44.1]  Pleural effusion on right [J90]  Aspiration  Pna  Acute hypoxemic resp failure  Sepsis  Dm2 w steroid induced hyperglycemia  QUINTON on CKD4  Chronic anemia  Dysphagia  pafib        Primary Diagnosis at discharge from Hospital: Pneumonia    Please note that only IHP DMG and EMG patients enrolled in the Medicare ACO, [facility] ACO and [facility] HMOs will be [facility].  For all other patients, please follow usual protocol for discharge care transition.    Secondary Diagnoses: None    Risk of readmission: Hector Nguyen has Moderate Risk of readmission after discharge from the hospital.    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     Acute hypoxic respiratory failure   Acute COPD exacerbation 2/2 RSV Bronchitis  Increase R lung infiltrate, likely secondary bacterial PNA, possible aspiration pna  - PO steroids  -- weaned to RA, family would like comfort 02 for home  - started zosyn 3/29- to complete 7 d course  - scheduled nebuilzers, add hypertonic saline   - breo, BD protocol  - supportive care  - pulm following      Sepsis from above- resolved  - treat PNA, sp sozsyn   - plan as above      DM2 w steroid induced hyperglycemia  - BG reviewed:  with little po and reducing steroid dosing will decrease degludec to 12 u   - may need insulin on dc while on steroids- dw family.  Education provided.    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  - stable     QUINTON on CKD stage 4- resolved  - BLE edema  - IVF, renal consult appreciated   - restart arb, po lasix     Anemia, chronic  - likely from CKD, slight downtrend noted  - venofer      PAF  - cont metoprool and eliquis      Dysphagia  -passed video: puree w nectar thcik liquids  - ensure     Essential HTN  - metoprolol, losartan now resumed     Hyperlipidemia  - statin     Hx CVA  - asa, statin     Hypothyroidism   - sytnhroid      Constipation  - trial lactulose, bowel regimen           Consults: IP CONSULT TO PULMONOLOGY  IP CONSULT TO PULMONOLOGY  IP CONSULT TO SOCIAL WORK  IP CONSULT TO NEPHROLOGY  IP CONSULT TO SOCIAL WORK  IP CONSULT TO FOOD AND NUTRITION SERVICES  IP CONSULT TO SOCIAL WORK  IP CONSULT TO SOCIAL WORK  IP CONSULT TO SPIRITUAL CARE  IP CONSULT TO SOCIAL WORK    Operative  Procedures:        Patient instructions:      Current Discharge Medication List        START taking these medications    Details   benzonatate 200 MG Oral Cap Take 1 capsule (200 mg total) by mouth 3 (three) times daily as needed for cough.      guaiFENesin 400 MG Oral Tab Take 1 tablet (400 mg total) by mouth every 4 (four) hours.      insulin detemir (LEVEMIR FLEXTOUCH) 100 UNIT/ML Subcutaneous Solution Pen-injector Inject 12 Units into the skin nightly for 2 days.      Blood Glucose Monitoring Suppl (ONETOUCH VERIO FLEX SYSTEM) w/Device Does not apply Kit Check BG twice daily      Glucose Blood (ONETOUCH VERIO) In Vitro Strip Check BG twice daily      OneTouch Delica Lancets 33G Does not apply Misc Use as directed      Insulin Pen Needle (BD PEN NEEDLE COLLIN U/F) 32G X 4 MM Does not apply Misc Use a new pen needle with each injection as directed by your doctor           CONTINUE these medications which have NOT CHANGED    Details   acetaminophen 325 MG Oral Tab Take 1-2 tablets (325-650 mg total) by mouth every 6 (six) hours as needed for Pain.      furosemide 40 MG Oral Tab Take 1 tablet (40 mg total) by mouth 2 (two) times daily.      apixaban 2.5 MG Oral Tab Take 1 tablet (2.5 mg total) by mouth 2 (two) times daily.      tamsulosin 0.4 MG Oral Cap Take 1 capsule (0.4 mg total) by mouth daily.      BREO ELLIPTA 200-25 MCG/INH Inhalation Aerosol Powder, Breath Activated Inhale 1 puff into the lungs daily. Use one puff daily.      atorvastatin 40 MG Oral Tab Take 1 tablet (40 mg total) by mouth nightly.      metoprolol tartrate 25 MG Oral Tab Take 0.5 tablets (12.5 mg total) by mouth 2x Daily(Beta Blocker).      levothyroxine 50 MCG Oral Tab Take 1 tablet (50 mcg total) by mouth daily.      Potassium Chloride ER 20 MEQ Oral Tab CR TAKE 1 TABLET BY MOUTH DAILY WHEN USING LASIX      Omeprazole 40 MG Oral Capsule Delayed Release Take 1 capsule (40 mg total) by mouth daily. Before meal      losartan 100 MG Oral Tab  Take 1 tablet (100 mg total) by mouth daily.             I PERSONALLY RECONCILED CURRENT AND DISCHARGE MEDICATIONS ON DAY OF DISCHARGE      Activity: activity as tolerated  Diet: cardiac diet and diabetic diet  Wound Care: as directed  Code Status: DNAR/Selective Treatment      Exam on day of discharge:     Vitals:    04/06/24 0758   BP: 135/40   Pulse: 72   Resp: 17   Temp: 98 °F (36.7 °C)       General: no acute distress, alert and oriented x 3  Heart: RRR  Lungs: clear bilaterally, no active wheezing  Abdomen: nontender, nondistended, intact BS  Extremities: no pedal edema   Neuro: CN inact, no focal deficits      Total time coordinating care for discharge: Greater than 30 minutes    .Estiven Chatterjee  [facility] Hospitalist  [phone]      Electronically signed by Michele Chatterjee MD on 4/6/2024 11:31 AM         REVIEWER COMMENTS

## (undated) NOTE — IP AVS SNAPSHOT
[address]            (For Outpatient Use Only) Initial Admit Date: 2022   Inpt/Obs Admit Date: Inpt: 22 / Obs: N/A   Discharge Date:    Betty Lanes:  [de-identified]   MRN: [de-identified]   CSN: [id number]   CEID: [id number]        ENCOUNTER  Patient Class: Inpatient Admitting Provider: Ajit Jack MD Unit: [place] [address] Service: Cardiac Telemetry Attending Provider: Noemi Reece MD   Bed: 7600-A   Visit Type:   Referring Physician: No ref. provider found Billing Flag:    Admit Diagnosis: Left-sided weakness [R53.1]      PATIENT  Legal Name:   Jason Mcarthur   Legal Sex: Male  Gender ID:              Pref Name:    PCP:  Deann Rueda MD Home: [phone]   Address:  [address] [place] [address] : 1932 (89 yrs) Mobile: [phone]; [phone]         City/State/Zip: [address] Marital:  Language: [address] [place] [address] Drive: [employer] SSN4: [id number] Mosque: [address] Not Listed     Race:  Ethnicity: Non  Or [address] [place] [address] Street   Name Relationship Legal Guardian? Home Phone Work Phone Mobile Phone   1. Jaison Nguyen  2.  Ash Young  Daughter   No [phone]     GUARANTOR  Guarantor: Jason Mcarthur : 1932 Home Phone: [phone]   Address: [address] [place] [address]  Sex: Male Work Phone:    City/State/Zip: [address]   Rel. to Patient: Self Guarantor ID: [id number]   GUARANTOR EMPLOYER   Employer:  Status: RETIRED     COVERAGE  PRIMARY INSURANCE   Payor: BC[place]   Group Number: [id number] Insurance Type: [address] Name: Agustina Saunders : 1932   Subscriber ID: [id number] Pt Rel to Subscriber: Self   SECONDARY INSURANCE   Payor:  Plan:    Group Number:  Insurance Type:    Subscriber Name:  Subscriber :    Subscriber ID:  Pt Rel to Subscriber:    TERTIARY INSURANCE   Payor:  Plan:    Group Number:  Insurance Type:    Subscriber Name:  Subscriber :    Subscriber ID:  Pt Rel to Subscriber:    Hospital Account Financial Class: [facility]    2022

## (undated) NOTE — ED AVS SNAPSHOT
Basil Cantrell   MRN: [id number]    Department:  [facility] Emergency Department   Date of Visit:  10/8/2017           Disclosure     Insurance plans vary and the physician(s) referred by the ER may not be covered by your plan.  Please contac If you have been prescribed any medication(s), please fill your prescription right away and begin taking the medication(s) as directed    If the emergency physician has read X-rays, these will be re-interpreted by a radiologist.  If there is a significant